# Patient Record
Sex: FEMALE | Race: WHITE | NOT HISPANIC OR LATINO | Employment: FULL TIME | URBAN - METROPOLITAN AREA
[De-identification: names, ages, dates, MRNs, and addresses within clinical notes are randomized per-mention and may not be internally consistent; named-entity substitution may affect disease eponyms.]

---

## 2018-02-13 ENCOUNTER — OFFICE VISIT - HEALTHEAST (OUTPATIENT)
Dept: INTERNAL MEDICINE | Facility: CLINIC | Age: 28
End: 2018-02-13

## 2018-02-13 DIAGNOSIS — Z00.00 ANNUAL PHYSICAL EXAM: ICD-10-CM

## 2018-02-13 DIAGNOSIS — Z12.4 SCREENING FOR CERVICAL CANCER: ICD-10-CM

## 2018-02-13 LAB
ALBUMIN SERPL-MCNC: 3.8 G/DL (ref 3.5–5)
ALP SERPL-CCNC: 52 U/L (ref 45–120)
ALT SERPL W P-5'-P-CCNC: 12 U/L (ref 0–45)
ANION GAP SERPL CALCULATED.3IONS-SCNC: 9 MMOL/L (ref 5–18)
AST SERPL W P-5'-P-CCNC: 17 U/L (ref 0–40)
BILIRUB SERPL-MCNC: 0.4 MG/DL (ref 0–1)
BUN SERPL-MCNC: 7 MG/DL (ref 8–22)
CALCIUM SERPL-MCNC: 9.5 MG/DL (ref 8.5–10.5)
CHLORIDE BLD-SCNC: 104 MMOL/L (ref 98–107)
CHOLEST SERPL-MCNC: 199 MG/DL
CO2 SERPL-SCNC: 25 MMOL/L (ref 22–31)
CREAT SERPL-MCNC: 0.8 MG/DL (ref 0.6–1.1)
FASTING STATUS PATIENT QL REPORTED: YES
GFR SERPL CREATININE-BSD FRML MDRD: >60 ML/MIN/1.73M2
GLUCOSE BLD-MCNC: 85 MG/DL (ref 70–125)
HDLC SERPL-MCNC: 45 MG/DL
LDLC SERPL CALC-MCNC: 100 MG/DL
POTASSIUM BLD-SCNC: 3.8 MMOL/L (ref 3.5–5)
PROT SERPL-MCNC: 7 G/DL (ref 6–8)
SODIUM SERPL-SCNC: 138 MMOL/L (ref 136–145)
TRIGL SERPL-MCNC: 269 MG/DL

## 2018-02-13 ASSESSMENT — MIFFLIN-ST. JEOR: SCORE: 1424.42

## 2018-02-15 LAB
BKR LAB AP ABNORMAL BLEEDING: NO
BKR LAB AP BIRTH CONTROL/HORMONES: NORMAL
BKR LAB AP CERVICAL APPEARANCE: NORMAL
BKR LAB AP GYN ADEQUACY: NORMAL
BKR LAB AP GYN INTERPRETATION: NORMAL
BKR LAB AP GYN OTHER FINDINGS: NORMAL
BKR LAB AP HPV REFLEX: NORMAL
BKR LAB AP LMP: NORMAL
BKR LAB AP PATIENT STATUS: NORMAL
BKR LAB AP PREVIOUS ABNORMAL: NORMAL
BKR LAB AP PREVIOUS NORMAL: NORMAL
HIGH RISK?: NO
PATH REPORT.COMMENTS IMP SPEC: NORMAL
RESULT FLAG (HE HISTORICAL CONVERSION): NORMAL

## 2019-04-15 ENCOUNTER — PRENATAL OFFICE VISIT (OUTPATIENT)
Dept: NURSING | Facility: CLINIC | Age: 29
End: 2019-04-15
Payer: COMMERCIAL

## 2019-04-15 VITALS
BODY MASS INDEX: 28.51 KG/M2 | WEIGHT: 167 LBS | HEIGHT: 64 IN | DIASTOLIC BLOOD PRESSURE: 70 MMHG | HEART RATE: 74 BPM | SYSTOLIC BLOOD PRESSURE: 110 MMHG

## 2019-04-15 DIAGNOSIS — Z34.01 ENCOUNTER FOR SUPERVISION OF NORMAL FIRST PREGNANCY IN FIRST TRIMESTER: Primary | ICD-10-CM

## 2019-04-15 LAB
ABO + RH BLD: NORMAL
ABO + RH BLD: NORMAL
BLD GP AB SCN SERPL QL: NORMAL
BLOOD BANK CMNT PATIENT-IMP: NORMAL
ERYTHROCYTE [DISTWIDTH] IN BLOOD BY AUTOMATED COUNT: 11.4 % (ref 10–15)
HCG, QUAL URINE: POSITIVE
HCT VFR BLD AUTO: 38.2 % (ref 35–47)
HGB BLD-MCNC: 13.4 G/DL (ref 11.7–15.7)
MCH RBC QN AUTO: 30.9 PG (ref 26.5–33)
MCHC RBC AUTO-ENTMCNC: 35.1 G/DL (ref 31.5–36.5)
MCV RBC AUTO: 88 FL (ref 78–100)
PLATELET # BLD AUTO: 253 10E9/L (ref 150–450)
RBC # BLD AUTO: 4.34 10E12/L (ref 3.8–5.2)
SPECIMEN EXP DATE BLD: NORMAL
WBC # BLD AUTO: 6.1 10E9/L (ref 4–11)

## 2019-04-15 PROCEDURE — 86901 BLOOD TYPING SEROLOGIC RH(D): CPT | Performed by: ADVANCED PRACTICE MIDWIFE

## 2019-04-15 PROCEDURE — 84703 CHORIONIC GONADOTROPIN ASSAY: CPT

## 2019-04-15 PROCEDURE — 87086 URINE CULTURE/COLONY COUNT: CPT | Performed by: ADVANCED PRACTICE MIDWIFE

## 2019-04-15 PROCEDURE — 86900 BLOOD TYPING SEROLOGIC ABO: CPT | Performed by: ADVANCED PRACTICE MIDWIFE

## 2019-04-15 PROCEDURE — 85027 COMPLETE CBC AUTOMATED: CPT | Performed by: ADVANCED PRACTICE MIDWIFE

## 2019-04-15 PROCEDURE — 36415 COLL VENOUS BLD VENIPUNCTURE: CPT | Performed by: ADVANCED PRACTICE MIDWIFE

## 2019-04-15 PROCEDURE — 86850 RBC ANTIBODY SCREEN: CPT | Performed by: ADVANCED PRACTICE MIDWIFE

## 2019-04-15 PROCEDURE — 86762 RUBELLA ANTIBODY: CPT | Performed by: ADVANCED PRACTICE MIDWIFE

## 2019-04-15 PROCEDURE — 87340 HEPATITIS B SURFACE AG IA: CPT | Performed by: ADVANCED PRACTICE MIDWIFE

## 2019-04-15 PROCEDURE — 99207 ZZC NO CHARGE NURSE ONLY: CPT

## 2019-04-15 PROCEDURE — 0064U ANTB TP TOTAL&RPR IA QUAL: CPT | Performed by: ADVANCED PRACTICE MIDWIFE

## 2019-04-15 PROCEDURE — 87491 CHLMYD TRACH DNA AMP PROBE: CPT | Performed by: ADVANCED PRACTICE MIDWIFE

## 2019-04-15 PROCEDURE — 87389 HIV-1 AG W/HIV-1&-2 AB AG IA: CPT | Performed by: ADVANCED PRACTICE MIDWIFE

## 2019-04-15 PROCEDURE — 87591 N.GONORRHOEAE DNA AMP PROB: CPT | Performed by: ADVANCED PRACTICE MIDWIFE

## 2019-04-15 RX ORDER — PRENATAL VIT/IRON FUM/FOLIC AC 27MG-0.8MG
1 TABLET ORAL DAILY
COMMUNITY
End: 2021-10-20

## 2019-04-15 SDOH — HEALTH STABILITY: MENTAL HEALTH: HOW OFTEN DO YOU HAVE A DRINK CONTAINING ALCOHOL?: NEVER

## 2019-04-15 ASSESSMENT — MIFFLIN-ST. JEOR: SCORE: 1469.57

## 2019-04-15 NOTE — PROGRESS NOTES
"Blood pressure 110/70, pulse 74, height 1.613 m (5' 3.5\"), weight 75.8 kg (167 lb), last menstrual period 02/18/2019, not currently breastfeeding.    8w0d  Estimated Date of Delivery: Nov 25, 2019    Patient supplied answers from flow sheet for:  Prenatal OB Questionnaire.  Past Medical History  Diabetes?: No  Hypertension : No  Heart disease, mitral valve prolapse or rheumatic fever?: No  An autoimmune disease such as lupus or rheumatoid arthritis?: No  Kidney disease or urinary tract infection?: (!) Yes(UTI)  Epilepsy, seizures or spells?: No  Migraine headaches?: No  A stroke or loss of function or sensation?: No  Any other neurological problems?: No  Have you ever been treated for depression?: No  Are you having problems with crying spells or loss of self-esteem?: No  Have you ever required psychiatric care?: No  Have you ever had hepatitis, liver disease or jaundice?: No  Have you been treated for blood clots in your veins, deep vein thromosis, inflammation in the veins, thrombosis, phlebitis, pulmonary embolism or varicosities?: No  Have you had excessive bleeding after surgery or dental work?: No  Do you bleed more than other women after a cut or scratch?: No  Do you have a history of anemia?: No  Have you ever had thyroid problems or taken thyroid medication?: No   Do you have any endocrine problems?: No  Have you ever been in a major accident or suffered serious trauma?: No  Within the last year, has anyone hit, slapped, kicked or otherwise hurt you?: No  In the last year, has anyone forced you to have sex when you didn't want to?: No    Past Medical History 2   Have you ever received a blood transfusion?: No  Would you refuse a blood transfusion if a doctor judged it to be medically necessary?: No  Does anyone in your home smoke?: No  Do you use tobacco products?: No  Do you drink beer, wine or hard liquor?: No  Do you use any of the following: marijuana, speed, cocaine, heroin, hallucinogens or other " drugs?: No   Is your blood type Rh negative?: Unknown  Have you ever had abnormal antibodies in your blood?: No  Have you ever had asthma?: No  Have you ever had tuberculosis?: No  Do you have any allergies to drugs or over-the-counter medications?: No  Allergies: Dust Mites, Aspartame, Ethanol, Venlafaxine, Hydrochloride, Sertraline: No  Have you had any breast problems?: No  Have you ever ?: No  Have you had any gynecological surgical procedures such as cervical conization, a LEEP procedure, laser treatment, cryosurgery of the cervix or a dilation and curettage, etc?: No  Have you ever had any other surgical procedures?: No  Have you been hospitalized for a nonsurgical reason excluding normal delivery?: No  Have you ever had any anesthetic complications?: No  Have you ever had an abnormal pap smear?: No    Past Medical History (Continued)  Do you have a history of abnormalities of the uterus?: No  Did your mother take MARIELOS or any other hormones when she was pregnant with you?: No  Did it take you more than a year to become pregnant?: No  Have you ever been evaluated or treated for infertility?: No  Is there a history of medical problems in your family, which you feel may be important to this pregnancy?: No  Do you have any other problems we have not asked about which you feel may be important to this pregnancy?: No    Symptoms since last menstrual period  Do you have any of the following symptoms: abdominal pain, blood in stools or urine, chest pain, shortness of breath, coughing or vomiting up blood, your heart racing or skipping beats, nausea and vomiting, pain on urination or vaginal discharge or bleed: (!) Yes(abdominal cramping)  Current medications, including over-the-counter medications, you are using? (If not applicable answer none): prenatals  Will the patient be 35 years old or older at the time of delivery?: No    Has the patient, baby's father or anyone in either family had:  Thalassemia  (Slovenian, English, Mediterranean or  background only) and an MCV result less than 80?: No  Neural tube defect such as meningomyelocele, spina bifida or anencephaly?: No  Congenital heart defect?: No  Down's Syndrome?: No  Demar-Sachs disease (Pentecostalism, Cajun, Wolof-Kamas)?: No  Sickle cell disease or trait ()?: No  Hemophilia or other inherited problems of blood?: No  Muscular dystrophy?: No  Cystic fibrosis?: No  Kent's chorea?: No  Mental retardation/autism?: No  Any other inherited genetic or chromosomal disorder?: No  Maternal metabolic disorder (e.g Insulin-dependent diabetes, PKU)?: No  A child with birth defects not listed above?: No  Recurrent pregnancy loss or stillbirth?: No   Has the patient had any medications/street drugs/alcohol since her last menstrual period?: (!) Yes(alcohol before she knew she was pregnant)  Does the patient or baby's father have any other genetic risks?: No    Infection History   Do you object to being tested for Hepatitis B?: No  Do you object to being tested for HIV?: No   Do you feel that you are at high risk for coming in contact with the AIDS virus?: No  Have you ever been treated for tuberculosis?: No  Have you ever had a positive skin test for tuberculosis?: No  Do you live with someone who has tuberculosis?: No  Have you ever been exposed to tuberculosis?: No  Do you have genital herpes?: (!) Yes  Does your partner have genital herpes?: Unknown  Have you had a viral illness since your last period?: No  Have you ever had gonorrhea, chlamydia, syphilis, venereal warts, trichomoniasis, pelvic inflammatory disease or any other sexually transmitted disease?: No  Do you know if you are a genital group B streptococcus carrier?: No  Have you had chicken pox/varicella?: (!) Yes   Have you been vaccinated against chicken Pox?: (!) Yes  Have you had any other infectious diseases?: No      Patient is accompanied by . Prenatal book and folder (containing standard  educational hand-outs and brochures) given to patient. Information in folder reviewed. Questions answered.     Discussed and gave written information on options available for 1st and 2nd trimester screening, CVS, amniocentesis, AFP, and QUAD screen plus optimal time-frame for testing. Brochure given on optional screening available to determine Cystic Fibrosis carrier status. Pt instructed to check with insurance regarding coverage of these optional tests. Pt advised to call back if she desires testing or has any questions or concerns.    Prenatal labs obtained. New prenatal visit scheduled on 4/30 with Ruba Hoyos.        Donna Brooks RN

## 2019-04-16 LAB
BACTERIA SPEC CULT: NORMAL
C TRACH DNA SPEC QL NAA+PROBE: NEGATIVE
HBV SURFACE AG SERPL QL IA: NONREACTIVE
HIV 1+2 AB+HIV1 P24 AG SERPL QL IA: NONREACTIVE
N GONORRHOEA DNA SPEC QL NAA+PROBE: NEGATIVE
RUBV IGG SERPL IA-ACNC: 22 IU/ML
SPECIMEN SOURCE: NORMAL
T PALLIDUM AB SER QL: NONREACTIVE

## 2019-04-29 DIAGNOSIS — Z34.01 ENCOUNTER FOR SUPERVISION OF NORMAL FIRST PREGNANCY IN FIRST TRIMESTER: ICD-10-CM

## 2019-04-30 ENCOUNTER — PRENATAL OFFICE VISIT (OUTPATIENT)
Dept: OBGYN | Facility: CLINIC | Age: 29
End: 2019-04-30
Payer: COMMERCIAL

## 2019-04-30 VITALS — WEIGHT: 171.4 LBS | SYSTOLIC BLOOD PRESSURE: 108 MMHG | BODY MASS INDEX: 29.89 KG/M2 | DIASTOLIC BLOOD PRESSURE: 74 MMHG

## 2019-04-30 DIAGNOSIS — Z34.01 ENCOUNTER FOR SUPERVISION OF NORMAL FIRST PREGNANCY IN FIRST TRIMESTER: Primary | ICD-10-CM

## 2019-04-30 PROCEDURE — 99207 ZZC FIRST OB VISIT: CPT | Performed by: ADVANCED PRACTICE MIDWIFE

## 2019-04-30 NOTE — PROGRESS NOTES
Aliya Yip is a 28 year old  ,  who is not a previous CNM patient. She presents for a new OB Visit. This was not a planned pregnancy, but it is desired.     FOB is Pete who is in good health.  CONSUELO IS actively involved in relationship and this pregnancy.      She has not had bleeding since her LMP.    She reports mild abdominal cramps since her LMP.  She has not had nausea.  has not had vomiting.  Any personal or family history of blood clots? No  History of sickle cell anemia or trait? No         Patient's last menstrual period was 2019 (exact date)..  Estimated Date of Delivery: 2019 Ultrasound consistent with LMP.    MENSTRUAL HISTORY    frequency: every 28-30 days  Last PAP:   History of abnormal Pap?  No    Health maintenance updated:  yes        Current medications are:    Current Outpatient Medications:      Prenatal Vit-Fe Fumarate-FA (PRENATAL MULTIVITAMIN W/IRON) 27-0.8 MG tablet, Take 1 tablet by mouth daily, Disp: , Rfl:      norgestim-eth estrad triphasic (TRI-SPRINTEC) 0.18/0.215/0.25 MG-35 MCG TABS tablet, Take 1 tablet by mouth daily., Disp: , Rfl:      VALACYCLOVIR HCL PO, Take 500 mg by mouth 2 times daily. x3 days, started 13 , Disp: , Rfl:      INFECTION HISTORY  HIV: No  Hepatitis B: No  Hepatitis C: No  Tuberculosis: No   Genital Herpes self: yes  Herpes partner:  no  Chlamydia:  no  Gonorrhea:  no  HPV: No  BV:  No  Syphilis:  No  Chicken Pox:  Yes - had infection in childhood      OB HISTORY  OB History    Para Term  AB Living   1 0 0 0 0 0   SAB TAB Ectopic Multiple Live Births   0 0 0 0 0      # Outcome Date GA Lbr Beto/2nd Weight Sex Delivery Anes PTL Lv   1 Current                PERSONAL HISTORY  Exercise Habits:  none   Employment: Full time.  Her job involves sedentary activity with no potential for toxic exposure.    Travel plans:  are none planned.   Diet: eats regular meals, has adequate calcium intake and takes daily  vitamins  Prenatal vitamins? Yes: DHA  Fish Oil? Yes  Abuse concerns? No  Any history if abuse, varbal, physical, sexual? No  Hgb A1c screen:  BMI > 30: No, 1st degree family DM: Yes, History of GDM: No, PCOS: No, High risk ethnicity: No    Social History     Socioeconomic History     Marital status:      Spouse name: Not on file     Number of children: Not on file     Years of education: Not on file     Highest education level: Not on file   Occupational History     Occupation: lock desk analyst   Social Needs     Financial resource strain: Not on file     Food insecurity:     Worry: Not on file     Inability: Not on file     Transportation needs:     Medical: Not on file     Non-medical: Not on file   Tobacco Use     Smoking status: Never Smoker     Smokeless tobacco: Never Used   Substance and Sexual Activity     Alcohol use: Not Currently     Frequency: Never     Drug use: Never     Sexual activity: Yes     Partners: Male   Lifestyle     Physical activity:     Days per week: Not on file     Minutes per session: Not on file     Stress: Not on file   Relationships     Social connections:     Talks on phone: Not on file     Gets together: Not on file     Attends Gnosticist service: Not on file     Active member of club or organization: Not on file     Attends meetings of clubs or organizations: Not on file     Relationship status: Not on file     Intimate partner violence:     Fear of current or ex partner: Not on file     Emotionally abused: Not on file     Physically abused: Not on file     Forced sexual activity: Not on file   Other Topics Concern     Not on file   Social History Narrative     Not on file       She  reports that she has never smoked. She has never used smokeless tobacco.    STD testing offered?  Declined, done at nurse only appointment    PHQ-2 Score:     PHQ-2 ( 1999 Pf[employer]) 4/30/2019   Q1: Little interest or pleasure in doing things 1   Q2: Feeling down, depressed or hopeless 0   PHQ-2  Score 1     Alcohol Score = denies any ETOH or recreational drug use in pregnancy  Referral/Meds needed? no    PAST MEDICAL/SURGICAL HISTORY  Past Medical History:   Diagnosis Date     Genital herpes      UTI (urinary tract infection)      No past surgical history on file.    FAMILY HISTORY  Family History   Problem Relation Age of Onset     Diabetes Mother      Breast Cancer Mother      Hypertension Mother        ROS:  CONSTITUTIONAL: NEGATIVE for fever, chills, change in weight  INTEGUMENTARU/SKIN: NEGATIVE for worrisome rashes, moles or lesions  EYES: NEGATIVE for vision changes or irritation  ENT: NEGATIVE for ear, mouth and throat problems  RESP: NEGATIVE for significant cough or SOB  BREAST: NEGATIVE for masses, tenderness or discharge  CV: NEGATIVE for chest pain, palpitations or peripheral edema  GI: NEGATIVE for nausea, abdominal pain, heartburn, or change in bowel habits  : NEGATIVE for unusual urinary or vaginal symptoms.   MUSCULOSKELETAL: NEGATIVE for significant arthralgias or myalgia  NEURO: NEGATIVE for weakness, dizziness or paresthesias; POSITIVE for occasional HA, reports 3-4 HA during pregnancy  PSYCHIATRIC: NEGATIVE for changes in mood or affect    PHYSICAL EXAM  Vitals: /74   Wt 77.7 kg (171 lb 6.4 oz)   LMP 02/18/2019 (Exact Date)   BMI 29.89 kg/m    BMI= Body mass index is 29.89 kg/m .     GENERAL:  28 year old pleasant pregnant female, alert, cooperative and well groomed.  NECK:  Thyroid without enlargement and nodules.  Lymph nodes not palpable.   LUNGS:  Clear to auscultation.  BREAST:  Symmetrical without lesions or nodes.  Nipples everted.  Areolas symmetric.  No palpable axillary nodes.  HEART:  RRR without murmur.  ABDOMEN: Soft without masses or tenderness.  No scars noted..  GENITALIA: BUS without tenderness or inflammation.  Perineum without lesions.    VAGINA:  Pink, normal rugae and discharge.  CERVIX:  Posterior, smooth, without discharge, and firm/ closed 4 cm long.    UTERUS: Retroverted, nontender 10 weeks in size.  ADNEXA: Without masses or tenderness  RECTAL:  Normal appearance.  Digital exam deferred.  LOWER EXTREMITIES: No edema. No significant varicosities.    ASSESSMENT/PLAN:    IUP at 10w1d; pregnancy progressing as expected.      ICD-10-CM    1. Encounter for supervision of normal first pregnancy in first trimester Z34.01       consult for US for AMA patients: NA  Genetic Testing reviewed and discussed, patient desires to discuss with spouse and will let provider know. Handout provided      COUNSELING    Instructed on use of triage nurse line and contacting the on call CNM after hours in an emergency.     Symptoms of N&V and fatigue usually start to resolve around 12-16 weeks     Reviewed CNM philosophy, call schedule for labor and delivery, and FR for delivery    1st OB handout given outlining appointment spacing and CNM information    Reviewed exercise and nutrition    Recommend to gain 15 pounds with her pregnancy.    Discussed OTC medications. OB med list given    Encouraged patient to arrange  if needed    Encouraged patient to take PNV's/DHA    Travel precautions discussed, no air travel after 36 weeks and Zika Virus discussed    Will call patient with lab results when available    Does patient desire a RN home visit from the [facility]?  No      F/U to be addressed next visit: none    Advised patient to contact provider if HA become regular or symptoms worsen. Will return to the clinic in 4 weeks for her next routine prenatal check.  Will call to be seen sooner if problems arise.    SHRUTHI Bronson, CNM

## 2019-04-30 NOTE — NURSING NOTE
"Chief Complaint   Patient presents with     Prenatal Care     NPN 10w 1d, no concerns       Initial /74   Wt 77.7 kg (171 lb 6.4 oz)   LMP 2019 (Exact Date)   BMI 29.89 kg/m   Estimated body mass index is 29.89 kg/m  as calculated from the following:    Height as of 4/15/19: 1.613 m (5' 3.5\").    Weight as of this encounter: 77.7 kg (171 lb 6.4 oz).  BP completed using cuff size: regular    Questioned patient about current smoking habits.  Pt. has never smoked.          The following HM Due: NONE  Gaby Story CMA           "

## 2019-04-30 NOTE — PATIENT INSTRUCTIONS
Weeks 9 thru 13 - Gestational Age (Fetal Development - Weeks 7 thru 11)  At 10 weeks, the embryo is at the end of the embryonic period and begins the fetal period. The fetus has grown to about 3 inches in length and weighs about an ounce. The genitalia have clearly formed into male or female, but still can not be seen clearly on an ultrasound. The eyelids close and will not reopen until the around 28 weeks of gestation. The fetus can make a fist, and the buds for baby teeth appear. The head is nearly half the size of the entire fetus.    Thank you for coming to see the Midwives at the   [facility]      We will notify you about your labs that were drawn today once we get the results back or if you have [employer] they will be posted there as well      We will call you personally with results that require further discussion      If any referrals were ordered today you should be getting a call in the next week or you may need to call the number listed with your referral to schedule.            If you need any refills of medications please call your pharmacy and they will contact us      If you have any questions or concerns before your next visit, you can reach the nurse midwife on call by calling our pager number [phone].      If you  wish to schedule another appointment, please call our office at [phone]. You can also make appointments through [employer]        If you have a medical emergency please call [phone].    Because you are pregnant, we have additional resources for you:      You may call our consulting RN's during normal business hours for non-urgent questions about your pregnancy.      After hours you may also page the midwife on call for urgent questions or issues at [phone].  There is always a midwife on call 24 hours a day.    Prenatal Reminders:    Before 14 weeks: Dating ultrasound, Genetic testing       This ultrasound helps us determine your dates accurately. Verifi can be drawn anytime  after 10 weeks of gestation  16 weeks: Optional genetic testing (quad screen) or single AFP       This testing helps understand your baby's risk for some genetic abnormalities.  20 weeks:  Screening ultrasound (fetal survey)       This testing will look for early growth abnormalities, and may tell the baby's sex if you wish to find out.  28 weeks: One hour sugar test (GCT), hemoglobin and platelets       This test helps identify diabetes of pregnancy or gestational diabetes.  We also look      at the iron in your blood and how well your blood clots.  28 - 36 weeks: Tetanus shot (Tdap)       This shot helps protect you and your baby from tetanus and whooping cough.  36 weeks and later: Group B Strep test (GBS)       This test helps predict if you need antibiotics in labor to prevent infection in your baby.  Anytime September to April:  Flu shot       This shot helps protect you and your family from the flu.  This is especially important during pregnancy        Any time during or after your pregnancy you may experience increased depression and/or mood changes.    We are here to support you. Please contact us if you are:    Feeling anxious    Overwhelmed or sad     Trouble sleeping    Crying uncontrollably    Trouble caring for yourself or baby.    The typical schedule after your first visit today you can expect:     Visit 2 - 12-16 weeks  Visit 3 - 20 weeks  Visit 4 - 24 weeks  Visit 5 - 28 weeks  Visit 6 - 32 weeks  Visit 7 - 34 weeks  Visit 8 - 36 weeks  Weekly after 36 weeks until delivery.    If anything comes up between your visits or you have concerns please don't hesitate to contact us.    Secure access to your medical record:  Use [employer] (secure email communication and access to your chart) to send your primary care provider a message or make an appointment. Ask someone on your Team how to sign up for [employer]. To log on to [employer] or for more information in [employer] please visit the website at  [email or web address].       Certified Nurse Midwife (CNM) Team  SHRUTHI Bronson, SHRUTHI Gutierrez, SHRUTHI Brown, SHRUTHI Mendez, SHRUTHI Quiroga, EUFEMIA    Again, thank you for choosing the midwives at [facility].  We are excited to be a part of your pregnancy. Please let us know how we can best partner with you to improve your and your family's health.    Genetic Screening in Pregnancy    There are several options you have for genetic screening in your pregnancy.  Everyone has their own personal reasons to screen or not to screen.  We want you to make the best choice for you and your pregnancy.  Below is a list of options, what they screen for and when the screening is done.  Genetic screening is recommended for women who are 35 and older at delivery and for those with a family history of chromosomal abnormalities. However, screening is offered to all women.    Innatal:      This is a screening for the more common chromosome abnormalities, including trisomy 21 (Down's syndrome), trisomy 18, trisomy 13 and sex chromosome abnormalities. This is a prenatal test that can be done as early as 10 weeks gestation.       A maternal blood sample is drawn that sequences cell-free DNA in maternal plasma. This in turn allows for molecular counting of chromosome copy numbers with a >99% detection rate of Down syndrome, a 97% detection rate of Trisomy 18, and a 78% detection rate of Trisomy 13.    This test will give information about the gender of the baby.  You can choose to not have that disclosed.       Results come back within 10-14 days.     About 5% of samples will have results that are designated as \"indeterminate\" or \"uninterruptable.\" With this type of result, genetic counseling and diagnostic testing are recommended. Remember this is a screening test and does not definitively diagnose or exclude the presence of these chromosome conditions.     This screening may or may " not be covered by insurance.  We recommend you consult your insurance company to discuss.  Financial assistance is available at a low cost if not covered by your insurance.         Hemoglobin Electrophoresis:  Hemoglobin electrophoresis is a non-invasive blood test that looks at abnormal hemoglobin (component of red blood cells) production. Examples of this include sickle cell anemia (which is a disorder of the red blood cells and their shape) and the thalassemia s (which is also a form of anemia).  High risk groups include:  , Southeast Asians, , Mediterranean, Polish, South and Central American and [place] descent. This blood test is usually done with your first OB labs.  This is a one-time test.      Trio Carrier Screen:   1.  Cystic Fibrosis (CF) is the most common life-shortening autosomal  recessive disease among  populations, with a frequency of 1 in  Every 3,500 live births. Although it mainly affects the   Population anyone can request screening. If you have a family history of  cystic fibrosis you should request this test.  This screening is a blood draw      2.  Spinal Muscular Atrophy (SMA) is the most common inherited cause  of infant death.  The most common form of this disorder causes death by a age two.  One in every 6,000 to 10,000 babies born in the  has SMA      3.  Fragile X Syndrome (FXS) is the most common inherited cause of  intellectual disability.  Approximately 1 in every 3,600 boys and 1 in every  6,000 girls is born with FXS      **If you are a carrier of CF or SMA, your partner will also need to be tested. This partner testing is offered free of charge.  This screen can be done prior to pregnancy or at any time during the pregnancy.      Quad Screen:    The quad screen is a quadruple marker screening test done during the second trimester of your pregnancy. This prenatal screening test that measures levels of four substances in a pregnant  woman's blood; Alpha-fetoprotein (AFP), Human chorionic gonadotropin (HCG), Estriol, and Inhibin A.     AFP screens for open neural tube defects like Spina Bifida and Anencephaly.     Spina bifida is a serious birth defect that occurs when a portion of the neural tube fails to develop or close properly, causing defects in the spinal cord and in the bones of the spine.     Anencephaly is a serious birth defect in the closure of the neural tube, resulting in an underdeveloped brain and an incomplete skull.     Human chorionic gonadotropin (HCG), Estriol, and Inhibin screen for Down syndrome (trisomy 21) and Trisomy 18.     Down syndrome is a chromosomal disorder that causes lifelong intellectual disability and developmental delays and, in some people, health problems    Trisomy 18 is a chromosomal disorder that causes severe developmental delays and anatomic abnormalities. It is often fatal by age 1.    The screening is ideally done between 15 and 18 weeks of pregnancy but can be done up to week 20. Even if you have done the Verifi testing you can still get a single AFP drawn to check for neural tube defects.       Screening Ultrasound:    This is a fetal survey done around 20 weeks gestation.  This screen will look at the cardiac activity, fetal heart rate and rhythm, assessment of the amniotic fluid volume, placenta appearance and location, and the umbilical cord vessel number and placental insertion site.  They also do many fetal measurements to make sure the baby is growing properly.  The cervical length is also measured and fetal movement is evaluated.  The sex of the baby can usually be determined.      In the event of a positive screen:    There are two diagnostic tests available if any screening tests come back with an increased risk.  You would first be referred to Maternal Fetal Medicine to be seen by a genetic counselor.  They would assess your risk and see if further diagnostic testing is warranted.  The  options are; chorionic villus sampling (CVS), usually done at 11 to 12 weeks of pregnancy and amniocentesis which is generally done later in pregnancy around 15 to 20 weeks.  All risks/benefits would be explained and you can decide what course of action is best for you and your family.    Why you might choose to screen?    A desire to know as much as you can about your baby    If your baby had a genetic abnormality you can learn about it before they are born    Choose whether to continue the pregnancy or to terminate    Why you might choose to NOT screen?    You feel that whatever happens is fine and you would not terminate    You know you don't want to do any diagnostic tests even if the screening test showed a high possibility of a genetic abnormality        For further information please call:  [facility]  [phone]      Fish Safety During Pregnancy    Often time's women worry about eating fish during pregnancy. Fish can be totally safe to eat during pregnancy.However, some fish may contain contaminants that could harm you or your baby if you eat certain types of fish or eat fish too often. Below is a list of which types of fish to eat, how often to eat fish, and which types of fish to avoid while pregnant.    Reasons to eat fish:    Fish is a great source of protein, vitamins, and minerals.    The oils found in fish are important to unborn and breast fed babies    Eating fish may play a role in the prevention of heart disease in adults       Fish to EAT while pregnant   2 servings per week of any of these types of fish    Catfish (farm raised)  Cod    Crab    Gonzales    Flatfish   Oysters    [place]   [place]     Sardines   Scallops    Shrimp   Tilapia   1 serving per week of any of these fish    Canned LIGHT tuna     BENJA King   [place]    Yellow Perch   1 serving per MONTH of these types of fish    Canned WHITE tuna  [facility]  Rodriguez    Grouper   Miguel A Vital    [place] Roughy    Tuna steak   [place] caught-    Bass    Catfish    Walleye smaller than 20 inches    Northern Driver smaller than 30 inches         Servings of fish should be based on your weight, 1 oz for every 20 lbs of body weight. For example: 130 lb person can safely eat 7 oz     150 lb person can safely eat 8 oz     170 lb person can safely eat 9 oz      Make sure to space out meals with fish throughout the month, don't eat all your fish meals for the month within a few days.       Fish to Avoid while Pregnant:      Shark   Johan Mackerel    Swordfish  Raw Sushi-any type of fish    Tile fish         [place] Caught:      Walleye longer than 20 in    Northern Driver longer than 30 in    Musky      Contaminants:      Mercury comes from air pollution and small amounts of mercury can damage the brain that is just starting to form or grow. Too much mercury may affect a child's behavior and lead to learning problems later in life. Too much mercury in adults and older children may cause tingling, numbness in hands and feet, or vision changes    PCBs a man-made substance once used in electrical transformers but was banned in 1967 although it can still be found in the [place]. A baby who are exposed to PCBs during pregnancy may have a lower birth weight, reduced head size, and delayed physical development. Exposure to PCBs may also cause cancer    PFOS is a man-made chemical to make products that resist heat, oil, stains, and grease. Studies in lab animals exposed to low levels of PFOS showed decreas HDL (good cholesterol) and changes in thyroid hormone levels. The concern about PFOS is with long-term exposure such as consuming large amounts over a long period of time     Mercury and PFOS cannot be removed through cooking or cleaning. By removing fat when cleaning and cooking you can reduce PCBs.      For more information and up to date information about fish safety  in [place] please contact the [place] Department of Health (MD) or the Department of Natural Resources (DNR):    [email or web address].  DNR: [phone]  MD: [phone]

## 2019-05-03 ENCOUNTER — HEALTH MAINTENANCE LETTER (OUTPATIENT)
Age: 29
End: 2019-05-03

## 2019-05-28 ENCOUNTER — PRENATAL OFFICE VISIT (OUTPATIENT)
Dept: OBGYN | Facility: CLINIC | Age: 29
End: 2019-05-28
Payer: COMMERCIAL

## 2019-05-28 VITALS — WEIGHT: 165 LBS | DIASTOLIC BLOOD PRESSURE: 72 MMHG | SYSTOLIC BLOOD PRESSURE: 112 MMHG | BODY MASS INDEX: 28.77 KG/M2

## 2019-05-28 DIAGNOSIS — Z34.02 ENCOUNTER FOR SUPERVISION OF NORMAL FIRST PREGNANCY IN SECOND TRIMESTER: Primary | ICD-10-CM

## 2019-05-28 PROBLEM — Z34.90 SUPERVISION OF NORMAL PREGNANCY: Status: ACTIVE | Noted: 2019-05-28

## 2019-05-28 PROCEDURE — 99207 ZZC PRENATAL VISIT: CPT | Performed by: ADVANCED PRACTICE MIDWIFE

## 2019-05-28 NOTE — PROGRESS NOTES
S: Patient feels well and has no concerns.  Patient has not had nausea and has not had vomiting  O: /72 (BP Location: Left arm, Cuff Size: Adult Regular)   Wt 74.8 kg (165 lb)   LMP 02/18/2019 (Exact Date)   BMI 28.77 kg/m         Exam:  Constitutional: healthy, alert and no distress  Respiratory: Respirations even and unlabored  Gastrointestinal: Abdomen soft, non-tender. Fundus measures appropriately for gestational age. Fetal heart tones heard easily  Psychiatric: mentation appears normal and affect normal/bright  A:    Diagnosis Comments   1. Encounter for supervision of normal first pregnancy in second trimester       P:  Educated about diet and exercise and normal weight gain  Normal to feel movement between 18-22 weeks  Reviewed labs from 1st OB  Discussed genetic screening; patient and partner decline genetic screening  Warning signs discussed  Discussed option for Quad screen at next visit.   Return to clinic 4 weeks  Encouraged patient to call with any questions or concerns.    SHRUTHI Bronson, CNM

## 2019-05-28 NOTE — NURSING NOTE
"Chief Complaint   Patient presents with     Prenatal Care     14w 1d, no concerns       Initial /72 (BP Location: Left arm, Cuff Size: Adult Regular)   Wt 74.8 kg (165 lb)   LMP 2019 (Exact Date)   BMI 28.77 kg/m   Estimated body mass index is 28.77 kg/m  as calculated from the following:    Height as of 4/15/19: 1.613 m (5' 3.5\").    Weight as of this encounter: 74.8 kg (165 lb).  BP completed using cuff size: regular    Questioned patient about current smoking habits.  Pt. has never smoked.          The following HM Due: NONE  Gaby Story CMA           "

## 2019-05-28 NOTE — PATIENT INSTRUCTIONS
Weeks 14 thru 16 - Gestational Age (Fetal age - Weeks 12 thru 14):  The fetus s skin is thin and see-through. Fine hair called lanugo begins to form on the head. The fetus begins sucking and swallows bits of amniotic fluid. Fingerprints which individualize each human being have now begun to develop on the tiny fingers of the fetus. Meconium is made in the intestinal tract and will build up to be the baby s first bowel movement. Flutters may be felt in the mom s growing abdomen, as the fetus begins to move around more. Sweat glands have developed, and the liver and pancreas produce fluid secretions. The fetus has reached 6 inches in length and weighs about 4 ounces

## 2019-06-24 ENCOUNTER — PRENATAL OFFICE VISIT (OUTPATIENT)
Dept: OBGYN | Facility: CLINIC | Age: 29
End: 2019-06-24
Payer: COMMERCIAL

## 2019-06-24 VITALS — DIASTOLIC BLOOD PRESSURE: 72 MMHG | SYSTOLIC BLOOD PRESSURE: 118 MMHG | WEIGHT: 171 LBS | BODY MASS INDEX: 29.82 KG/M2

## 2019-06-24 DIAGNOSIS — Z34.02 ENCOUNTER FOR SUPERVISION OF NORMAL FIRST PREGNANCY IN SECOND TRIMESTER: Primary | ICD-10-CM

## 2019-06-24 PROCEDURE — 99207 ZZC PRENATAL VISIT: CPT | Performed by: ADVANCED PRACTICE MIDWIFE

## 2019-06-24 NOTE — NURSING NOTE
"Chief Complaint   Patient presents with     Prenatal Care     18w, no concerns       Initial /72   Wt 77.6 kg (171 lb)   LMP 2019 (Exact Date)   BMI 29.82 kg/m   Estimated body mass index is 29.82 kg/m  as calculated from the following:    Height as of 4/15/19: 1.613 m (5' 3.5\").    Weight as of this encounter: 77.6 kg (171 lb).  BP completed using cuff size: regular    Questioned patient about current smoking habits.  Pt. has never smoked.          The following HM Due: NONE  Gaby Story CMA             "

## 2019-06-24 NOTE — PROGRESS NOTES
S:Feels well and has no concerns.  Fetal movement No  Denies loss of fluid/vb/contractions/pelvic pain  Depression screening done  O:  /72   Wt 77.6 kg (171 lb)   LMP 02/18/2019 (Exact Date)   BMI 29.82 kg/m    Exam:  Constitutional: healthy, alert and no distress  Respiratory: Respirations even and unlabored  Gastrointestinal: Abdomen soft, non-tender. Fundus measures appropriately for gestational age. Fetal heart tones heard easily.  Psychiatric: mentation appears normal and affect normal/bright  A:    Diagnosis Comments   1. Encounter for supervision of normal first pregnancy in second trimester  US OB > 14 Weeks      P: Discussed options for quad screen today  declined quad screen today  Anatomy ultrasound next visit between 20-22 weeks  Return to clinic 4 weeks    SHRUTHI Bronson, EUFEMIA

## 2019-06-24 NOTE — PATIENT INSTRUCTIONS
"Weeks 17 thru 20 - Gestational Age (Fetal Age - Weeks 15 thru 18):  The baby has reached a point where movements are being felt more often by the mother. The eyebrows and eyelashes grow in, and tiny nails have begun to grow on the fingers and toes. The skin of the fetus is going through many changes and begins to produce vernix at the twentieth week. Vernix is a white pasty substance that covers the fetus  skin to protect it from amniotic fluid. Your baby can now hear your voices and music.  A fetal heartbeat can be heard by a stethoscope now. The fetus has reached a length of 8 inches and weighs about 12 ounces.  Genetic Screening in Pregnancy    There are several options you have for genetic screening in your pregnancy.  Everyone has their own personal reasons to screen or not to screen.  We want you to make the best choice for you and your pregnancy.  Below is a list of options, what they screen for and when the screening is done.  Genetic screening is recommended for women who are 35 and older at delivery and for those with a family history of chromosomal abnormalities. However, screening is offered to all women.    Innatal:      This is a screening for the more common chromosome abnormalities, including trisomy 21 (Down's syndrome), trisomy 18, trisomy 13 and sex chromosome abnormalities. This is a prenatal test that can be done as early as 10 weeks gestation.       A maternal blood sample is drawn that sequences cell-free DNA in maternal plasma. This in turn allows for molecular counting of chromosome copy numbers with a >99% detection rate of Down syndrome, a 97% detection rate of Trisomy 18, and a 78% detection rate of Trisomy 13.    This test will give information about the gender of the baby.  You can choose to not have that disclosed.       Results come back within 10-14 days.     About 5% of samples will have results that are designated as \"indeterminate\" or \"uninterruptable.\" With this type of result, " genetic counseling and diagnostic testing are recommended. Remember this is a screening test and does not definitively diagnose or exclude the presence of these chromosome conditions.     This screening may or may not be covered by insurance.  We recommend you consult your insurance company to discuss.  Financial assistance is available at a low cost if not covered by your insurance.         Hemoglobin Electrophoresis:  Hemoglobin electrophoresis is a non-invasive blood test that looks at abnormal hemoglobin (component of red blood cells) production. Examples of this include sickle cell anemia (which is a disorder of the red blood cells and their shape) and the thalassemia s (which is also a form of anemia).  High risk groups include:  , Southeast Asians, , Mediterranean, Tristanian, South and Central American and [place] descent. This blood test is usually done with your first OB labs.  This is a one-time test.      Trio Carrier Screen:   1.  Cystic Fibrosis (CF) is the most common life-shortening autosomal  recessive disease among  populations, with a frequency of 1 in  Every 3,500 live births. Although it mainly affects the   Population anyone can request screening. If you have a family history of  cystic fibrosis you should request this test.  This screening is a blood draw      2.  Spinal Muscular Atrophy (SMA) is the most common inherited cause  of infant death.  The most common form of this disorder causes death by a age two.  One in every 6,000 to 10,000 babies born in the  has SMA      3.  Fragile X Syndrome (FXS) is the most common inherited cause of  intellectual disability.  Approximately 1 in every 3,600 boys and 1 in every  6,000 girls is born with FXS      **If you are a carrier of CF or SMA, your partner will also need to be tested. This partner testing is offered free of charge.  This screen can be done prior to pregnancy or at any time during the  pregnancy.      Quad Screen:    The quad screen is a quadruple marker screening test done during the second trimester of your pregnancy. This prenatal screening test that measures levels of four substances in a pregnant woman's blood; Alpha-fetoprotein (AFP), Human chorionic gonadotropin (HCG), Estriol, and Inhibin A.     AFP screens for open neural tube defects like Spina Bifida and Anencephaly.     Spina bifida is a serious birth defect that occurs when a portion of the neural tube fails to develop or close properly, causing defects in the spinal cord and in the bones of the spine.     Anencephaly is a serious birth defect in the closure of the neural tube, resulting in an underdeveloped brain and an incomplete skull.     Human chorionic gonadotropin (HCG), Estriol, and Inhibin screen for Down syndrome (trisomy 21) and Trisomy 18.     Down syndrome is a chromosomal disorder that causes lifelong intellectual disability and developmental delays and, in some people, health problems    Trisomy 18 is a chromosomal disorder that causes severe developmental delays and anatomic abnormalities. It is often fatal by age 1.    The screening is ideally done between 15 and 18 weeks of pregnancy but can be done up to week 20. Even if you have done the Verifi testing you can still get a single AFP drawn to check for neural tube defects.       Screening Ultrasound:    This is a fetal survey done around 20 weeks gestation.  This screen will look at the cardiac activity, fetal heart rate and rhythm, assessment of the amniotic fluid volume, placenta appearance and location, and the umbilical cord vessel number and placental insertion site.  They also do many fetal measurements to make sure the baby is growing properly.  The cervical length is also measured and fetal movement is evaluated.  The sex of the baby can usually be determined.      In the event of a positive screen:    There are two diagnostic tests available if any  screening tests come back with an increased risk.  You would first be referred to Maternal Fetal Medicine to be seen by a genetic counselor.  They would assess your risk and see if further diagnostic testing is warranted.  The options are; chorionic villus sampling (CVS), usually done at 11 to 12 weeks of pregnancy and amniocentesis which is generally done later in pregnancy around 15 to 20 weeks.  All risks/benefits would be explained and you can decide what course of action is best for you and your family.    Why you might choose to screen?    A desire to know as much as you can about your baby    If your baby had a genetic abnormality you can learn about it before they are born    Choose whether to continue the pregnancy or to terminate    Why you might choose to NOT screen?    You feel that whatever happens is fine and you would not terminate    You know you don't want to do any diagnostic tests even if the screening test showed a high possibility of a genetic abnormality        For further information please call:  Konrad Ochoa  [phone]

## 2019-07-08 ENCOUNTER — TELEPHONE (OUTPATIENT)
Dept: OBGYN | Facility: CLINIC | Age: 29
End: 2019-07-08

## 2019-07-18 ENCOUNTER — ANCILLARY PROCEDURE (OUTPATIENT)
Dept: ULTRASOUND IMAGING | Facility: CLINIC | Age: 29
End: 2019-07-18
Attending: ADVANCED PRACTICE MIDWIFE
Payer: COMMERCIAL

## 2019-07-18 DIAGNOSIS — Z34.02 ENCOUNTER FOR SUPERVISION OF NORMAL FIRST PREGNANCY IN SECOND TRIMESTER: ICD-10-CM

## 2019-07-18 PROCEDURE — 76805 OB US >/= 14 WKS SNGL FETUS: CPT | Performed by: FAMILY MEDICINE

## 2019-07-29 ENCOUNTER — PRENATAL OFFICE VISIT (OUTPATIENT)
Dept: OBGYN | Facility: CLINIC | Age: 29
End: 2019-07-29
Payer: COMMERCIAL

## 2019-07-29 VITALS — BODY MASS INDEX: 31.04 KG/M2 | SYSTOLIC BLOOD PRESSURE: 120 MMHG | DIASTOLIC BLOOD PRESSURE: 78 MMHG | WEIGHT: 178 LBS

## 2019-07-29 DIAGNOSIS — O28.3 ABNORMAL ULTRASONIC FINDING ON ANTENATAL SCREENING OF MOTHER: Primary | ICD-10-CM

## 2019-07-29 DIAGNOSIS — Z34.02 ENCOUNTER FOR SUPERVISION OF NORMAL FIRST PREGNANCY IN SECOND TRIMESTER: ICD-10-CM

## 2019-07-29 PROCEDURE — 99207 ZZC PRENATAL VISIT: CPT | Performed by: ADVANCED PRACTICE MIDWIFE

## 2019-07-29 NOTE — PATIENT INSTRUCTIONS
GESTATIONAL DIABETES SCREENING    All pregnant women are screened at least once for diabetes as part of their prenatal care. A woman has gestational diabetes if she has high blood sugars for the first time during pregnancy.      Diabetes can harm your health and the health of your baby.  But if we find the diabetes early in pregnancy we will watch your health closely and prevent further problems.       We will check for gestational diabetes during 28 week visit. Please note you can not do this prior to 24 weeks of gestation.      Plan to spend about an hour at the clinic.  When you check in let us know that you will be having your diabetes screening that day.       We will give you a 50 gram glucose drink that you have 5 minutes to consume.  Exactly one hour later you will have draw blood from your arm to check your blood sugar level.      We will call you to let you know if your results are normal.  If the results are normal no more testing will be needed.  If your results are not normal we will discuss follow up testing with you.        You may eat prior to the testing but it is not recommended to eat or drink very sweet things such as pop, juice, candy or dessert type foods.  Eat a high protein, low carb meals prior to testing.    If you have any questions please call:    [facility]   [phone]    Weeks 24 thru 26 - Gestational Age (Fetal Age - Weeks 22 thru 24)- Beginning the third trimester:  If your baby was delivered now, it could possibly survive outside of your body, with the assistance of medical technology. The fetus has developed patterns of  sleeping and waking and mom will begin to notice when each of these takes place. The fetus has a startle reflex, and the air sacs in the lungs have begun formation. The brain will be developing rapidly over the next few weeks. The nervous system has developed enough to control some functions. The fetus has reached about 14 inches in length and weighs about 2    pounds.

## 2019-07-30 ENCOUNTER — TRANSCRIBE ORDERS (OUTPATIENT)
Dept: MATERNAL FETAL MEDICINE | Facility: CLINIC | Age: 29
End: 2019-07-30

## 2019-07-30 DIAGNOSIS — O26.90 PREGNANCY RELATED CONDITION, ANTEPARTUM: Primary | ICD-10-CM

## 2019-07-30 NOTE — PROGRESS NOTES
S: Feels well and has no concerns..  Baby active.  Denies uterine cramping, vaginal bleeding or leaking of fluid  O: Vitals: /78 (Cuff Size: Adult Regular)   Wt 80.7 kg (178 lb)   LMP 2019 (Exact Date)   BMI 31.04 kg/m    BMI= Body mass index is 31.04 kg/m .  Exam:  Constitutional: healthy, alert and no distress  Respiratory: respirations even and unlabored  Gastrointestinal: Abdomen soft, non-tender. Fundus measures appropriate for gestational age. Fetal heart tones hear without difficulty and within normal limits  : Deferred  Psychiatric: mentation appears normal and affect normal/bright  A:    Diagnosis Comments   1. Abnormal ultrasonic finding on  screening of mother  [facility] REFERRAL-PREGNANCY    2. Encounter for supervision of normal first pregnancy in second trimester       P: Unable to visualize umbilical cord insertion on 20 week anatomy scan. Recommendation that we send patient to [facility].  Discussed GCT/repeat RPR for next visit, handout provided, reminded of longer appointment.  Tdap next visit; reviewed CDC recommendations and partner/family vaccination recommended as well.  Need for Rhogam? No, to be done next visit   Encouraged patient to call with any questions or concerns.  Return to clinic 4 weeks    SHRUTHI Bronson, EUFEMIA

## 2019-08-06 ENCOUNTER — PRE VISIT (OUTPATIENT)
Dept: MATERNAL FETAL MEDICINE | Facility: CLINIC | Age: 29
End: 2019-08-06

## 2019-08-09 ENCOUNTER — OFFICE VISIT (OUTPATIENT)
Dept: MATERNAL FETAL MEDICINE | Facility: CLINIC | Age: 29
End: 2019-08-09
Attending: ADVANCED PRACTICE MIDWIFE
Payer: COMMERCIAL

## 2019-08-09 ENCOUNTER — HOSPITAL ENCOUNTER (OUTPATIENT)
Dept: ULTRASOUND IMAGING | Facility: CLINIC | Age: 29
Discharge: HOME | End: 2019-08-09
Attending: ADVANCED PRACTICE MIDWIFE | Admitting: ADVANCED PRACTICE MIDWIFE
Payer: COMMERCIAL

## 2019-08-09 DIAGNOSIS — O43.122 VELAMENTOUS INSERTION OF UMBILICAL CORD IN SECOND TRIMESTER: Primary | ICD-10-CM

## 2019-08-09 DIAGNOSIS — O26.90 PREGNANCY RELATED CONDITION, ANTEPARTUM: ICD-10-CM

## 2019-08-09 PROCEDURE — 76811 OB US DETAILED SNGL FETUS: CPT

## 2019-08-09 NOTE — PROGRESS NOTES
Please refer to ultrasound report under 'Imaging' Studies of 'Chart Review' tabs.    Jaswinder Figueredo M.D.

## 2019-08-23 ENCOUNTER — HOSPITAL ENCOUNTER (OUTPATIENT)
Facility: CLINIC | Age: 29
Discharge: HOME | End: 2019-08-23
Attending: ADVANCED PRACTICE MIDWIFE | Admitting: ADVANCED PRACTICE MIDWIFE
Payer: COMMERCIAL

## 2019-08-23 VITALS
RESPIRATION RATE: 18 BRPM | TEMPERATURE: 98.1 F | SYSTOLIC BLOOD PRESSURE: 128 MMHG | WEIGHT: 181 LBS | HEIGHT: 63 IN | DIASTOLIC BLOOD PRESSURE: 82 MMHG | BODY MASS INDEX: 32.07 KG/M2

## 2019-08-23 PROBLEM — Z36.89 ENCOUNTER FOR TRIAGE IN PREGNANT PATIENT: Status: ACTIVE | Noted: 2019-08-23

## 2019-08-23 LAB
ALBUMIN UR-MCNC: NEGATIVE MG/DL
APPEARANCE UR: CLEAR
BACTERIA #/AREA URNS HPF: ABNORMAL /HPF
BILIRUB UR QL STRIP: NEGATIVE
COLOR UR AUTO: ABNORMAL
ERYTHROCYTE [DISTWIDTH] IN BLOOD BY AUTOMATED COUNT: 12.4 % (ref 10–15)
GLUCOSE BLDC GLUCOMTR-MCNC: 103 MG/DL (ref 70–99)
GLUCOSE UR STRIP-MCNC: NEGATIVE MG/DL
HCT VFR BLD AUTO: 35.6 % (ref 35–47)
HGB BLD-MCNC: 11.9 G/DL (ref 11.7–15.7)
HGB UR QL STRIP: NEGATIVE
KETONES UR STRIP-MCNC: NEGATIVE MG/DL
LEUKOCYTE ESTERASE UR QL STRIP: NEGATIVE
MCH RBC QN AUTO: 30.4 PG (ref 26.5–33)
MCHC RBC AUTO-ENTMCNC: 33.4 G/DL (ref 31.5–36.5)
MCV RBC AUTO: 91 FL (ref 78–100)
NITRATE UR QL: NEGATIVE
PH UR STRIP: 7 PH (ref 5–7)
PLATELET # BLD AUTO: 243 10E9/L (ref 150–450)
RBC # BLD AUTO: 3.92 10E12/L (ref 3.8–5.2)
RBC #/AREA URNS AUTO: 1 /HPF (ref 0–2)
SOURCE: ABNORMAL
SP GR UR STRIP: 1.01 (ref 1–1.03)
SQUAMOUS #/AREA URNS AUTO: 1 /HPF (ref 0–1)
UROBILINOGEN UR STRIP-MCNC: NORMAL MG/DL (ref 0–2)
WBC # BLD AUTO: 9.7 10E9/L (ref 4–11)
WBC #/AREA URNS AUTO: 1 /HPF (ref 0–5)

## 2019-08-23 PROCEDURE — 25000132 ZZH RX MED GY IP 250 OP 250 PS 637: Performed by: ADVANCED PRACTICE MIDWIFE

## 2019-08-23 PROCEDURE — 59025 FETAL NON-STRESS TEST: CPT | Mod: 26 | Performed by: ADVANCED PRACTICE MIDWIFE

## 2019-08-23 PROCEDURE — 96360 HYDRATION IV INFUSION INIT: CPT

## 2019-08-23 PROCEDURE — 81001 URINALYSIS AUTO W/SCOPE: CPT | Performed by: ADVANCED PRACTICE MIDWIFE

## 2019-08-23 PROCEDURE — 25800030 ZZH RX IP 258 OP 636: Performed by: ADVANCED PRACTICE MIDWIFE

## 2019-08-23 PROCEDURE — 99213 OFFICE O/P EST LOW 20 MIN: CPT | Mod: 25 | Performed by: ADVANCED PRACTICE MIDWIFE

## 2019-08-23 PROCEDURE — 85027 COMPLETE CBC AUTOMATED: CPT | Performed by: ADVANCED PRACTICE MIDWIFE

## 2019-08-23 PROCEDURE — 82962 GLUCOSE BLOOD TEST: CPT

## 2019-08-23 PROCEDURE — G0463 HOSPITAL OUTPT CLINIC VISIT: HCPCS

## 2019-08-23 RX ORDER — DIPHENHYDRAMINE HCL 25 MG
25 CAPSULE ORAL ONCE
Status: COMPLETED | OUTPATIENT
Start: 2019-08-23 | End: 2019-08-23

## 2019-08-23 RX ADMIN — DIPHENHYDRAMINE HYDROCHLORIDE 25 MG: 25 CAPSULE ORAL at 23:01

## 2019-08-23 RX ADMIN — SODIUM CHLORIDE, POTASSIUM CHLORIDE, SODIUM LACTATE AND CALCIUM CHLORIDE 1000 ML: 600; 310; 30; 20 INJECTION, SOLUTION INTRAVENOUS at 21:50

## 2019-08-23 ASSESSMENT — MIFFLIN-ST. JEOR: SCORE: 1520.14

## 2019-08-24 NOTE — PLAN OF CARE
Data: Patient assessed in the [facility] for back pain, dizziness.  Cervical exam not examined.  Membranes intact.  Contractions none.  Action:  Presumed adequate fetal oxygenation documented (see flow record). Discharge instructions reviewed.  Patient instructed to report change in fetal movement, vaginal leaking of fluid or bleeding, abdominal pain, or any concerns related to the pregnancy to her nurse/physician.    Response: Orders to discharge home per Josephine Alvares.  Patient verbalized understanding of education and verbalized agreement with plan. Discharged to home at 2305.

## 2019-08-24 NOTE — PROVIDER NOTIFICATION
08/23/19 2110   Provider Notification   Provider Name/Title EUFEMIA gonzalez   Method of Notification At Bedside   Request Evaluate in Person   EUFEMIA pickering at bedside to assess pt. Wants a BG done which was 103 and for pt to eat. Will re evaluate how pt feels after done eating.

## 2019-08-24 NOTE — DISCHARGE INSTRUCTIONS
Discharge Instruction for Undelivered Patients      You were seen for: back pain, dizziness  We Consulted: Josephine SKELTON CNM    Diet:   Drink 8 to 12 glasses of liquids (milk, juice, water) every day.  You may eat meals and snacks.     Activity:  Call your doctor or nurse midwife if your baby is moving less than usual.     Call your provider if you notice:  Swelling in your face or increased swelling in your hands or legs.  Headaches that are not relieved by Tylenol (acetaminophen).  Changes in your vision (blurring: seeing spots or stars.)  Nausea (sick to your stomach) and vomiting (throwing up).   Weight gain of 5 pounds or more per week.  Heartburn that doesn't go away.  Signs of bladder infection: pain when you urinate (use the toilet), need to go more often and more urgently.  The bag of beyer (rupture of membranes) breaks, or you notice leaking in your underwear.  Bright red blood in your underwear.  Abdominal (lower belly) or stomach pain.  For first baby: Contractions (tightening) less than 5 minutes apart for one hour or more.  *If less than 34 weeks: Contractions (tightenings) more than 6 times in one hour.  Increase or change in vaginal discharge (note the color and amount)    Follow-up:  As scheduled in the clinic      When to call   Discuss this with your nurse-midwife.  In general, you should call if you have leaking fluid, bleeding, decreased baby movement or if your contractions are about five minutes apart.  The midwife pager phone number is [phone]. Please call this number.  When you call, you will be prompted to enter the call back number you would like the on call provider to call back.  Please enter a 10 digit phone number. You will hear a few beeps after entering the phone number.  The on-call provider will call you back, perhaps from a blocked phone number. If you do not hear from the on call midwife within 20 minutes, please call our main clinic phone [phone].    [facility]      Your baby will be born at the [facility], which is on the fourth floor of [facility].  Call ([phone]) to arrange a tour of the Birth[facility].     Packing your bag    Bring clothing for you and your baby, and anything that may make your birth/stay more comfortable.  It is a good idea to practice installing the car seat before you deliver.  Please do not bring valuables to the hospital.

## 2019-08-24 NOTE — PROVIDER NOTIFICATION
08/23/19 2010   Provider Notification   Provider Name/Title EUFEMIA Burton   Method of Notification At Bedside   Request Evaluate in Person   EUFEMIA Hale at bedside to assess. CNM wants BG done which was 103 and wants pt to eat and re evaluate how pt is feeling.

## 2019-08-24 NOTE — PROVIDER NOTIFICATION
08/23/19 2145   Provider Notification   Provider Name/Title EUFEMIA Burton   Method of Notification At Bedside   Request Evaluate in Person   Notification Reason Status Update   Pt states she feels the same after eating a meal. Josephine gave orders for 1 L LR bolus and a CBC.

## 2019-08-24 NOTE — PLAN OF CARE
Data: Patient presented to [facility]: 2019  8:09 PM.  Reason for maternal/fetal assessment is dizziness and back pain. Patient reports having a dizzy spell while laying down an hour ago but has since resolved, lower dull back pain that comes and goes and c/o feeling hot.  Patient is a .  Prenatal record reviewed. Pregnancy  has been uncomplicated.  Gestational Age 26w4d. VSS. Fetal movement present. Patient denies uterine contractions, leaking of vaginal fluid/rupture of membranes, vaginal bleeding, abdominal pain, pelvic pressure, nausea, vomiting, headache, visual disturbances, epigastric or URQ pain, significant edema. Support person is present.   Action: Verbal consent for EFM. Triage assessment completed. Bill of rights reviewed.  Response: Patient verbalized agreement with plan. Will contact Dr Josephine Alvares with update and further orders.

## 2019-08-24 NOTE — PROGRESS NOTES
CHIEF COMPLAINT  ========================  Aliya Yip is a 29 year old patient presenting today at 26w4d for evaluation of flushing.  She is here today with Pete who she does live with.     Patient's last menstrual period was 2019 (exact date).  Estimated Date of Delivery: Estimated Date of Delivery: 2019     HPI: Patient here with  complaining of face flushing. Reports that she and her  were going to dinner and when she arrived started feeling hot and dizzy. Denies new foods, medications, soaps, detergents, endocrine disorders, heart palpitations, nausea, vaginal or urinary symptoms, headache, hematuria, rash, itching, HSV symptoms, bowel changes, fever or illness. Denies recent intercourse that could cause hormonal fluctuations. Does not work in  or around sick children, unlikely TORCH infection. States she hadn't eaten since lunch.  Also reporting back pain. State this has worsened over the past week.  Good fetal movement.   ==================     CONTRACTIONS: none  ABDOMINAL PAIN: none  FETAL MOVEMENT: active  VAGINAL BLEEDING: none  RUPTURE OF MEMBRANES: no  PELVIC PAIN: none  OTHER: low back pain   REVIEW OF SYSTEMS  =====================  C: NEGATIVE for fever, chills POSITIVE FLUSHING   R: NEGATIVE for significant cough or SOB  CV: NEGATIVE for chest pain, palpitations or varicosities  GI: NEGATIVE for nausea, abdominal pain, heartburn, or change in bowel habits  : NEGATIVE for frequency, dysuria, or hematuria  NEURO: POSITIVE for dizziness/lightheadedness  P: NEGATIVE for changes in mood or affect  HISTORIES  ==============  ALLERGIES:  No Known Allergies  PAST MEDICAL HISTORY  Past Medical History:   Diagnosis Date     Genital herpes      UTI (urinary tract infection)      FAMILY HISTORY  Family History   Problem Relation Age of Onset     Diabetes Mother      Breast Cancer Mother      Hypertension Mother      OB HISTORY  OB History    Para Term  AB  "Living   1 0 0 0 0 0   SAB TAB Ectopic Multiple Live Births   0 0 0 0 0      # Outcome Date GA Lbr Beto/2nd Weight Sex Delivery Anes PTL Lv   1 Current                EXAM  ============  Vital signs stable, afebrile and BP is /82   Temp 98.1  F (36.7  C) (Axillary)   Resp 18   Ht 1.6 m (5' 3\")   Wt 82.1 kg (181 lb)   LMP 02/18/2019 (Exact Date)   BMI 32.06 kg/m      GENERAL APPEARANCE: healthy, alert and no distress  RESP: no respiratory distress   CV: regular rates   ABDOMEN:  soft, nontender,non-tender between contractions no epigastric pain  NEURO: Denies headache, blurred vision  PSYCH: mentation appears normal. and affect normal/bright  LEGS: Reflexes normal bilaterally  CONTRACTIONS:   none   none  FETAL HEART TONES:  135 with moderate variability, accelerations-yes, decels none.  NST: REACTIVE  CST: NOT DONE  PELVIC EXAM:deferred     BLOOD: no  DISCHARGE: physiologic     ROM: No  NITROZINE: not done  FERN: not done  POOLING: not done    LABS:  Results for orders placed or performed during the hospital encounter of 08/23/19   UA with Microscopic reflex to Culture   Result Value Ref Range    Color Urine Light Yellow     Appearance Urine Clear     Glucose Urine Negative NEG^Negative mg/dL    Bilirubin Urine Negative NEG^Negative    Ketones Urine Negative NEG^Negative mg/dL    Specific Gravity Urine 1.008 1.003 - 1.035    Blood Urine Negative NEG^Negative    pH Urine 7.0 5.0 - 7.0 pH    Protein Albumin Urine Negative NEG^Negative mg/dL    Urobilinogen mg/dL Normal 0.0 - 2.0 mg/dL    Nitrite Urine Negative NEG^Negative    Leukocyte Esterase Urine Negative NEG^Negative    Source Midstream Urine     WBC Urine 1 0 - 5 /HPF    RBC Urine 1 0 - 2 /HPF    Bacteria Urine Few (A) NEG^Negative /HPF    Squamous Epithelial /HPF Urine 1 0 - 1 /HPF   Glucose by meter   Result Value Ref Range    Glucose 103 (H) 70 - 99 mg/dL   CBC with platelets   Result Value Ref Range    WBC 9.7 4.0 - 11.0 10e9/L    RBC Count 3.92 " 3.8 - 5.2 10e12/L    Hemoglobin 11.9 11.7 - 15.7 g/dL    Hematocrit 35.6 35.0 - 47.0 %    MCV 91 78 - 100 fl    MCH 30.4 26.5 - 33.0 pg    MCHC 33.4 31.5 - 36.5 g/dL    RDW 12.4 10.0 - 15.0 %    Platelet Count 243 150 - 450 10e9/L       UA and CBC   DIAGNOSIS  ===========  26w4d,encounter for triage in labor and delivery 2nd trimester   Facial flushing   PLAN  ===========  Home with PTL instructions per discharge instruction form and Medications given  -Urine normal, CBC normal, blood sugar normal.   -Give 1000mL bag LR fluids, have patient eat something.    -Flushing lessened after food, rest and 1L bolus.    -discharge to home with prescription for benadryl, push fluids, eat small frequent snacks throughout the day. Use cool cloth to keep cool.  Has appointment  With Ruba Sage, completing blood sugar glucose test. Recommend checking TSH at this visit as well.   -CNM pager provided, instructed to page if symptoms worsen    SHRUTHI Cuevas CNM, EUFEMIA

## 2019-08-24 NOTE — PROVIDER NOTIFICATION
08/23/19 2230   Provider Notification   Provider Name/Title EUFEMIA Burton   Method of Notification At Bedside   Request Evaluate in Person   EUFEIMA Burton at bedside to assess pt. CBC results normal. EUFEMIA Burton gave discharge orders and states pt can go home after IV fluids finish and can receive 25 mg benadryl prior to leaving.

## 2019-08-28 ENCOUNTER — PRENATAL OFFICE VISIT (OUTPATIENT)
Dept: OBGYN | Facility: CLINIC | Age: 29
End: 2019-08-28
Payer: COMMERCIAL

## 2019-08-28 VITALS — DIASTOLIC BLOOD PRESSURE: 84 MMHG | WEIGHT: 189 LBS | BODY MASS INDEX: 33.48 KG/M2 | SYSTOLIC BLOOD PRESSURE: 122 MMHG

## 2019-08-28 DIAGNOSIS — Z34.02 ENCOUNTER FOR SUPERVISION OF NORMAL FIRST PREGNANCY IN SECOND TRIMESTER: ICD-10-CM

## 2019-08-28 LAB
ERYTHROCYTE [DISTWIDTH] IN BLOOD BY AUTOMATED COUNT: 12.6 % (ref 10–15)
HCT VFR BLD AUTO: 33.7 % (ref 35–47)
HGB BLD-MCNC: 11.5 G/DL (ref 11.7–15.7)
MCH RBC QN AUTO: 31.1 PG (ref 26.5–33)
MCHC RBC AUTO-ENTMCNC: 34.1 G/DL (ref 31.5–36.5)
MCV RBC AUTO: 91 FL (ref 78–100)
PLATELET # BLD AUTO: 227 10E9/L (ref 150–450)
RBC # BLD AUTO: 3.7 10E12/L (ref 3.8–5.2)
WBC # BLD AUTO: 8.7 10E9/L (ref 4–11)

## 2019-08-28 PROCEDURE — 90715 TDAP VACCINE 7 YRS/> IM: CPT | Performed by: ADVANCED PRACTICE MIDWIFE

## 2019-08-28 PROCEDURE — 85027 COMPLETE CBC AUTOMATED: CPT | Performed by: ADVANCED PRACTICE MIDWIFE

## 2019-08-28 PROCEDURE — 90471 IMMUNIZATION ADMIN: CPT | Performed by: ADVANCED PRACTICE MIDWIFE

## 2019-08-28 PROCEDURE — 36415 COLL VENOUS BLD VENIPUNCTURE: CPT | Performed by: ADVANCED PRACTICE MIDWIFE

## 2019-08-28 PROCEDURE — 82950 GLUCOSE TEST: CPT | Performed by: ADVANCED PRACTICE MIDWIFE

## 2019-08-28 PROCEDURE — 99207 ZZC PRENATAL VISIT: CPT | Performed by: ADVANCED PRACTICE MIDWIFE

## 2019-08-28 PROCEDURE — 84443 ASSAY THYROID STIM HORMONE: CPT | Performed by: ADVANCED PRACTICE MIDWIFE

## 2019-08-28 PROCEDURE — 86780 TREPONEMA PALLIDUM: CPT | Performed by: ADVANCED PRACTICE MIDWIFE

## 2019-08-28 NOTE — PATIENT INSTRUCTIONS
GESTATIONAL DIABETES SCREENING    All pregnant women are screened at least once for diabetes as part of their prenatal care. A woman has gestational diabetes if she has high blood sugars for the first time during pregnancy.      Diabetes can harm your health and the health of your baby.  But if we find the diabetes early in pregnancy we will watch your health closely and prevent further problems.       We will check for gestational diabetes during 28 week visit. Please note you can not do this prior to 24 weeks of gestation.      Plan to spend about an hour at the clinic.  When you check in let us know that you will be having your diabetes screening that day.       We will give you a 50 gram glucose drink that you have 5 minutes to consume.  Exactly one hour later you will have draw blood from your arm to check your blood sugar level.      We will call you to let you know if your results are normal.  If the results are normal no more testing will be needed.  If your results are not normal we will discuss follow up testing with you.        You may eat prior to the testing but it is not recommended to eat or drink very sweet things such as pop, juice, candy or dessert type foods.  Eat a high protein, low carb meals prior to testing.    If you have any questions please call:    [facility]   [phone]

## 2019-08-28 NOTE — NURSING NOTE
"Chief Complaint   Patient presents with     Prenatal Care       Initial /84 (BP Location: Right arm, Cuff Size: Adult Regular)   Wt 85.7 kg (189 lb)   LMP 2019 (Exact Date)   BMI 33.48 kg/m   Estimated body mass index is 33.48 kg/m  as calculated from the following:    Height as of 19: 1.6 m (5' 3\").    Weight as of this encounter: 85.7 kg (189 lb).  BP completed using cuff size: regular    Questioned patient about current smoking habits.  Pt. has never smoked.          Gio Quinn CMA             "

## 2019-08-28 NOTE — PROGRESS NOTES
S: Feels very anxious about velamentous insertion.  Baby active.  Denies uterine cramping, vaginal bleeding or leaking of fluid  O: Vitals: /84 (BP Location: Right arm, Cuff Size: Adult Regular)   Wt 85.7 kg (189 lb)   LMP 02/18/2019 (Exact Date)   BMI 33.48 kg/m    BMI= Body mass index is 33.48 kg/m .  Exam:  Constitutional: healthy, alert and no distress  Respiratory: respirations even and unlabored  Gastrointestinal: Abdomen soft, non-tender. Fundus measures appropriate for gestational age. Fetal heart tones hear without difficulty and within normal limits  : Deferred  Psychiatric: mentation appears normal and affect normal/bright  A:     ICD-10-CM    1. Encounter for supervision of normal first pregnancy in second trimester Z34.02 Glucose tolerance, gest screen, 1 hour     Treponema Abs w Reflex to RPR and Titer     CBC with platelets     TDAP, IM (10 - 64 YRS) - Adacel     ADMIN 1st VACCINE     TSH with free T4 reflex     P: GCT/repeat RPR today, handout provided.  Tdap given; reviewed CDC recommendations and partner/family vaccination recommended as well.  Need for Rhogam? No.   Explained what a velamentous insertion is, and potential concerns.   Encouraged patient to call with any questions or concerns.  Return to clinic 2 weeks    SHRUTHI Bronson, EUFEMIA

## 2019-08-29 LAB
GLUCOSE 1H P 50 G GLC PO SERPL-MCNC: 121 MG/DL (ref 60–129)
T PALLIDUM AB SER QL: NONREACTIVE
TSH SERPL DL<=0.005 MIU/L-ACNC: 0.89 MU/L (ref 0.4–4)

## 2019-09-16 ENCOUNTER — PRENATAL OFFICE VISIT (OUTPATIENT)
Dept: OBGYN | Facility: CLINIC | Age: 29
End: 2019-09-16
Payer: COMMERCIAL

## 2019-09-16 VITALS — BODY MASS INDEX: 35.07 KG/M2 | WEIGHT: 198 LBS | SYSTOLIC BLOOD PRESSURE: 128 MMHG | DIASTOLIC BLOOD PRESSURE: 72 MMHG

## 2019-09-16 DIAGNOSIS — Z34.03 ENCOUNTER FOR SUPERVISION OF NORMAL FIRST PREGNANCY IN THIRD TRIMESTER: Primary | ICD-10-CM

## 2019-09-16 PROCEDURE — 99207 ZZC PRENATAL VISIT: CPT | Performed by: ADVANCED PRACTICE MIDWIFE

## 2019-09-16 NOTE — NURSING NOTE
"Chief Complaint   Patient presents with     Prenatal Care     30w        Initial /72 (BP Location: Right arm, Cuff Size: Adult Regular)   Wt 89.8 kg (198 lb)   LMP 2019 (Exact Date)   BMI 35.07 kg/m   Estimated body mass index is 35.07 kg/m  as calculated from the following:    Height as of 19: 1.6 m (5' 3\").    Weight as of this encounter: 89.8 kg (198 lb).  BP completed using cuff size: regular    Questioned patient about current smoking habits.  Pt. has never smoked.          The following HM Due: NONE    Gaby Story CMA         "

## 2019-09-16 NOTE — PROGRESS NOTES
S: Feels well and has no concerns.  Baby active.  Denies uterine cramping, vaginal bleeding or leaking of fluid  O: Vitals: /72 (BP Location: Right arm, Cuff Size: Adult Regular)   Wt 89.8 kg (198 lb)   LMP 02/18/2019 (Exact Date)   BMI 35.07 kg/m    BMI= Body mass index is 35.07 kg/m .  Exam:  Constitutional: healthy, alert and no distress  Respiratory: respirations even and unlabored  Gastrointestinal: Abdomen soft, non-tender. Fundus measures appropriate for gestational age. Fetal heart tones hear without difficulty and within normal limits  : Deferred  Psychiatric: mentation appears normal and affect normal/bright  A:     ICD-10-CM    1. Encounter for supervision of normal first pregnancy in third trimester Z34.03      P: Discussed plans for labor.  Encouraged patient to call with any questions or concerns.  Return to clinic 2 weeks    SHRUTHI Bronson, EUFEMIA

## 2019-09-16 NOTE — PATIENT INSTRUCTIONS
Weeks 27 thru 32 - Gestational Age (Fetal Age - Weeks 25 thru 30):  The fetus really fills out over these next few weeks, storing fat on the body, reaching about 15-17 inches long and weighing about 4-4   lbs by the 32nd week. The lungs are not fully mature yet, but some rhythmic breathing movements are occurring. The bones are fully developed, but are still soft and pliable. The fetus is storing its own calcium, iron and phosphorus. The eyelids open after being closed, since the end of the first trimester.

## 2019-09-24 ENCOUNTER — HOSPITAL ENCOUNTER (OUTPATIENT)
Dept: ULTRASOUND IMAGING | Facility: CLINIC | Age: 29
Discharge: HOME | End: 2019-09-24
Attending: OBSTETRICS & GYNECOLOGY | Admitting: OBSTETRICS & GYNECOLOGY
Payer: COMMERCIAL

## 2019-09-24 ENCOUNTER — OFFICE VISIT (OUTPATIENT)
Dept: MATERNAL FETAL MEDICINE | Facility: CLINIC | Age: 29
End: 2019-09-24
Attending: OBSTETRICS & GYNECOLOGY
Payer: COMMERCIAL

## 2019-09-24 DIAGNOSIS — O43.123 VELAMENTOUS INSERTION OF UMBILICAL CORD IN THIRD TRIMESTER: Primary | ICD-10-CM

## 2019-09-24 DIAGNOSIS — O43.122 VELAMENTOUS INSERTION OF UMBILICAL CORD IN SECOND TRIMESTER: ICD-10-CM

## 2019-09-24 PROCEDURE — 76816 OB US FOLLOW-UP PER FETUS: CPT

## 2019-09-24 NOTE — PROGRESS NOTES
Please see ultrasound report under imaging tab for details on ultrasound performed today.    Génesis Dee MD  , OB/GYN  Maternal-Fetal Medicine  [email or web address]  [phone] (Academic office)  [phone] (Pager)

## 2019-09-30 ENCOUNTER — PRENATAL OFFICE VISIT (OUTPATIENT)
Dept: OBGYN | Facility: CLINIC | Age: 29
End: 2019-09-30
Payer: COMMERCIAL

## 2019-09-30 VITALS — DIASTOLIC BLOOD PRESSURE: 74 MMHG | WEIGHT: 202 LBS | SYSTOLIC BLOOD PRESSURE: 126 MMHG | BODY MASS INDEX: 35.78 KG/M2

## 2019-09-30 DIAGNOSIS — Z34.03 ENCOUNTER FOR SUPERVISION OF NORMAL FIRST PREGNANCY IN THIRD TRIMESTER: ICD-10-CM

## 2019-09-30 PROCEDURE — 99207 ZZC PRENATAL VISIT: CPT | Performed by: ADVANCED PRACTICE MIDWIFE

## 2019-09-30 NOTE — NURSING NOTE
"Chief Complaint   Patient presents with     Prenatal Care     32w        Initial /74   Wt 91.6 kg (202 lb)   LMP 2019 (Exact Date)   BMI 35.78 kg/m   Estimated body mass index is 35.78 kg/m  as calculated from the following:    Height as of 19: 1.6 m (5' 3\").    Weight as of this encounter: 91.6 kg (202 lb).  BP completed using cuff size: regular    Questioned patient about current smoking habits.  Pt. has never smoked.          The following HM Due: NONE    Gaby Story CMA    "

## 2019-10-01 NOTE — PROGRESS NOTES
S: Feels well and has no concerns.  Baby active.  Denies uterine cramping, vaginal bleeding or leaking of fluid  O: Vitals: /74 (BP Location: Right arm, Cuff Size: Adult Regular)   Wt 91.6 kg (202 lb)   LMP 02/18/2019 (Exact Date)   BMI 35.78 kg/m    BMI= Body mass index is 35.78 kg/m .  Exam:  Constitutional: healthy, alert and no distress  Respiratory: respirations even and unlabored  Gastrointestinal: Abdomen soft, non-tender. Fundus measures appropriate for gestational age. Fetal heart tones hear without difficulty and within normal limits  : Deferred  Psychiatric: mentation appears normal and affect normal/bright  A:     ICD-10-CM    1. Encounter for supervision of normal first pregnancy in third trimester Z34.03      P: Discussed plans for labor. Given written and verbal information about the stages of labor.  Encouraged patient to call with any questions or concerns.  Return to clinic 2 weeks    SHRUTHI Bronson, EUFEMIA

## 2019-10-01 NOTE — PATIENT INSTRUCTIONS
Patient Education     Stages of Labor    Labor has 3 stages. Your healthcare provider may talk about the progress of your labor with certain words. One of these is your baby s position. Another is your baby s station. And the effacement and dilation of your cervix will be noted. Read below to learn about these terms and the 3 stages of labor.  Your baby moves into position  Position is your baby s placement in your uterus. Your baby may be facing left or right. He or she may be head first or feet first. Station refers to how far your baby has moved down into your pelvic cavity.  First stage of labor  During the first stage of labor, contractions of the uterus help your cervix thin (efface). They also help it widen (dilate). This will help your baby pass through the birth canal (vagina). At first your contractions will not come that often or last that long. But as time passes, they will come more often, they may be more painful, and they will last longer. They will last about 30 to 60 seconds each. The first stage of labor lasts until the cervix is fully dilated.  Second stage of labor  When your cervix is fully dilated, the second stage of labor begins. In this stage, you will have stronger contractions of your uterus that will help your baby move down the birth canal. They may happen every 2 to 5 minutes. They may last from 45 to 90 seconds each. Your healthcare provider will ask you to push with each contraction. Try to rest between the contractions if you can. Your baby is delivered at the end of this stage of labor.  Third stage of labor  The third stage of labor comes after your baby is born. This is when the afterbirth (placenta) comes out of your uterus. Your uterus will continue to contract. But the contractions are much milder than before.  Date Last Reviewed: 10/1/2017    6021-3624 [employer]. [address] [place] [address], [place] [address]. All rights reserved. This information is not intended  as a substitute for professional medical care. Always follow your healthcare professional's instructions.           Patient Education     Labor and Childbirth: Your Body Prepares  Labor is the series of uterine contractions that dilate (open) and efface (thin) your cervix for birth. Your due date is a guide to when labor will begin, but babies often come days or weeks before or after due dates. Even so, labor need not take you by surprise. In the last weeks of pregnancy, you or your healthcare provider may notice changes that mean labor is near.    Changes in your body  Physical changes often signal that your baby will soon be born:    Discharge from your vagina may increase and become thicker. You may notice a pink or brownish discharge called the bloody show.    The mucous plug may break down over a few weeks or all at once. Losing the plug doesn t mean that labor will start right away.    You may feel Archuleta Roman contractions (false labor). These irregular contractions start to soften and thin the cervix. Many women mistake these contractions for true labor. They may be more noticeable towards the end of the day.    Feeling like the baby has dropped lower. In preparation for birth, the baby's head has settled deep into your pelvis.  Date Last Reviewed: 2/1/2018 2000-2018 [employer]. [address] [place] [address], [place] [address]. All rights reserved. This information is not intended as a substitute for professional medical care. Always follow your healthcare professional's instructions.

## 2019-10-16 ENCOUNTER — PRENATAL OFFICE VISIT (OUTPATIENT)
Dept: OBGYN | Facility: CLINIC | Age: 29
End: 2019-10-16
Payer: COMMERCIAL

## 2019-10-16 VITALS — WEIGHT: 205 LBS | DIASTOLIC BLOOD PRESSURE: 80 MMHG | SYSTOLIC BLOOD PRESSURE: 124 MMHG | BODY MASS INDEX: 36.31 KG/M2

## 2019-10-16 DIAGNOSIS — Z23 NEED FOR PROPHYLACTIC VACCINATION AND INOCULATION AGAINST INFLUENZA: Primary | ICD-10-CM

## 2019-10-16 DIAGNOSIS — Z34.03 ENCOUNTER FOR SUPERVISION OF NORMAL FIRST PREGNANCY IN THIRD TRIMESTER: ICD-10-CM

## 2019-10-16 PROCEDURE — 99207 ZZC PRENATAL VISIT: CPT | Performed by: ADVANCED PRACTICE MIDWIFE

## 2019-10-16 PROCEDURE — 90686 IIV4 VACC NO PRSV 0.5 ML IM: CPT | Performed by: ADVANCED PRACTICE MIDWIFE

## 2019-10-16 PROCEDURE — 90471 IMMUNIZATION ADMIN: CPT | Performed by: ADVANCED PRACTICE MIDWIFE

## 2019-10-16 NOTE — PROGRESS NOTES
S: Feels well except for swelling in feet in PM.  Baby active.  Denies uterine cramping, vaginal bleeding or leaking of fluid  O: Vitals: /80 (BP Location: Right arm, Cuff Size: Adult Regular)   Wt 93 kg (205 lb)   LMP 02/18/2019 (Exact Date)   BMI 36.31 kg/m    BMI= Body mass index is 36.31 kg/m .  Exam:  Constitutional: healthy, alert and no distress  Respiratory: respirations even and unlabored  Gastrointestinal: Abdomen soft, non-tender. Fundus measures appropriate for gestational age. Fetal heart tones hear without difficulty and within normal limits  : Deferred  Psychiatric: mentation appears normal and affect normal/bright  A:     ICD-10-CM    1. Need for prophylactic vaccination and inoculation against influenza Z23 INFLUENZA VACCINE IM > 6 MONTHS VALENT IIV4 [46692]     Vaccine Administration, Initial [83907]   2. Encounter for supervision of normal first pregnancy in third trimester Z34.03      P: Discussed GBS screen for next visit. Discussed plans for labor. Comfort measures for edema discussed. Advised patient to elevate feet multiple times a day.  Encouraged patient to call with any questions or concerns.  Return to clinic 2 weeks    SHRUTHI Bronson, EUFEMIA

## 2019-10-16 NOTE — PATIENT INSTRUCTIONS
GROUP B STREP    Group B Strep (GBS) is a common bacteria that is sometimes found in the vagina, urinary tract or rectum.  It is not harmful typically to adults but can cause serious illness in newborns.  It occasionally is passed from mother to baby during birth.   It is important to test in pregnancy.  When a woman is found to be positive for GBS, either at the first prenatal visit or by taking a culture at 36 weeks, treatment will be offered to reduce the chance of spreading the bacteria to the baby.       Treatment consists of either oral antibiotics early in pregnancy or antibiotics given by IV during labor if testing is positive at 36 weeks.      Even without treatment the baby rarely (1-2% of the time) gets infected.  With treatment the baby almost never gets infected.       There really isn't anything you can do to keep from getting or being positive for GBS.  It isn't sexually transmitted and there are no symptoms if you are positive.       Your midwife will discuss your results with you and make recommendations for treatment.    Patient Education     Adapting to Pregnancy: Third Trimester    Although common during pregnancy, some discomforts may seem worse in the final weeks. Simple lifestyle changes can help. Take care of yourself. And ask your partner to help out with small tasks.  Limiting leg problems  Ways to combat leg issues:    Wear support hose all day.    Avoid snug shoes and clothes that bind, like tight pants and socks with elastic tops.    Sit with your feet and legs raised often.  Caring for your breasts  Tips to follow include:    Wash with plain water. Avoid using harsh soaps or rubbing alcohol. They may cause dryness.    Wear a nursing bra for extra support. It can also hide any leaks from your nipples.  Controlling hemorrhoids  Ways to avoid hemorrhoids include:    Eat foods that are high in fiber. Also, exercise and drink enough fluids. This will reduce constipation and  hemorrhoids.    Sleep and nap on your side. This limits pressure on the veins of your rectum.    Try not to stand or sit for long periods.  Controlling back pain  As your body changes during pregnancy, your back must work in new ways. Back pain is due to many causes. Physical changes in your body can strain your back and its supporting muscles. Also, hormones (chemicals that carry messages throughout the body) increase during pregnancy. This can affect how your muscles and joints work together. All of these changes can lead to pain. Pain may be felt in the upper or lower back. Pain is also common in the pelvis. Some pregnant women have sciatica. This is pain caused by pressure on the sciatic nerve running down the back of the leg. Ask your healthcare provider for specific tips and exercises to help control your back pain.  Tips to help you rest  Good rest and sleep will help you feel better. Here are some ideas:    Ask your partner to massage your shoulders, neck, or back.    Limit the errands you do each day.    Lie down in the afternoon or after work for a few minutes.    Take a warm bath before you go to sleep.    Drink warm milk or teas without caffeine.    Avoid coffee, black tea, and cola.  Stopping heartburn    Avoid spicy, greasy, fried, or acidic foods.    Eat small amounts more often. Eat slowly.   Wait 2 hours after eating before lying down.    Sleep with your upper body raised 6 inches.   Managing mood swings  Ways to manage mood swings include:    Know that mood changes are normal.    Exercise often, but get plenty of rest.    Address any concerns and limit stress. Talking to your partner, other women, or your healthcare provider may help.  Dealing with urinary frequency  Tips to deal with having to urinate often include:    Drink plenty of water all day. If you drink a lot in the evening, though, you may have to get up more in the night.    Limit coffee, black tea, and cola.  Date Last Reviewed:  2/1/2018 2000-2018 [employer]. [address] [place] [address], [place] [address]. All rights reserved. This information is not intended as a substitute for professional medical care. Always follow your healthcare professional's instructions.

## 2019-10-28 ENCOUNTER — PRENATAL OFFICE VISIT (OUTPATIENT)
Dept: OBGYN | Facility: CLINIC | Age: 29
End: 2019-10-28
Payer: COMMERCIAL

## 2019-10-28 VITALS — WEIGHT: 209 LBS | DIASTOLIC BLOOD PRESSURE: 78 MMHG | BODY MASS INDEX: 37.02 KG/M2 | SYSTOLIC BLOOD PRESSURE: 118 MMHG

## 2019-10-28 DIAGNOSIS — Z34.03 ENCOUNTER FOR SUPERVISION OF NORMAL FIRST PREGNANCY IN THIRD TRIMESTER: Primary | ICD-10-CM

## 2019-10-28 PROCEDURE — 87653 STREP B DNA AMP PROBE: CPT | Performed by: ADVANCED PRACTICE MIDWIFE

## 2019-10-28 PROCEDURE — 99207 ZZC PRENATAL VISIT: CPT | Performed by: ADVANCED PRACTICE MIDWIFE

## 2019-10-28 NOTE — NURSING NOTE
"Chief Complaint   Patient presents with     Prenatal Care     36w, GBS today       Initial /78 (BP Location: Right arm, Cuff Size: Adult Regular)   Wt 94.8 kg (209 lb)   LMP 2019 (Exact Date)   BMI 37.02 kg/m   Estimated body mass index is 37.02 kg/m  as calculated from the following:    Height as of 19: 1.6 m (5' 3\").    Weight as of this encounter: 94.8 kg (209 lb).  BP completed using cuff size: regular    Questioned patient about current smoking habits.  Pt. has never smoked.          The following HM Due: NONE  Gaby Story CMA      "

## 2019-10-28 NOTE — PROGRESS NOTES
S: Feels well, having some swelling in her feet and calves. Has not gotten any compression stockings yet.   Baby active.  Denies uterine cramping, vaginal bleeding or leaking of fluid. No headache, increase in edema, no epigastric pain.   O: Vitals: /78 (BP Location: Right arm, Cuff Size: Adult Regular)   Wt 94.8 kg (209 lb)   LMP 02/18/2019 (Exact Date)   BMI 37.02 kg/m    BMI= Body mass index is 37.02 kg/m .  Exam:  Constitutional: healthy, alert and no distress  Respiratory: respirations even and unlabored  Gastrointestinal: Abdomen soft, non-tender. Fundus measures appropriate for gestational age. Fetal heart tones hear without difficulty and within normal limits  : Normal external genitalia without lesions and cervix 0/0/-4  Psychiatric: mentation appears normal and affect normal/bright  A:     ICD-10-CM    1. Encounter for supervision of normal first pregnancy in third trimester Z34.03 Strep, Group B by PCR     P: MFM US tomorrow for velamentous cord insertion. Will confirm cephalic at that time due to fetal presenting part not well applied to the cervix.   Does have a history of HSV many years ago, has never had any outbreaks in years. Did not have any outbreaks in pregnancy. No prophylactic treatment indicated due to no outbreaks in pregnancy.   Labor signs and symptoms discussed, aware of numbers to call  Discussed warning signs of PIH/preeclampsia and patient will monitor.  GBS screen completed. Discussed plans for labor.   Recommended compression stockings, elevating feet, changing positions, increasing fluids for swelling  Encouraged patient to call with any questions or concerns.  Return to clinic 1 weeks    Giulia Valentin CNM, MARIO-BC

## 2019-10-29 ENCOUNTER — OFFICE VISIT (OUTPATIENT)
Dept: MATERNAL FETAL MEDICINE | Facility: CLINIC | Age: 29
End: 2019-10-29
Attending: OBSTETRICS & GYNECOLOGY
Payer: COMMERCIAL

## 2019-10-29 ENCOUNTER — HOSPITAL ENCOUNTER (OUTPATIENT)
Dept: ULTRASOUND IMAGING | Facility: CLINIC | Age: 29
Discharge: HOME | End: 2019-10-29
Attending: OBSTETRICS & GYNECOLOGY | Admitting: OBSTETRICS & GYNECOLOGY
Payer: COMMERCIAL

## 2019-10-29 DIAGNOSIS — O43.123 VELAMENTOUS INSERTION OF UMBILICAL CORD IN THIRD TRIMESTER: ICD-10-CM

## 2019-10-29 DIAGNOSIS — O43.123 VELAMENTOUS INSERTION OF UMBILICAL CORD IN THIRD TRIMESTER: Primary | ICD-10-CM

## 2019-10-29 DIAGNOSIS — O36.63X0 EXCESSIVE FETAL GROWTH AFFECTING MANAGEMENT OF PREGNANCY IN THIRD TRIMESTER, SINGLE OR UNSPECIFIED FETUS: ICD-10-CM

## 2019-10-29 LAB
GP B STREP DNA SPEC QL NAA+PROBE: NEGATIVE
SPECIMEN SOURCE: NORMAL

## 2019-10-29 PROCEDURE — 76816 OB US FOLLOW-UP PER FETUS: CPT

## 2019-10-29 NOTE — PROGRESS NOTES
"Please see \"Imaging\" tab under \"Chart Review\" for details of today's visit.    Arthur Boykin    "

## 2019-11-06 ENCOUNTER — PRENATAL OFFICE VISIT (OUTPATIENT)
Dept: OBGYN | Facility: CLINIC | Age: 29
End: 2019-11-06
Payer: COMMERCIAL

## 2019-11-06 ENCOUNTER — HEALTH MAINTENANCE LETTER (OUTPATIENT)
Age: 29
End: 2019-11-06

## 2019-11-06 VITALS — BODY MASS INDEX: 37.71 KG/M2 | DIASTOLIC BLOOD PRESSURE: 80 MMHG | WEIGHT: 212.9 LBS | SYSTOLIC BLOOD PRESSURE: 122 MMHG

## 2019-11-06 DIAGNOSIS — Z34.03 ENCOUNTER FOR SUPERVISION OF NORMAL FIRST PREGNANCY IN THIRD TRIMESTER: Primary | ICD-10-CM

## 2019-11-06 PROCEDURE — 99207 ZZC PRENATAL VISIT: CPT | Performed by: ADVANCED PRACTICE MIDWIFE

## 2019-11-06 NOTE — NURSING NOTE
"Chief Complaint   Patient presents with     Prenatal Care     37 weeks 2 days, no questions or concerns. No c/o vaginal bleeding, leaking of fluids, contractions. Feeling fetal movement daily       Initial /80 (BP Location: Left arm, Patient Position: Chair, Cuff Size: Adult Regular)   Wt 96.6 kg (212 lb 14.4 oz)   LMP 2019 (Exact Date)   BMI 37.71 kg/m   Estimated body mass index is 37.71 kg/m  as calculated from the following:    Height as of 19: 1.6 m (5' 3\").    Weight as of this encounter: 96.6 kg (212 lb 14.4 oz).  BP completed using cuff size: regular    Questioned patient about current smoking habits.  Pt. has never smoked.          The following HM Due: NONE      Ralph Thao CMA               "

## 2019-11-06 NOTE — PATIENT INSTRUCTIONS
"Weeks 37 thru 40 - Gestational Age (Fetal Age - Weeks 35 thru 38):  At 38 weeks the fetus is considered full term and is ready to make its appearance at any time. As your baby becomes bigger, you may notice a change in fetal movement. If you notice a decrease in fetal movement, make sure to talk with your doctor. The fingernails have grown long and will need to be cut soon after birth. Small breast buds are present on both sexes. The mother is supplying the fetus with antibodies that will help protect against disease. All organs are developed, with the lungs maturing all the way until the day of delivery. The fetus is about 19 - 21 inches in length and weighs anywhere from 6   lbs to 10 lbs.    Labor Instructions for Midwife Patients    When to call:  Both during and after office hours call [phone]. There is a Nurse Midwife available to take your calls and answer your questions 24 hours a day.     When to call:  Call anytime you have important concerns about you or your baby.     Call if:    You are having contractions at regular intervals about 5-6 minutes apart lasting 30-60 seconds and becoming increasingly more intense     You have an uncontrollable gush of fluid from your vagina or feel a pop and gush like your water has broken    You have HEAVY bleeding, like heavy period, blood running down your legs, or  soaking a pad.     Some bleeding after a pelvic exam, after intercourse, or in labor when your cervix is dilating is normal and is referred to as \"bloody show\"    You have severe, continuous back or abdominal pain    You feel it is time to go to the hospital    If this is your first labor, call when contractions are very intense and have been about every 3-4 minutes for about an hour    If it is your second labor or more, call when contractions are strong and about every 3-5 minutes or sooner depending on your level of discomfort.     Keep in mind we are always here for you! If you have questions, " concerns please don't hesitate to call us.     What to eat/drink in labor: Drink plenty of fluid (water most importantly, juice, soda or tea without caffeine). Eat rice, pasta, soup, cereal, bread/toast, and fruit. Avoid dairy and greasy food as they are difficult to digest and you may experience some nausea during labor.    Comfort measures:    Baths and showers (ok even with ruptured membranes, it may temporarily slow contractions if you are still in the early stage of labor)    Warm/hot packs for back pain or discomfort    Back, belly, or thigh massages    Standing, rocking, walking, leaning over bed or tables, side-lying and sleeping    Miscellaneous:     Contractions are timed from the beginning of one to the beginning of the next    Try hard to sleep during the early stage of labor when you are not that uncomfortable. Timing of contractions at this point is not important    Even if you cannot sleep, resting in bed or on the couch can help you maintain your energy for labor    When you arrive at the hospital the nurse will check your baby's heartbeat, check your cervix, and will call us. The midwife on call will come in and be with you when you are in active labor    After hours you need to enter the hospital through the emergency room

## 2019-11-06 NOTE — PROGRESS NOTES
S: Feels generally well but is experiencing increased swelling in bilateral legs.  Baby active.  Denies uterine cramping, vaginal bleeding or leaking of fluid. No headache, increase in edema, no epigastric pain.   O: Vitals: /80 (BP Location: Left arm, Patient Position: Chair, Cuff Size: Adult Regular)   Wt 96.6 kg (212 lb 14.4 oz)   LMP 02/18/2019 (Exact Date)   BMI 37.71 kg/m    BMI= Body mass index is 37.71 kg/m .  Exam:  Constitutional: healthy, alert and no distress  Respiratory: respirations even and unlabored  Gastrointestinal: Abdomen soft, non-tender. Fundus measures appropriate for gestational age. Fetal heart tones hear without difficulty and within normal limits  : Deferred  Psychiatric: mentation appears normal and affect normal/bright  A:     ICD-10-CM    1. Encounter for supervision of normal first pregnancy in third trimester Z34.03        P: Labor signs and symptoms discussed, aware of numbers to call. Encouraged patient to wear compression socks during the day and elevate her feet when sitting.   Discussed warning signs of PIH/preeclampsia and patient will monitor.  GBS screen completed. Discussed plans for labor.   Encouraged patient to call with any questions or concerns. Growth US scheduled 11/19.  Return to clinic 1 weeks    SHRUTHI Bronson, EUFEMIA

## 2019-11-12 ENCOUNTER — PRENATAL OFFICE VISIT (OUTPATIENT)
Dept: OBGYN | Facility: CLINIC | Age: 29
End: 2019-11-12
Payer: COMMERCIAL

## 2019-11-12 VITALS — WEIGHT: 216 LBS | BODY MASS INDEX: 38.26 KG/M2 | SYSTOLIC BLOOD PRESSURE: 134 MMHG | DIASTOLIC BLOOD PRESSURE: 84 MMHG

## 2019-11-12 DIAGNOSIS — Z34.03 ENCOUNTER FOR SUPERVISION OF NORMAL FIRST PREGNANCY IN THIRD TRIMESTER: Primary | ICD-10-CM

## 2019-11-12 PROCEDURE — 99207 ZZC PRENATAL VISIT: CPT | Performed by: ADVANCED PRACTICE MIDWIFE

## 2019-11-12 NOTE — PROGRESS NOTES
S: Feels ready to have this baby.  Baby active.  Denies uterine cramping, vaginal bleeding or leaking of fluid. No headache, increase in edema, no epigastric pain.   O: Vitals: /84 (BP Location: Right arm, Cuff Size: Adult Regular)   Wt 98 kg (216 lb)   LMP 2019 (Exact Date)   BMI 38.26 kg/m    BMI= Body mass index is 38.26 kg/m .  Exam:  Constitutional: healthy, alert and no distress  Respiratory: respirations even and unlabored  Gastrointestinal: Abdomen soft, non-tender. Fundus measures appropriate for gestational age. Fetal heart tones hear without difficulty and within normal limits  : Normal external genitalia without lesions, cervix FT/50%/-1, membranes intact  Psychiatric: mentation appears normal and affect normal/bright  A:     ICD-10-CM    1. Encounter for supervision of normal first pregnancy in third trimester Z34.03      P: Growth ultrasound next week with MFM. Discussed possibility of  recommendation based on fetal weight.  Facilitated appointment with MD to discuss further option of elective primary .  Labor signs and symptoms discussed, aware of numbers to call  Discussed warning signs of PIH/preeclampsia and patient will monitor.  GBS screen completed. Discussed plans for labor.   Encouraged patient to call with any questions or concerns.  Return to clinic 1 weeks    SHRUTHI Bronson, EUFEMIA

## 2019-11-12 NOTE — NURSING NOTE
"Chief Complaint   Patient presents with     Prenatal Care     38w 1d       Initial /84 (BP Location: Right arm, Cuff Size: Adult Regular)   Wt 98 kg (216 lb)   LMP 2019 (Exact Date)   BMI 38.26 kg/m   Estimated body mass index is 38.26 kg/m  as calculated from the following:    Height as of 19: 1.6 m (5' 3\").    Weight as of this encounter: 98 kg (216 lb).  BP completed using cuff size: regular    Questioned patient about current smoking habits.  Pt. has never smoked.          The following HM Due: NONE  Gaby Story CMA    "

## 2019-11-16 ENCOUNTER — HOSPITAL ENCOUNTER (INPATIENT)
Facility: CLINIC | Age: 29
LOS: 4 days | Discharge: HOME | End: 2019-11-20
Attending: ADVANCED PRACTICE MIDWIFE | Admitting: ADVANCED PRACTICE MIDWIFE
Payer: COMMERCIAL

## 2019-11-16 DIAGNOSIS — D62 POSTOPERATIVE ANEMIA DUE TO ACUTE BLOOD LOSS: Primary | ICD-10-CM

## 2019-11-16 PROCEDURE — 12000000 ZZH R&B MED SURG/OB

## 2019-11-16 RX ORDER — OXYTOCIN/0.9 % SODIUM CHLORIDE 30/500 ML
100-340 PLASTIC BAG, INJECTION (ML) INTRAVENOUS CONTINUOUS PRN
Status: COMPLETED | OUTPATIENT
Start: 2019-11-16 | End: 2019-11-17

## 2019-11-16 RX ORDER — CARBOPROST TROMETHAMINE 250 UG/ML
250 INJECTION, SOLUTION INTRAMUSCULAR
Status: DISCONTINUED | OUTPATIENT
Start: 2019-11-16 | End: 2019-11-18 | Stop reason: CLARIF

## 2019-11-16 RX ORDER — SODIUM CHLORIDE, SODIUM LACTATE, POTASSIUM CHLORIDE, CALCIUM CHLORIDE 600; 310; 30; 20 MG/100ML; MG/100ML; MG/100ML; MG/100ML
INJECTION, SOLUTION INTRAVENOUS CONTINUOUS
Status: DISCONTINUED | OUTPATIENT
Start: 2019-11-16 | End: 2019-11-18 | Stop reason: CLARIF

## 2019-11-16 RX ORDER — FENTANYL CITRATE 50 UG/ML
50-100 INJECTION, SOLUTION INTRAMUSCULAR; INTRAVENOUS
Status: DISCONTINUED | OUTPATIENT
Start: 2019-11-16 | End: 2019-11-18 | Stop reason: CLARIF

## 2019-11-16 RX ORDER — ACETAMINOPHEN 325 MG/1
650 TABLET ORAL EVERY 4 HOURS PRN
Status: DISCONTINUED | OUTPATIENT
Start: 2019-11-16 | End: 2019-11-18 | Stop reason: CLARIF

## 2019-11-16 RX ORDER — OXYCODONE AND ACETAMINOPHEN 5; 325 MG/1; MG/1
1 TABLET ORAL
Status: DISCONTINUED | OUTPATIENT
Start: 2019-11-16 | End: 2019-11-17

## 2019-11-16 RX ORDER — ONDANSETRON 2 MG/ML
4 INJECTION INTRAMUSCULAR; INTRAVENOUS EVERY 6 HOURS PRN
Status: DISCONTINUED | OUTPATIENT
Start: 2019-11-16 | End: 2019-11-18 | Stop reason: CLARIF

## 2019-11-16 RX ORDER — IBUPROFEN 800 MG/1
800 TABLET, FILM COATED ORAL
Status: DISCONTINUED | OUTPATIENT
Start: 2019-11-16 | End: 2019-11-17

## 2019-11-16 RX ORDER — NALOXONE HYDROCHLORIDE 0.4 MG/ML
.1-.4 INJECTION, SOLUTION INTRAMUSCULAR; INTRAVENOUS; SUBCUTANEOUS
Status: DISCONTINUED | OUTPATIENT
Start: 2019-11-16 | End: 2019-11-18 | Stop reason: CLARIF

## 2019-11-16 RX ORDER — OXYTOCIN 10 [USP'U]/ML
10 INJECTION, SOLUTION INTRAMUSCULAR; INTRAVENOUS
Status: DISCONTINUED | OUTPATIENT
Start: 2019-11-16 | End: 2019-11-18 | Stop reason: CLARIF

## 2019-11-16 RX ORDER — METHYLERGONOVINE MALEATE 0.2 MG/ML
200 INJECTION INTRAVENOUS
Status: DISCONTINUED | OUTPATIENT
Start: 2019-11-16 | End: 2019-11-18 | Stop reason: CLARIF

## 2019-11-16 ASSESSMENT — MIFFLIN-ST. JEOR: SCORE: 1674.36

## 2019-11-17 ENCOUNTER — ANESTHESIA EVENT (OUTPATIENT)
Dept: SURGERY | Facility: CLINIC | Age: 29
End: 2019-11-17
Payer: COMMERCIAL

## 2019-11-17 ENCOUNTER — ANESTHESIA (OUTPATIENT)
Dept: SURGERY | Facility: CLINIC | Age: 29
End: 2019-11-17
Payer: COMMERCIAL

## 2019-11-17 ENCOUNTER — APPOINTMENT (OUTPATIENT)
Dept: ULTRASOUND IMAGING | Facility: CLINIC | Age: 29
End: 2019-11-17
Attending: ADVANCED PRACTICE MIDWIFE
Payer: COMMERCIAL

## 2019-11-17 ENCOUNTER — ANESTHESIA EVENT (OUTPATIENT)
Dept: OBGYN | Facility: CLINIC | Age: 29
End: 2019-11-17
Payer: COMMERCIAL

## 2019-11-17 ENCOUNTER — ANESTHESIA (OUTPATIENT)
Dept: OBGYN | Facility: CLINIC | Age: 29
End: 2019-11-17
Payer: COMMERCIAL

## 2019-11-17 LAB
ABO + RH BLD: NORMAL
ABO + RH BLD: NORMAL
ALT SERPL W P-5'-P-CCNC: 15 U/L (ref 0–50)
AST SERPL W P-5'-P-CCNC: 16 U/L (ref 0–45)
BASOPHILS # BLD AUTO: 0 10E9/L (ref 0–0.2)
BASOPHILS NFR BLD AUTO: 0.2 %
BLD GP AB SCN SERPL QL: NORMAL
BLOOD BANK CMNT PATIENT-IMP: NORMAL
CREAT SERPL-MCNC: 0.8 MG/DL (ref 0.52–1.04)
CREAT UR-MCNC: 66 MG/DL
DIFFERENTIAL METHOD BLD: ABNORMAL
EOSINOPHIL # BLD AUTO: 0.1 10E9/L (ref 0–0.7)
EOSINOPHIL NFR BLD AUTO: 1.4 %
ERYTHROCYTE [DISTWIDTH] IN BLOOD BY AUTOMATED COUNT: 13.2 % (ref 10–15)
GFR SERPL CREATININE-BSD FRML MDRD: >90 ML/MIN/{1.73_M2}
HCT VFR BLD AUTO: 32.2 % (ref 35–47)
HGB BLD-MCNC: 10.4 G/DL (ref 11.7–15.7)
IMM GRANULOCYTES # BLD: 0.1 10E9/L (ref 0–0.4)
IMM GRANULOCYTES NFR BLD: 0.7 %
LYMPHOCYTES # BLD AUTO: 1.3 10E9/L (ref 0.8–5.3)
LYMPHOCYTES NFR BLD AUTO: 15.2 %
MCH RBC QN AUTO: 27.5 PG (ref 26.5–33)
MCHC RBC AUTO-ENTMCNC: 32.3 G/DL (ref 31.5–36.5)
MCV RBC AUTO: 85 FL (ref 78–100)
MONOCYTES # BLD AUTO: 0.7 10E9/L (ref 0–1.3)
MONOCYTES NFR BLD AUTO: 8.1 %
NEUTROPHILS # BLD AUTO: 6.2 10E9/L (ref 1.6–8.3)
NEUTROPHILS NFR BLD AUTO: 74.4 %
NRBC # BLD AUTO: 0 10*3/UL
NRBC BLD AUTO-RTO: 0 /100
PLATELET # BLD AUTO: 215 10E9/L (ref 150–450)
PROT UR-MCNC: 0.09 G/L
PROT/CREAT 24H UR: 0.14 G/G CR (ref 0–0.2)
RBC # BLD AUTO: 3.78 10E12/L (ref 3.8–5.2)
SPECIMEN EXP DATE BLD: NORMAL
T PALLIDUM AB SER QL: NONREACTIVE
WBC # BLD AUTO: 8.4 10E9/L (ref 4–11)

## 2019-11-17 PROCEDURE — 25000125 ZZHC RX 250: Performed by: ADVANCED PRACTICE MIDWIFE

## 2019-11-17 PROCEDURE — 86901 BLOOD TYPING SEROLOGIC RH(D): CPT | Performed by: ADVANCED PRACTICE MIDWIFE

## 2019-11-17 PROCEDURE — 36000056 ZZH SURGERY LEVEL 3 1ST 30 MIN: Performed by: OBSTETRICS & GYNECOLOGY

## 2019-11-17 PROCEDURE — 37000008 ZZH ANESTHESIA TECHNICAL FEE, 1ST 30 MIN: Performed by: OBSTETRICS & GYNECOLOGY

## 2019-11-17 PROCEDURE — 12000000 ZZH R&B MED SURG/OB

## 2019-11-17 PROCEDURE — 84460 ALANINE AMINO (ALT) (SGPT): CPT | Performed by: ADVANCED PRACTICE MIDWIFE

## 2019-11-17 PROCEDURE — 25800030 ZZH RX IP 258 OP 636: Performed by: OBSTETRICS & GYNECOLOGY

## 2019-11-17 PROCEDURE — 84156 ASSAY OF PROTEIN URINE: CPT | Performed by: ADVANCED PRACTICE MIDWIFE

## 2019-11-17 PROCEDURE — 25000125 ZZHC RX 250

## 2019-11-17 PROCEDURE — 82565 ASSAY OF CREATININE: CPT | Performed by: ADVANCED PRACTICE MIDWIFE

## 2019-11-17 PROCEDURE — 76805 OB US >/= 14 WKS SNGL FETUS: CPT

## 2019-11-17 PROCEDURE — 25800030 ZZH RX IP 258 OP 636: Performed by: ADVANCED PRACTICE MIDWIFE

## 2019-11-17 PROCEDURE — 36000058 ZZH SURGERY LEVEL 3 EA 15 ADDTL MIN: Performed by: OBSTETRICS & GYNECOLOGY

## 2019-11-17 PROCEDURE — 86850 RBC ANTIBODY SCREEN: CPT | Performed by: ADVANCED PRACTICE MIDWIFE

## 2019-11-17 PROCEDURE — 85025 COMPLETE CBC W/AUTO DIFF WBC: CPT | Performed by: ADVANCED PRACTICE MIDWIFE

## 2019-11-17 PROCEDURE — 37000011 ZZH ANESTHESIA WARD SERVICE

## 2019-11-17 PROCEDURE — 27210794 ZZH OR GENERAL SUPPLY STERILE: Performed by: OBSTETRICS & GYNECOLOGY

## 2019-11-17 PROCEDURE — 00HU33Z INSERTION OF INFUSION DEVICE INTO SPINAL CANAL, PERCUTANEOUS APPROACH: ICD-10-PCS | Performed by: ANESTHESIOLOGY

## 2019-11-17 PROCEDURE — 25000128 H RX IP 250 OP 636: Performed by: ANESTHESIOLOGY

## 2019-11-17 PROCEDURE — 25000128 H RX IP 250 OP 636

## 2019-11-17 PROCEDURE — 25000128 H RX IP 250 OP 636: Performed by: NURSE ANESTHETIST, CERTIFIED REGISTERED

## 2019-11-17 PROCEDURE — 3E0R3BZ INTRODUCTION OF ANESTHETIC AGENT INTO SPINAL CANAL, PERCUTANEOUS APPROACH: ICD-10-PCS | Performed by: ANESTHESIOLOGY

## 2019-11-17 PROCEDURE — 37000009 ZZH ANESTHESIA TECHNICAL FEE, EACH ADDTL 15 MIN: Performed by: OBSTETRICS & GYNECOLOGY

## 2019-11-17 PROCEDURE — 71000012 ZZH RECOVERY PHASE 1 LEVEL 1 FIRST HR: Performed by: OBSTETRICS & GYNECOLOGY

## 2019-11-17 PROCEDURE — 25000128 H RX IP 250 OP 636: Performed by: OBSTETRICS & GYNECOLOGY

## 2019-11-17 PROCEDURE — 40000671 ZZH STATISTIC ANESTHESIA CASE

## 2019-11-17 PROCEDURE — 59510 CESAREAN DELIVERY: CPT | Performed by: OBSTETRICS & GYNECOLOGY

## 2019-11-17 PROCEDURE — 25000132 ZZH RX MED GY IP 250 OP 250 PS 637: Performed by: OBSTETRICS & GYNECOLOGY

## 2019-11-17 PROCEDURE — 86780 TREPONEMA PALLIDUM: CPT | Performed by: ADVANCED PRACTICE MIDWIFE

## 2019-11-17 PROCEDURE — 25000125 ZZHC RX 250: Performed by: ANESTHESIOLOGY

## 2019-11-17 PROCEDURE — 84450 TRANSFERASE (AST) (SGOT): CPT | Performed by: ADVANCED PRACTICE MIDWIFE

## 2019-11-17 PROCEDURE — 86900 BLOOD TYPING SEROLOGIC ABO: CPT | Performed by: ADVANCED PRACTICE MIDWIFE

## 2019-11-17 RX ORDER — SODIUM CHLORIDE, SODIUM LACTATE, POTASSIUM CHLORIDE, CALCIUM CHLORIDE 600; 310; 30; 20 MG/100ML; MG/100ML; MG/100ML; MG/100ML
INJECTION, SOLUTION INTRAVENOUS CONTINUOUS
Status: DISCONTINUED | OUTPATIENT
Start: 2019-11-17 | End: 2019-11-17 | Stop reason: HOSPADM

## 2019-11-17 RX ORDER — MEPERIDINE HYDROCHLORIDE 50 MG/ML
12.5 INJECTION INTRAMUSCULAR; INTRAVENOUS; SUBCUTANEOUS EVERY 5 MIN PRN
Status: DISCONTINUED | OUTPATIENT
Start: 2019-11-17 | End: 2019-11-17 | Stop reason: HOSPADM

## 2019-11-17 RX ORDER — MORPHINE SULFATE 1 MG/ML
INJECTION, SOLUTION EPIDURAL; INTRATHECAL; INTRAVENOUS PRN
Status: DISCONTINUED | OUTPATIENT
Start: 2019-11-17 | End: 2019-11-17

## 2019-11-17 RX ORDER — ACETAMINOPHEN 325 MG/1
650 TABLET ORAL EVERY 4 HOURS PRN
Status: DISCONTINUED | OUTPATIENT
Start: 2019-11-20 | End: 2019-11-20 | Stop reason: HOSPADM

## 2019-11-17 RX ORDER — DIMENHYDRINATE 50 MG/ML
25 INJECTION, SOLUTION INTRAMUSCULAR; INTRAVENOUS
Status: DISCONTINUED | OUTPATIENT
Start: 2019-11-17 | End: 2019-11-17 | Stop reason: HOSPADM

## 2019-11-17 RX ORDER — HYDROMORPHONE HYDROCHLORIDE 1 MG/ML
.3-.5 INJECTION, SOLUTION INTRAMUSCULAR; INTRAVENOUS; SUBCUTANEOUS EVERY 5 MIN PRN
Status: DISCONTINUED | OUTPATIENT
Start: 2019-11-17 | End: 2019-11-17 | Stop reason: HOSPADM

## 2019-11-17 RX ORDER — METOCLOPRAMIDE HYDROCHLORIDE 5 MG/ML
10 INJECTION INTRAMUSCULAR; INTRAVENOUS EVERY 6 HOURS PRN
Status: DISCONTINUED | OUTPATIENT
Start: 2019-11-17 | End: 2019-11-18 | Stop reason: CLARIF

## 2019-11-17 RX ORDER — HYDRALAZINE HYDROCHLORIDE 20 MG/ML
2.5-5 INJECTION INTRAMUSCULAR; INTRAVENOUS EVERY 10 MIN PRN
Status: DISCONTINUED | OUTPATIENT
Start: 2019-11-17 | End: 2019-11-17 | Stop reason: HOSPADM

## 2019-11-17 RX ORDER — HYDROCORTISONE 2.5 %
CREAM (GRAM) TOPICAL 3 TIMES DAILY PRN
Status: DISCONTINUED | OUTPATIENT
Start: 2019-11-17 | End: 2019-11-20 | Stop reason: HOSPADM

## 2019-11-17 RX ORDER — NALBUPHINE HYDROCHLORIDE 10 MG/ML
5 INJECTION, SOLUTION INTRAMUSCULAR; INTRAVENOUS; SUBCUTANEOUS
Status: DISCONTINUED | OUTPATIENT
Start: 2019-11-17 | End: 2019-11-20 | Stop reason: HOSPADM

## 2019-11-17 RX ORDER — DIPHENHYDRAMINE HYDROCHLORIDE 50 MG/ML
25 INJECTION INTRAMUSCULAR; INTRAVENOUS EVERY 6 HOURS PRN
Status: DISCONTINUED | OUTPATIENT
Start: 2019-11-17 | End: 2019-11-20 | Stop reason: HOSPADM

## 2019-11-17 RX ORDER — NALOXONE HYDROCHLORIDE 0.4 MG/ML
.1-.4 INJECTION, SOLUTION INTRAMUSCULAR; INTRAVENOUS; SUBCUTANEOUS
Status: DISCONTINUED | OUTPATIENT
Start: 2019-11-17 | End: 2019-11-18 | Stop reason: CLARIF

## 2019-11-17 RX ORDER — ACETAMINOPHEN 325 MG/1
975 TABLET ORAL EVERY 8 HOURS
Status: COMPLETED | OUTPATIENT
Start: 2019-11-17 | End: 2019-11-20

## 2019-11-17 RX ORDER — CEFAZOLIN SODIUM 2 G/100ML
2 INJECTION, SOLUTION INTRAVENOUS
Status: COMPLETED | OUTPATIENT
Start: 2019-11-17 | End: 2019-11-17

## 2019-11-17 RX ORDER — DEXAMETHASONE SODIUM PHOSPHATE 4 MG/ML
4 INJECTION, SOLUTION INTRA-ARTICULAR; INTRALESIONAL; INTRAMUSCULAR; INTRAVENOUS; SOFT TISSUE
Status: DISCONTINUED | OUTPATIENT
Start: 2019-11-17 | End: 2019-11-17 | Stop reason: HOSPADM

## 2019-11-17 RX ORDER — AMOXICILLIN 250 MG
1 CAPSULE ORAL 2 TIMES DAILY PRN
Status: DISCONTINUED | OUTPATIENT
Start: 2019-11-17 | End: 2019-11-20 | Stop reason: HOSPADM

## 2019-11-17 RX ORDER — ONDANSETRON 2 MG/ML
4 INJECTION INTRAMUSCULAR; INTRAVENOUS EVERY 6 HOURS PRN
Status: DISCONTINUED | OUTPATIENT
Start: 2019-11-17 | End: 2019-11-17 | Stop reason: HOSPADM

## 2019-11-17 RX ORDER — AMOXICILLIN 250 MG
2 CAPSULE ORAL 2 TIMES DAILY PRN
Status: DISCONTINUED | OUTPATIENT
Start: 2019-11-17 | End: 2019-11-20 | Stop reason: HOSPADM

## 2019-11-17 RX ORDER — LANOLIN 100 %
OINTMENT (GRAM) TOPICAL
Status: DISCONTINUED | OUTPATIENT
Start: 2019-11-17 | End: 2019-11-20 | Stop reason: HOSPADM

## 2019-11-17 RX ORDER — DEXTROSE, SODIUM CHLORIDE, SODIUM LACTATE, POTASSIUM CHLORIDE, AND CALCIUM CHLORIDE 5; .6; .31; .03; .02 G/100ML; G/100ML; G/100ML; G/100ML; G/100ML
INJECTION, SOLUTION INTRAVENOUS CONTINUOUS
Status: DISCONTINUED | OUTPATIENT
Start: 2019-11-17 | End: 2019-11-20 | Stop reason: HOSPADM

## 2019-11-17 RX ORDER — LIDOCAINE 40 MG/G
CREAM TOPICAL
Status: DISCONTINUED | OUTPATIENT
Start: 2019-11-17 | End: 2019-11-18 | Stop reason: CLARIF

## 2019-11-17 RX ORDER — NALOXONE HYDROCHLORIDE 0.4 MG/ML
.1-.4 INJECTION, SOLUTION INTRAMUSCULAR; INTRAVENOUS; SUBCUTANEOUS
Status: DISCONTINUED | OUTPATIENT
Start: 2019-11-17 | End: 2019-11-20 | Stop reason: HOSPADM

## 2019-11-17 RX ORDER — NALBUPHINE HYDROCHLORIDE 10 MG/ML
2.5-5 INJECTION, SOLUTION INTRAMUSCULAR; INTRAVENOUS; SUBCUTANEOUS EVERY 6 HOURS PRN
Status: DISCONTINUED | OUTPATIENT
Start: 2019-11-17 | End: 2019-11-17 | Stop reason: HOSPADM

## 2019-11-17 RX ORDER — METOCLOPRAMIDE 10 MG/1
10 TABLET ORAL EVERY 6 HOURS PRN
Status: DISCONTINUED | OUTPATIENT
Start: 2019-11-17 | End: 2019-11-18 | Stop reason: CLARIF

## 2019-11-17 RX ORDER — ONDANSETRON 2 MG/ML
4 INJECTION INTRAMUSCULAR; INTRAVENOUS EVERY 6 HOURS PRN
Status: DISCONTINUED | OUTPATIENT
Start: 2019-11-17 | End: 2019-11-17

## 2019-11-17 RX ORDER — OXYTOCIN/0.9 % SODIUM CHLORIDE 30/500 ML
100 PLASTIC BAG, INJECTION (ML) INTRAVENOUS CONTINUOUS
Status: DISCONTINUED | OUTPATIENT
Start: 2019-11-17 | End: 2019-11-20 | Stop reason: HOSPADM

## 2019-11-17 RX ORDER — LIDOCAINE 40 MG/G
CREAM TOPICAL
Status: DISCONTINUED | OUTPATIENT
Start: 2019-11-17 | End: 2019-11-20 | Stop reason: HOSPADM

## 2019-11-17 RX ORDER — FENTANYL CITRATE 50 UG/ML
25-50 INJECTION, SOLUTION INTRAMUSCULAR; INTRAVENOUS
Status: DISCONTINUED | OUTPATIENT
Start: 2019-11-17 | End: 2019-11-17 | Stop reason: HOSPADM

## 2019-11-17 RX ORDER — LABETALOL 20 MG/4 ML (5 MG/ML) INTRAVENOUS SYRINGE
10
Status: DISCONTINUED | OUTPATIENT
Start: 2019-11-17 | End: 2019-11-17 | Stop reason: HOSPADM

## 2019-11-17 RX ORDER — OXYCODONE HYDROCHLORIDE 5 MG/1
5-10 TABLET ORAL
Status: DISCONTINUED | OUTPATIENT
Start: 2019-11-17 | End: 2019-11-20 | Stop reason: HOSPADM

## 2019-11-17 RX ORDER — FENTANYL/BUPIVACAINE/NS/PF 2-1250MCG
PLASTIC BAG, INJECTION (ML) INJECTION
Status: COMPLETED
Start: 2019-11-17 | End: 2019-11-17

## 2019-11-17 RX ORDER — EPHEDRINE SULFATE 50 MG/ML
5 INJECTION, SOLUTION INTRAMUSCULAR; INTRAVENOUS; SUBCUTANEOUS
Status: DISCONTINUED | OUTPATIENT
Start: 2019-11-17 | End: 2019-11-17 | Stop reason: HOSPADM

## 2019-11-17 RX ORDER — KETOROLAC TROMETHAMINE 30 MG/ML
30 INJECTION, SOLUTION INTRAMUSCULAR; INTRAVENOUS
Status: DISCONTINUED | OUTPATIENT
Start: 2019-11-17 | End: 2019-11-17

## 2019-11-17 RX ORDER — ONDANSETRON 4 MG/1
4 TABLET, ORALLY DISINTEGRATING ORAL EVERY 6 HOURS PRN
Status: DISCONTINUED | OUTPATIENT
Start: 2019-11-17 | End: 2019-11-17 | Stop reason: HOSPADM

## 2019-11-17 RX ORDER — CITRIC ACID/SODIUM CITRATE 334-500MG
30 SOLUTION, ORAL ORAL
Status: COMPLETED | OUTPATIENT
Start: 2019-11-17 | End: 2019-11-17

## 2019-11-17 RX ORDER — CEFAZOLIN SODIUM 1 G/3ML
1 INJECTION, POWDER, FOR SOLUTION INTRAMUSCULAR; INTRAVENOUS SEE ADMIN INSTRUCTIONS
Status: DISCONTINUED | OUTPATIENT
Start: 2019-11-17 | End: 2019-11-17 | Stop reason: HOSPADM

## 2019-11-17 RX ORDER — DEXAMETHASONE SODIUM PHOSPHATE 4 MG/ML
INJECTION, SOLUTION INTRA-ARTICULAR; INTRALESIONAL; INTRAMUSCULAR; INTRAVENOUS; SOFT TISSUE PRN
Status: DISCONTINUED | OUTPATIENT
Start: 2019-11-17 | End: 2019-11-17

## 2019-11-17 RX ORDER — NALOXONE HYDROCHLORIDE 0.4 MG/ML
.1-.4 INJECTION, SOLUTION INTRAMUSCULAR; INTRAVENOUS; SUBCUTANEOUS
Status: DISCONTINUED | OUTPATIENT
Start: 2019-11-17 | End: 2019-11-17 | Stop reason: HOSPADM

## 2019-11-17 RX ORDER — ONDANSETRON 2 MG/ML
4 INJECTION INTRAMUSCULAR; INTRAVENOUS EVERY 30 MIN PRN
Status: DISCONTINUED | OUTPATIENT
Start: 2019-11-17 | End: 2019-11-17 | Stop reason: HOSPADM

## 2019-11-17 RX ORDER — ONDANSETRON 4 MG/1
4 TABLET, ORALLY DISINTEGRATING ORAL EVERY 30 MIN PRN
Status: DISCONTINUED | OUTPATIENT
Start: 2019-11-17 | End: 2019-11-17 | Stop reason: HOSPADM

## 2019-11-17 RX ORDER — EPHEDRINE SULFATE 50 MG/ML
INJECTION, SOLUTION INTRAMUSCULAR; INTRAVENOUS; SUBCUTANEOUS
Status: COMPLETED
Start: 2019-11-17 | End: 2019-11-17

## 2019-11-17 RX ORDER — BISACODYL 10 MG
10 SUPPOSITORY, RECTAL RECTAL DAILY PRN
Status: DISCONTINUED | OUTPATIENT
Start: 2019-11-19 | End: 2019-11-20 | Stop reason: HOSPADM

## 2019-11-17 RX ORDER — IBUPROFEN 800 MG/1
800 TABLET, FILM COATED ORAL EVERY 6 HOURS PRN
Status: DISCONTINUED | OUTPATIENT
Start: 2019-11-18 | End: 2019-11-20 | Stop reason: HOSPADM

## 2019-11-17 RX ORDER — SIMETHICONE 80 MG
80 TABLET,CHEWABLE ORAL 4 TIMES DAILY PRN
Status: DISCONTINUED | OUTPATIENT
Start: 2019-11-17 | End: 2019-11-20 | Stop reason: HOSPADM

## 2019-11-17 RX ORDER — OXYTOCIN/0.9 % SODIUM CHLORIDE 30/500 ML
340 PLASTIC BAG, INJECTION (ML) INTRAVENOUS CONTINUOUS PRN
Status: DISCONTINUED | OUTPATIENT
Start: 2019-11-17 | End: 2019-11-20 | Stop reason: HOSPADM

## 2019-11-17 RX ORDER — OXYTOCIN/0.9 % SODIUM CHLORIDE 30/500 ML
1-24 PLASTIC BAG, INJECTION (ML) INTRAVENOUS CONTINUOUS
Status: DISCONTINUED | OUTPATIENT
Start: 2019-11-17 | End: 2019-11-18 | Stop reason: CLARIF

## 2019-11-17 RX ORDER — KETOROLAC TROMETHAMINE 30 MG/ML
30 INJECTION, SOLUTION INTRAMUSCULAR; INTRAVENOUS EVERY 6 HOURS
Status: COMPLETED | OUTPATIENT
Start: 2019-11-17 | End: 2019-11-18

## 2019-11-17 RX ORDER — OXYTOCIN 10 [USP'U]/ML
10 INJECTION, SOLUTION INTRAMUSCULAR; INTRAVENOUS
Status: DISCONTINUED | OUTPATIENT
Start: 2019-11-17 | End: 2019-11-20 | Stop reason: HOSPADM

## 2019-11-17 RX ADMIN — SODIUM CITRATE AND CITRIC ACID MONOHYDRATE 30 ML: 500; 334 SOLUTION ORAL at 15:44

## 2019-11-17 RX ADMIN — EPHEDRINE SULFATE 5 MG: 50 INJECTION, SOLUTION INTRAVENOUS at 05:13

## 2019-11-17 RX ADMIN — Medication 340 ML/HR: at 18:09

## 2019-11-17 RX ADMIN — DEXAMETHASONE SODIUM PHOSPHATE 4 MG: 4 INJECTION, SOLUTION INTRA-ARTICULAR; INTRALESIONAL; INTRAMUSCULAR; INTRAVENOUS; SOFT TISSUE at 16:42

## 2019-11-17 RX ADMIN — SODIUM CHLORIDE, POTASSIUM CHLORIDE, SODIUM LACTATE AND CALCIUM CHLORIDE 1000 ML: 600; 310; 30; 20 INJECTION, SOLUTION INTRAVENOUS at 15:28

## 2019-11-17 RX ADMIN — ACETAMINOPHEN 975 MG: 325 TABLET, FILM COATED ORAL at 19:47

## 2019-11-17 RX ADMIN — Medication: at 04:20

## 2019-11-17 RX ADMIN — EPHEDRINE SULFATE 5 MG: 50 INJECTION, SOLUTION INTRAVENOUS at 05:35

## 2019-11-17 RX ADMIN — ONDANSETRON 4 MG: 2 INJECTION INTRAMUSCULAR; INTRAVENOUS at 16:19

## 2019-11-17 RX ADMIN — SODIUM CHLORIDE, POTASSIUM CHLORIDE, SODIUM LACTATE AND CALCIUM CHLORIDE 1000 ML: 600; 310; 30; 20 INJECTION, SOLUTION INTRAVENOUS at 03:35

## 2019-11-17 RX ADMIN — SODIUM CHLORIDE, POTASSIUM CHLORIDE, SODIUM LACTATE AND CALCIUM CHLORIDE: 600; 310; 30; 20 INJECTION, SOLUTION INTRAVENOUS at 16:06

## 2019-11-17 RX ADMIN — CEFAZOLIN SODIUM 2 G: 2 INJECTION, SOLUTION INTRAVENOUS at 16:06

## 2019-11-17 RX ADMIN — SENNOSIDES AND DOCUSATE SODIUM 1 TABLET: 8.6; 5 TABLET ORAL at 22:43

## 2019-11-17 RX ADMIN — EPHEDRINE SULFATE 5 MG: 50 INJECTION, SOLUTION INTRAVENOUS at 05:53

## 2019-11-17 RX ADMIN — SODIUM CHLORIDE, POTASSIUM CHLORIDE, SODIUM LACTATE AND CALCIUM CHLORIDE: 600; 310; 30; 20 INJECTION, SOLUTION INTRAVENOUS at 09:34

## 2019-11-17 RX ADMIN — Medication 2 MILLI-UNITS/MIN: at 05:48

## 2019-11-17 RX ADMIN — Medication: at 11:58

## 2019-11-17 RX ADMIN — KETOROLAC TROMETHAMINE 30 MG: 30 INJECTION, SOLUTION INTRAMUSCULAR at 22:41

## 2019-11-17 RX ADMIN — AZITHROMYCIN MONOHYDRATE 500 MG: 500 INJECTION, POWDER, LYOPHILIZED, FOR SOLUTION INTRAVENOUS at 15:40

## 2019-11-17 RX ADMIN — SODIUM CHLORIDE, POTASSIUM CHLORIDE, SODIUM LACTATE AND CALCIUM CHLORIDE: 600; 310; 30; 20 INJECTION, SOLUTION INTRAVENOUS at 05:14

## 2019-11-17 RX ADMIN — NALBUPHINE HYDROCHLORIDE 5 MG: 10 INJECTION, SOLUTION INTRAMUSCULAR; INTRAVENOUS; SUBCUTANEOUS at 21:27

## 2019-11-17 RX ADMIN — SODIUM CHLORIDE, SODIUM LACTATE, POTASSIUM CHLORIDE, CALCIUM CHLORIDE AND DEXTROSE MONOHYDRATE: 5; 600; 310; 30; 20 INJECTION, SOLUTION INTRAVENOUS at 23:39

## 2019-11-17 RX ADMIN — MORPHINE SULFATE 3 MG: 1 INJECTION, SOLUTION EPIDURAL; INTRATHECAL; INTRAVENOUS at 16:57

## 2019-11-17 NOTE — ANESTHESIA PROCEDURE NOTES
Peripheral nerve/Neuraxial procedure note : epidural catheter  Pre-Procedure  Performed by Ant Yadav MD  Location:     Pre-Anesthestic Checklist: patient identified, IV checked, risks and benefits discussed, informed consent, monitors and equipment checked, pre-op evaluation and at physician/surgeon's request    Timeout  Correct Patient: Yes   Correct Procedure: Yes   Correct Site: Yes   Correct Laterality: Yes   Correct Position: Yes   Site Marked: Yes   .   Procedure Documentation    .    Procedure: epidural catheter, .       .  .        Assessment/Narrative  .  .  . Comments:  Patient desires Labor Epidural for labor analgesia. Vaginal delivery anticipated.    Chart reviewed. Patient examined. No changes to pre procedure chart review. Risks including but not limited to bleeding, infection, nerve injury, PDPH, intrathecal injection, high block, incomplete block, one-sided block, back pain, and low blood pressure discussed in detail. Questions answered. Consent signed.    Pause for the Cause completed. NIBP and pulse ox functioning. L&D nurse present.    Procedure: Sitting. Unable to bend forward.Betadine prep x 3. Sterile drape applied.  Lidocaine 1% x 2 cc local infiltration at L 2-3.  17 G. Tu needle ML ARNULFO 3 attempts. Encountered bone with first two attempts.  No CSF, paresthesia or blood. 20 g. Epidural catheter inserted w/o resistance 5 cm.  Negative aspiration for CSF and blood. Filter in line.  Test dose Lidocaine 1.5% w/ 1:200,000 epi x 3 cc injected. Negative for neuro change or symptoms of intravascular injection.  Bolus dose: Marcaine 0.25% 5cc x 2 doses (10 cc total).  Infusion orders written.    I or my partner am immediately available. I or my partner will monitor the patient and supervise nursing care at necessary intervals.    Nam

## 2019-11-17 NOTE — ANESTHESIA POSTPROCEDURE EVALUATION
Patient: Aliya Yip    Procedure(s):   SECTION    Diagnosis:* No pre-op diagnosis entered *  Diagnosis Additional Information: No value filed.    Anesthesia Type:  Epidural    Note:  Anesthesia Post Evaluation    Patient location during evaluation: PACU  Patient participation: Able to fully participate in evaluation  Level of consciousness: awake and alert  Pain management: adequate  Airway patency: patent  Cardiovascular status: acceptable  Respiratory status: acceptable  Hydration status: acceptable  PONV: controlled     Anesthetic complications: None          Last vitals:  Vitals:    19 1437 19 1530 19 1715   BP: 131/77     Pulse:      Resp:  18 (P) 16   Temp:  98.5  F (36.9  C) (P) 98.4  F (36.9  C)   SpO2:            Electronically Signed By: Zach Hernandez MD  2019  5:35 PM

## 2019-11-17 NOTE — ANESTHESIA CARE TRANSFER NOTE
Patient: Aliya Yip    Procedure(s):   SECTION    Diagnosis: * No pre-op diagnosis entered *  Diagnosis Additional Information: No value filed.    Anesthesia Type:   Epidural     Note:  Airway :Room Air  Patient transferred to:PACU  Handoff Report: Identifed the Patient, Identified the Reponsible Provider, Reviewed the pertinent medical history, Discussed the surgical course, Reviewed Intra-OP anesthesia mangement and issues during anesthesia, Set expectations for post-procedure period and Allowed opportunity for questions and acknowledgement of understanding      Vitals: (Last set prior to Anesthesia Care Transfer)    CRNA VITALS  2019 1642 - 2019 1720      2019             Pulse:  117    SpO2:  100 %                Electronically Signed By: SHRUTHI Montana CRNA  2019  5:20 PM

## 2019-11-17 NOTE — CONSULTS
"  2019    Aliya Yip  [id number]            OB Consultation      I was asked to see this Pt in consultation by Marycarmen Hoyos CNM to  Pt about possible elective primary  section.      Patient presented to L&D for gross SROM at 9:45PM tonight, clear fluid.  She has no VB, rare mild UC's.  Fetus active.  Pt was noted on [facility] U/S 10/29/19 at 36 wks - 3694 g (94%) w/ AC > 40wks, all c/w LGA.  [facility] felt given she does not have GDM that trial of labor would still be reasonable.  Pt had an U/S scheduled for 2 days from now.  Her primary CNM had arranged an OB consult for discussion of a elective primary C/S because Pt desired to review this as an option, however that visit had not yet happened so I was asked to come to consult regarding this option.  Of note, Pt last ate solid food at 8:00 PM.    BPs incidentally noted 142/92 -140/86 on admission, no HA, visual changes, RUQ pain.    Patient's last menstrual period was 2019 (exact date).   Her Estimated Date of Delivery: 2019, making her 38w6d.      Estimated body mass index is 38.09 kg/m  as calculated from the following:    Height as of this encounter: 1.6 m (5' 3\").    Weight as of this encounter: 97.5 kg (215 lb).  Her prenatal course has been w/ Ruba Hoyos CNM complicated by suspected LGA as noted above, velamentous cord insertion, Hx HSV on Valtrex.    See prenatal for labs.  Neg GBS, Rubella Immune, RH Positive         She is a 29 year old   Her OB history:   OB History    Para Term  AB Living   1 0 0 0 0 0   SAB TAB Ectopic Multiple Live Births   0 0 0 0 0      # Outcome Date GA Lbr Beto/2nd Weight Sex Delivery Anes PTL Lv   1 Current                     Past Medical History:   Diagnosis Date     Genital herpes      UTI (urinary tract infection)         History reviewed. No pertinent surgical history.      No current outpatient medications on file.       Allergies: Patient has no known allergies.      REVIEW " "OF SYSTEMS:  NEUROLOGIC:  Negative  EYES:  Negative  ENT:  Negative  GI:  Negative  :  Negative  GYN:  Negative  CV:  Negative  PULMONARY:  Negative  MUSCULOSKELETAL:  Negative  PSYCH:  Negative        Social History     Socioeconomic History     Marital status:      Spouse name: Not on file     Number of children: Not on file     Years of education: Not on file     Highest education level: Not on file   Occupational History     Occupation: lock desk analyst   Social Needs     Financial resource strain: Not on file     Food insecurity:     Worry: Not on file     Inability: Not on file     Transportation needs:     Medical: Not on file     Non-medical: Not on file   Tobacco Use     Smoking status: Never Smoker     Smokeless tobacco: Never Used   Substance and Sexual Activity     Alcohol use: Not Currently     Frequency: Never     Drug use: Never     Sexual activity: Yes     Partners: Male   Lifestyle     Physical activity:     Days per week: Not on file     Minutes per session: Not on file     Stress: Not on file   Relationships     Social connections:     Talks on phone: Not on file     Gets together: Not on file     Attends Restorationism service: Not on file     Active member of club or organization: Not on file     Attends meetings of clubs or organizations: Not on file     Relationship status: Not on file     Intimate partner violence:     Fear of current or ex partner: Not on file     Emotionally abused: Not on file     Physically abused: Not on file     Forced sexual activity: Not on file   Other Topics Concern     Not on file   Social History Narrative     Not on file      Family History   Problem Relation Age of Onset     Diabetes Mother      Breast Cancer Mother      Hypertension Mother          Exam:    Vitals:   BP (!) 140/86   Temp 98  F (36.7  C) (Oral)   Resp 16   Ht 1.6 m (5' 3\")   Wt 97.5 kg (215 lb)   LMP 02/18/2019 (Exact Date)   BMI 38.09 kg/m    FHT: Reactive, category 1    Bagley:  Mild " contractions irregularly    Gen- Alert Awake in NAD  HEENT grossly normal  Neck: no lymphadenopathy or thryoidomegaly  Lungs CTAB  Back No CVAT  Heart RR  ABD gravid, NABS, soft, NT on exam with NT fundus palpable  Cervix is 1.5 cm / 80 % effaced at -3 station/ Soft / Anterior/ Vertex  EXT:  No edema, no calf tenderness    Clinical EFW 4200 g  Pelvis - slightly convergent sidewalls    Assessment:    1)  IUP at 38w6d  2)  SROM w/ favorable Cx - Bishops score 8  3)  LGA by U/S - MFM specifically stated if EFW was < 5000 g that trial of labor could be considered since Pt does not have GDM, my clinical EFW is somewhat less than this by Leopolds.  4)  BPs elevated in mild range, no PIH sx's.  5)  Velamentous cord insertion  6)  Hx HSV - on Valtrex, no prodrome or lesions  7)  GBS Neg      Plan:    1)  I spent most of today's encounter counseling Pt re: the options of trial of labor vs elective primary C/S, including ACOG's guidelines that do support elective C/S for reasons as simple as not wanting to labor, regardless of other clinical factors.  I reviewed the advantages of vaginal delivery if successful, as well as the possible outcomes if CPD and arrest of labor occurs and C/S is required, vs what she can expect if she opts for C/S outright in the absence of trial of labor.  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  She understands the indications/risks/benefits/alternatives of all options.  At this time, she is uncertain which way she wants to go.    2)  I advised Pt that since we need to wait at least until 4:00 AM to even do a C/S in the absence of labor or fetal indications, that she can be managed expectantly for now.  She can also even consider induction with Pitocin at some point overnight if she does opt to labor.  Pt desires expectant management for now while she decides  whether to attempt labor or have a C/S.    3)  In the interim, recommend U/S for EFW.    4)  Also recommend checking PIH labs.  If BPs stay persistently elevated, or labs abnormal, then would recommend beginning labor induction with Pitocin (in which Mg sulfate may need to be considered for GHTN) or would proceed w/ C/S outright, if that is her choice for delivery route. at that time.      Jc Sutton MD  November 17, 2019

## 2019-11-17 NOTE — PROGRESS NOTES
"CNM PROGRESS NOTE    SUBJECTIVE:  Patient pushing for 2 hours, feeling tired. Having break through pain in left side, using PCEA.     OBJECTIVE:  /68   Pulse 86   Temp 99.1  F (37.3  C) (Axillary)   Resp 16   Ht 1.6 m (5' 3\")   Wt 97.5 kg (215 lb)   LMP 02/18/2019 (Exact Date)   SpO2 94%   BMI 38.09 kg/m      Fetal heart tones: Baseline 125   Variability: moderate   Accelerations: present  Decelerations: absent    Contractions: Pt is yemi every 2-3 minutes, lasting  seconds and palpates strong    Cervix: 10/ 100% / +1, Vtx  ROM: clear fluid    Pitocin- 3 mu/min.  Antibiotics- none  Cervical ripening: N/A    ASSESSMENT:  IUP @ 38w6d active labor, second stage labor and minimal/no progress   GBS- negative     PLAN:     MD consultant on call Dr Anderson, paged to update pushing for 2 hours. Patient desires to push for 30 more minutes.  Reviewed slow/no progress with pushing. Possible OP presentation.     SHRUTHI Cuevas CNM      "

## 2019-11-17 NOTE — PROVIDER NOTIFICATION
11/17/19 0039   Provider Notification   Provider Name/Title Marycarmen FALL   Method of Notification At Bedside   Request Evaluate in Person   Notification Reason Lab/Diagnostic Study   Marycarmen FALL at bedside to update pt of decision for US to get current measurement of baby; educated pt on s/s of early labor and pain medication options. POC to remain NPO and allow pt to rest and monitor labor progress. Pt to decide on C/S or augmentation depending on labor status in the a.m. Orders received for intermittent ausculation overnight and to notify CNM if next BP elevated.

## 2019-11-17 NOTE — PROGRESS NOTES
OB Progress Note    Called to evaluate patient by CNM due to slow 2nd stage progress and macrosomia concerns.    EFW 3694gms on 10/29 MFM U/S which would project to approx 4200 grams now but U/S last night with EFW 3700gms   Clinical EFW (mine) 4000 gms    Patient had been consulted last night by Dr. Sutton regarding elective primary C/S after presenting with PROM last night.    They decided on a WILLIAM and progressed along a normal labor curve to complete dilation.  After an hour of laboring down maternal expulsive efforts began.    She has made only modest progress over 2.5 hrs.  I was consulted to assess progress and discuss options.    .  Category 1 with Ctx every 3 mins.  Maternal efforts very good and consistent by report and my observations.Vtx is at +1 station after 2.5 hrs.  There is some descent with pushing.    Discussed options.  1. Continued pushing as 2.5 hr second stage not atypical for primipara with large baby  2. VEVD discussed but station too high at present but would be an option if further descent achieved  3. Primary C/S for failure to descent CPD      Patient and  given an opportunity to consider options and have decided they feel most comfortable with proceeding to C/S.    Procedure explained and consent obtained.    Chuck Anderson MD

## 2019-11-17 NOTE — PROVIDER NOTIFICATION
11/17/19 1212   Provider Notification   Provider Name/Title Josephine SKELTON CNM   Method of Notification At Bedside   Request Evaluate in Person   Notification Reason Status Update   CNM at bedside to begin pushing.

## 2019-11-17 NOTE — PROGRESS NOTES
"CNM PROGRESS NOTE    SUBJECTIVE:  Patient received an epidural due to increasing discomfort from contractions overnight. She is resting comfortably with anesthesia now. Partner is bedside and supportive.    OBJECTIVE:  /69   Temp 98.2  F (36.8  C) (Oral)   Resp 16   Ht 1.6 m (5' 3\")   Wt 97.5 kg (215 lb)   LMP 02/18/2019 (Exact Date)   BMI 38.09 kg/m      Fetal heart tones: Baseline 125   Variability: moderate  Accelerations: present  Decelerations: absent    Contractions: Pt is yemi every 2.5-3 minutes, lasting 60-90 seconds and palpates mild    Cervix: 1-2/80/-2 per RN @ 0530  ROM: clear fluid    Pitocin- 4 mu/min.  Antibiotics- none  Cervical ripening: N/A    ASSESSMENT:  IUP @ 38w6d early labor   GBS- negative     PLAN:   Patient had additional elevated BP 4 hours apart, qualifying for gestational HTN. PIH labs so far are normal. Urine was collected via catheter and is pending.   Discussed plan to recheck cervix after adequate labor pattern has been established for 1-2 hours.   Reviewed \"anticipated\" labor course timeline and possible need for internal monitors if cervical change is minimal.  Frequent use of peanut ball and position changes to help facilitate labor process.   Updated Dr. Sutton on patient status of opting to trial labor.     Marycarmen Hoyos CNM      "

## 2019-11-17 NOTE — PROVIDER NOTIFICATION
11/17/19 0010   Fetal Assessment   Fetal Movement active   Fetal HR Assessment Method external US   Fetal HR (beats/min) 125   Fetal Heart Baseline Rate normal range   Fetal HR Variability moderate (amplitude range 6 to 25 bpm)   Fetal HR Accelerations absent  (broken strip)   Fetal HR Decelerations absent   Sinusoidal Pattern Present absent   CNM reviewed strip and is ok with tracing

## 2019-11-17 NOTE — PROVIDER NOTIFICATION
11/17/19 1110   Provider Notification   Provider Name/Title Josephineandrade SKELTON CNM   Method of Notification At Bedside   Request Evaluate in Person   Notification Reason Status Update   CNM at bedside to perform SVE.  FHT's and contraction pattern reviewed.  Orders received to labor down for 1 hour and then start pushing.

## 2019-11-17 NOTE — PROGRESS NOTES
"CNM PROGRESS NOTE    SUBJECTIVE:  Patient resting with epidural, reporting pressure in front near bladder. Position changes to try to rotate baby.     OBJECTIVE:  /63   Pulse 86   Temp 97.7  F (36.5  C) (Oral)   Resp 16   Ht 1.6 m (5' 3\")   Wt 97.5 kg (215 lb)   LMP 02/18/2019 (Exact Date)   SpO2 97%   BMI 38.09 kg/m      Fetal heart tones: Baseline 115   Variability: moderate   Accelerations: present  Decelerations: present    Contractions: Pt is yemi every 2-3.5 minutes, lasting 50-70 seconds and palpates moderate    Cervix: 10/ 100% / -1, Vtx  ROM: clear fluid    Pitocin- 6 mu/min.  Antibiotics- none  Cervical ripening: N/A    ASSESSMENT:  IUP @ 38w6d active labor, second stage labor and good progress   GBS- negative     PLAN:   Discussed labor down for 1 hour unless pressure becomes more intense.   comfort measures prn   reevaluate in 2-4 hours/PRN    SHRUTHI Cuevas CNM      "

## 2019-11-17 NOTE — PROVIDER NOTIFICATION
11/17/19 0639   Provider Notification   Provider Name/Title Marycarmen FALL   Method of Notification Phone   Request Evaluate in Person   Notification Reason Status Update;Labor Status;Uterine Activity;Pain   Marycarmen FALL at bedside to discuss labor progress with pt. POC to continue to monitor labor and increase pitocin as needed and recheck cervix in 1-2 hours. Pt comfortable with epidural.

## 2019-11-17 NOTE — PROVIDER NOTIFICATION
11/17/19 0137   Provider Notification   Provider Name/Title Marycarmen FALL    Method of Notification Phone   Notification Reason Other (Comment)   Clarified intermittent auscultation orders. Orders received for intermittent auscultation per Doptone hourly during early labor for 2 min to obtain baseline.

## 2019-11-17 NOTE — PROVIDER NOTIFICATION
11/16/19 2991   Provider Notification   Provider Name/Title Marycarmen FALL    Method of Notification At Bedside   Request Evaluate in Person   Notification Reason Status Update;Labor Status;Uterine Activity;Patient Arrived   Marycarmen FALL at bedside to evaluate pt and discuss primary elective c/s re: baby measuring large and augmentation labor options; SVE 1/50/-3.    Informed CNM of 1st /86. POC to consult w/ Dr Sutton regarding C/S.

## 2019-11-17 NOTE — PROVIDER NOTIFICATION
11/17/19 0850   Provider Notification   Provider Name/Title Josephine SKELTON EUFEMIA   Method of Notification Phone   Request Evaluate - Remote   Notification Reason Status Update   CNM updated on patient comfortable with epidural, FHT's with VD and ED, labor interventions of position changes, contraction pattern on 6 boby units of pitocin, patient requesting something to drink because she has been NPO, and protein urine.  Orders received for patient to have ice chips right now and will plan to perform SVE and come see patient.

## 2019-11-17 NOTE — PROVIDER NOTIFICATION
11/16/19 9179   Provider Notification   Provider Name/Title Dr Sutton/Marycarmen FALL    Method of Notification At Bedside   Request Evaluate in Person   Notification Reason Status Update;Labor Status   Dr Sutton at bedside to discuss risks and benefits of primary C/S; discussed augmentation process and labor options. All questions and concerns addressed per Dr Sutton. Marycarmen FALL also at bedside.

## 2019-11-17 NOTE — ANESTHESIA CARE TRANSFER NOTE
Patient: Aliya Yip    Procedure(s):   SECTION    Diagnosis: * No pre-op diagnosis entered *  Diagnosis Additional Information: No value filed.    Anesthesia Type:   Epidural     Note:    Patient transferred to:Labor and Delivery  Comments: Pt awake vss exchanging well report to rnHandoff Report: Identifed the Patient, Identified the Reponsible Provider, Reviewed the pertinent medical history, Discussed the surgical course, Reviewed Intra-OP anesthesia mangement and issues during anesthesia, Set expectations for post-procedure period and Allowed opportunity for questions and acknowledgement of understanding      Vitals: (Last set prior to Anesthesia Care Transfer)    CRNA VITALS  2019 1642 - 2019 1721      2019             Pulse:  117    SpO2:  100 %                Electronically Signed By: SHRUTHI Montana CRNA  2019  5:21 PM

## 2019-11-17 NOTE — PROVIDER NOTIFICATION
19 1515   Provider Notification   Provider Name/Title Dr. Anderson   Method of Notification At Bedside   Request Evaluate in Person   Notification Reason Status Update   MD at bedside to speak with patient about options of continuing to push, offering vacuum delivery if patient get move fetal station, or  delivery.  Patient discussed with  and decided to proceed with  section.

## 2019-11-17 NOTE — PROGRESS NOTES
"CNM PROGRESS NOTE    SUBJECTIVE:  Patient resting comfortably with labor epidural. Paged by RN having intermittent variables and early decelerations. Patient agreeable to SVE.     OBJECTIVE:  /71   Pulse 86   Temp 98.1  F (36.7  C) (Oral)   Resp 16   Ht 1.6 m (5' 3\")   Wt 97.5 kg (215 lb)   LMP 02/18/2019 (Exact Date)   SpO2 97%   BMI 38.09 kg/m      Fetal heart tones: Baseline 130   Variability: moderate   Accelerations: present  Decelerations: present    Contractions: Pt is yemi every 2-3 minutes, lasting 60-90 seconds and palpates mild    Cervix: 8/ 90% / -1, Vtx, suspect asynclitic presentation   ROM: clear fluid    Pitocin- 6 mu/min.  Antibiotics- none  Cervical ripening: N/A    ASSESSMENT:  IUP @ 38w6d active labor and good progress   GBS- negative     PLAN:   Suspect asynclitic presentation, try position changes to rotate baby   Suspect macrosomia, asked patient if she is okay with plan of care states she is at this time. Continue for attempt at vaginal delivery.  comfort measures prn   reevaluate in 2-4 hours/PRN    SHRUTHI Cuevas CNM      "

## 2019-11-17 NOTE — PROVIDER NOTIFICATION
11/17/19 0850   Provider Notification   Provider Name/Title Josephine SKELTON CNM   Method of Notification Phone   Request Evaluate - Remote   Notification Reason Status Update

## 2019-11-17 NOTE — PROVIDER NOTIFICATION
11/17/19 0902   Provider Notification   Provider Name/Title Josephine S CNM   Method of Notification At Bedside   Request Evaluate - Remote   Notification Reason Status Update   CNM at bedside to perform SVE. FHT's and contraction pattern.  SVE performed.

## 2019-11-17 NOTE — PROVIDER NOTIFICATION
11/16/19 5464   Provider Notification   Provider Name/Title Marycarmen FALL    Method of Notification At Bedside   Request Evaluate in Person   Notification Reason Status Update   Marycarmen FALL at bedside to inform pt Dr Herman sanchez to consult regarding C/S. Informed pt not to eat.

## 2019-11-17 NOTE — PROVIDER NOTIFICATION
11/17/19 0259   Provider Notification   Provider Name/Title Marycarmen EUFEMIA   Method of Notification Phone   Request Evaluate - Remote   Notification Reason Pain;Labor Status   CNM updated on pt experiencing increased intermittent back pain that radiates towards lower abdomen. Pt now requesting labor be augmented. CNM will put in augmentation orders, ok with pt receiving nitrous, IV fentanyl or epidural for pain.

## 2019-11-17 NOTE — ANESTHESIA PROCEDURE NOTES
Peripheral nerve/Neuraxial procedure note :         Assessment/Narrative  .  .  . Comments:  Labor epidural used as primary anesthesia for . 300mg of 2% lidocaine given. Good block.

## 2019-11-17 NOTE — PROGRESS NOTES
Brief Op Note  Pre-op Dx: 38w6d pregnancy                     Failure to descend                     Suspected macrosomia                       Post-op Dx: Same, delivered                       OP position  Name of Procedure: Primary LST  Section  Surgeon: Justin  Assistant:  none  Anesthesia:  Epidural  Operative Findings:  1. 9 #, 7 oz male infant with Apgars9 at one minute and 9 at five minutes  2. Normal uterus and adnexa  Drains: Thorne  QBL: 1278cc  Dr. Chuck Anderson

## 2019-11-17 NOTE — H&P
EUFEMIA Labor Admission History & Physical    Aliya Yip is a 29 year old  with an IUP at 38w5d  ; ,   Partner/support Person: Pete  Language Barrier: English  Clinic: [facility]  Provider: Ruba Hoyos CNM    Aliya Yip is admitted to the [facility] at [facility] on 2019 at 11:44 PM       History of present inllness/Chief Complaint:    Here with: spontaneous rupture of membranes. Grossly ruptured since , clear fluid.  Patient reports feeling irregular back pain that has been occurring all day.  Baby active Yes  Bloody show No   Any changes with medical history since last prenatal visit No  Declines syphilis screening.  No    Obstetrical history  Estimated Date of Delivery: 2019 determined by LMP and early US  Patient's last menstrual period was 2019 (exact date).   Dating U/S: 19    Fetal anatomic survey: Normal  Placenta: Posterior and velamentous insertion    PRENATAL COURSE  Prenatal care began at 8 wks gestation for a total of 12 prenatal visits.  Total wt gain 46; There is no height or weight on file to calculate BMI.  Prenatal Blood Pressure: WNL  Prenatal course was complicated by velamentous cord insertion, suspected macrosomia, and history of HSV  Tdap: given  Rhogam: n/a    Patient Active Problem List   Diagnosis     Supervision of normal pregnancy     Encounter for triage in pregnant patient     Indication for care in labor and delivery, antepartum       HISTORY  No Known Allergies  Past Medical History:   Diagnosis Date     Genital herpes      UTI (urinary tract infection)      No past surgical history on file.  Family History   Problem Relation Age of Onset     Diabetes Mother      Breast Cancer Mother      Hypertension Mother      Social History     Tobacco Use     Smoking status: Never Smoker     Smokeless tobacco: Never Used   Substance Use Topics     Alcohol use: Not Currently     Frequency: Never     OB History    Para  Term  AB Living   1 0 0 0 0 0   SAB TAB Ectopic Multiple Live Births   0 0 0 0 0      # Outcome Date GA Lbr Beto/2nd Weight Sex Delivery Anes PTL Lv   1 Current                LABS:  Lab Results   Component Value Date    ABO O 04/15/2019    RH Pos 04/15/2019    AS Neg 04/15/2019    HGB 11.5 (L) 2019    HEPBANG Nonreactive 04/15/2019    CHPCRT Negative 04/15/2019    GCPCRT Negative 04/15/2019       GBS Status:   Lab Results   Component Value Date    GBS Negative 10/28/2019     Rubella: Immune    HIV: Non-Reactive   Platelets:  243  1hr GCT:  pass    ROS   Pt is alert and oriented  Pt denies significant constitutional symptoms including fever and/or malaise.    Pt denies significant respiratory, cardiovacular, GI, or muscular/skeletal complaints.    Neuro: Denies HA and visual changes  Muscoloskeletal: Denies except for discomforts r/t pregnancy     PHYSICAL EXAM:  BP (!) 142/91   Temp 98.1  F (36.7  C) (Oral)   LMP 2019 (Exact Date)   General appearance:  healthy, alert and active   Heart: RRR  Lungs: CTA bilaterally, normal respiratory effort  Abdomen: gravid, single vertex fetus, non-tender, EFW 9-10 lbs.   Legs: reflexes 2+ bilaterally, no clonus, no edema     Contractions: Pt is not yemi    Fetal heart tones: Baseline 130   Variability: moderate   Accelerations: present  Decelerations: absent    NST: reactive    Cervix: 1/ 70%/ anterior/ average/ -3, Vtx  Bloody show: no  Membranes:  Grossly ruptured, clear fluid    ASSESSMENT:  29 year old  with chaves IUP 38w5d ruptured with no labor  NST reactive  GBS negative and membranes ruptured for 2 hours    PLAN:  Routine CN care  Labs ordered: CBC and type and screen, PIH labs. Patient has had two elevated BPs since admission. If BPs remain elevated throughout the night, will plan to collect random PCR via catheter.  Teaching done r/t comfort measures, pain management options, and labor processes.   Discussed r/b/a to watchful waiting  of SROM without labor or the use of medications to help induce labor.   Patient was scheduled to have an upcoming appointment with MD to discuss r/b/a to primary elective c/s for macrosomia. I discussed still offering this option to patient from on-call OB. Patient and  are interested in having this discussion. Paged Dr. Sutton for formal consult.  Patient and  are undecided at this time if she would like to trial labor or opt for primary c/s after consult from Dr. Sutton.   Patient last ate around 2000. Since she is not in labor c/s could not occur until about 0400. Will plan for watchful waiting of labor progress overnight so patient and  can think about options.  Will plan for bedside US for updated EFW.  Can plan for intermittent monitoring overnight.    Marycarmen Hoyos CNM

## 2019-11-17 NOTE — ANESTHESIA PREPROCEDURE EVALUATION
Anesthesia Pre-Procedure Evaluation    Patient: Aliya Yip   MRN: [id number] : 1990          Preoperative Diagnosis: * No pre-op diagnosis entered *    Procedure(s):   SECTION    Past Medical History:   Diagnosis Date     Genital herpes      UTI (urinary tract infection)      History reviewed. No pertinent surgical history.  Anesthesia Evaluation     . Pt has had prior anesthetic. Type: Regional           ROS/MED HX    ENT/Pulmonary:  - neg pulmonary ROS     Neurologic:  - neg neurologic ROS     Cardiovascular:  - neg cardiovascular ROS       METS/Exercise Tolerance:     Hematologic:  - neg hematologic  ROS       Musculoskeletal:  - neg musculoskeletal ROS       GI/Hepatic:  - neg GI/hepatic ROS       Renal/Genitourinary:  - ROS Renal section negative       Endo:  - neg endo ROS       Psychiatric:  - neg psychiatric ROS       Infectious Disease:  - neg infectious disease ROS       Malignancy:      - no malignancy   Other:    (+) Possibly pregnant C-spine cleared: N/A, no H/O Chronic Pain,no other significant disability                         Physical Exam  Normal systems: cardiovascular, pulmonary and dental    Airway   Mallampati: II  TM distance: >3 FB  Neck ROM: full    Dental     Cardiovascular       Pulmonary             Lab Results   Component Value Date    WBC 8.4 2019    HGB 10.4 (L) 2019    HCT 32.2 (L) 2019     2019     2013    POTASSIUM 3.9 2013    CHLORIDE 104 2013    CO2 25 2013    BUN 11 2013    CR 0.80 2019     (H) 2013    LINDSEY 9.2 2013    MAG 2.0 2013    ALBUMIN 4.3 2013    PROTTOTAL 7.3 2013    ALT 15 2019    AST 16 2019    ALKPHOS 91 2013    BILITOTAL 0.3 2013    TSH 0.89 2019    HCG Negative 2013       Preop Vitals  BP Readings from Last 3 Encounters:   19 131/77   19 134/84   19 122/80    Pulse Readings from Last 3  "Encounters:   11/17/19 86   04/15/19 74      Resp Readings from Last 3 Encounters:   11/17/19 16   08/23/19 18   01/05/13 16    SpO2 Readings from Last 3 Encounters:   11/17/19 94%   01/05/13 97%   01/04/13 98%      Temp Readings from Last 1 Encounters:   11/17/19 98.8  F (37.1  C) (Axillary)    Ht Readings from Last 1 Encounters:   11/16/19 1.6 m (5' 3\")      Wt Readings from Last 1 Encounters:   11/16/19 97.5 kg (215 lb)    Estimated body mass index is 38.09 kg/m  as calculated from the following:    Height as of this encounter: 1.6 m (5' 3\").    Weight as of this encounter: 97.5 kg (215 lb).       Anesthesia Plan      History & Physical Review  History and physical reviewed and following examination; no interval change.    ASA Status:  2 .    NPO Status:  > 8 hours    Plan for Epidural with Other induction. Maintenance will be Other.           Postoperative Care  Postoperative pain management:  IV analgesics.      Consents  Anesthetic plan, risks, benefits and alternatives discussed with:  Patient.  Use of blood products discussed: Yes.   Use of blood products discussed with Patient.  Consented to blood products.  .                 Zach Hernandez MD                    .  "

## 2019-11-17 NOTE — ANESTHESIA PREPROCEDURE EVALUATION
Anesthesia Pre-Procedure Evaluation    Patient: Aliya Yip   MRN: [id number] : 1990          Preoperative Diagnosis: * No surgery found *        Past Medical History:   Diagnosis Date     Genital herpes      UTI (urinary tract infection)      History reviewed. No pertinent surgical history.  Anesthesia Evaluation       history and physical reviewed .      No history of anesthetic complications          ROS/MED HX    ENT/Pulmonary:  - neg pulmonary ROS     Neurologic:  - neg neurologic ROS     Cardiovascular:  - neg cardiovascular ROS       METS/Exercise Tolerance:     Hematologic:         Musculoskeletal:         GI/Hepatic:     (+) GERD       Renal/Genitourinary:         Endo:         Psychiatric:         Infectious Disease:         Malignancy:         Other:                     neg OB ROS            Physical Exam  Normal systems: cardiovascular and pulmonary    Airway   Mallampati: II  TM distance: > 3 FB  Neck ROM: full  Mouth opening: > 3 cm    Dental     Cardiovascular       Pulmonary     Other findings: Lab Test        19                       0040          1123          2150          WBC          8.4          8.7          9.7           HGB          10.4*        11.5*        11.9          MCV          85           91           91            PLT          215          227          243            Lab Test        19                       0040          0424          NA            --          137           POTASSIUM     --          3.9           CHLORIDE      --          104           CO2           --          25            BUN           --          11            CR           0.80         0.69          ANIONGAP      --          8             LINDSEY           --          9.2           GLC           --          110*                Lab Results   Component Value Date    WBC 8.4 2019    HGB 10.4 (L) 2019    HCT 32.2 (L) 2019      "11/17/2019     01/04/2013    POTASSIUM 3.9 01/04/2013    CHLORIDE 104 01/04/2013    CO2 25 01/04/2013    BUN 11 01/04/2013    CR 0.80 11/17/2019     (H) 01/04/2013    LINDSEY 9.2 01/04/2013    MAG 2.0 01/04/2013    ALBUMIN 4.3 01/04/2013    PROTTOTAL 7.3 01/04/2013    ALT 15 11/17/2019    AST 16 11/17/2019    ALKPHOS 91 01/04/2013    BILITOTAL 0.3 01/04/2013    TSH 0.89 08/28/2019    HCG Negative 01/04/2013       Preop Vitals  BP Readings from Last 3 Encounters:   11/17/19 (!) 136/92   11/12/19 134/84   11/06/19 122/80    Pulse Readings from Last 3 Encounters:   04/15/19 74      Resp Readings from Last 3 Encounters:   11/17/19 16   08/23/19 18   01/05/13 16    SpO2 Readings from Last 3 Encounters:   01/05/13 97%   01/04/13 98%      Temp Readings from Last 1 Encounters:   11/17/19 98.2  F (36.8  C) (Oral)    Ht Readings from Last 1 Encounters:   11/16/19 1.6 m (5' 3\")      Wt Readings from Last 1 Encounters:   11/16/19 97.5 kg (215 lb)    Estimated body mass index is 38.09 kg/m  as calculated from the following:    Height as of this encounter: 1.6 m (5' 3\").    Weight as of this encounter: 97.5 kg (215 lb).       Anesthesia Plan      History & Physical Review      ASA Status:  .  OB Epidural Asa: 2            Postoperative Care      Consents  Anesthetic plan, risks, benefits and alternatives discussed with:  Patient..                 Ant Yadav MD                    .  "

## 2019-11-18 PROBLEM — D62 POSTOPERATIVE ANEMIA DUE TO ACUTE BLOOD LOSS: Status: ACTIVE | Noted: 2019-11-18

## 2019-11-18 PROBLEM — Z34.90 SUPERVISION OF NORMAL PREGNANCY: Status: RESOLVED | Noted: 2019-05-28 | Resolved: 2019-11-18

## 2019-11-18 PROBLEM — Z36.89 ENCOUNTER FOR TRIAGE IN PREGNANT PATIENT: Status: RESOLVED | Noted: 2019-08-23 | Resolved: 2019-11-18

## 2019-11-18 LAB — HGB BLD-MCNC: 7.5 G/DL (ref 11.7–15.7)

## 2019-11-18 PROCEDURE — 25000128 H RX IP 250 OP 636: Performed by: ANESTHESIOLOGY

## 2019-11-18 PROCEDURE — 25000132 ZZH RX MED GY IP 250 OP 250 PS 637: Performed by: OBSTETRICS & GYNECOLOGY

## 2019-11-18 PROCEDURE — 85018 HEMOGLOBIN: CPT | Performed by: OBSTETRICS & GYNECOLOGY

## 2019-11-18 PROCEDURE — 25000128 H RX IP 250 OP 636: Performed by: OBSTETRICS & GYNECOLOGY

## 2019-11-18 PROCEDURE — 36415 COLL VENOUS BLD VENIPUNCTURE: CPT | Performed by: OBSTETRICS & GYNECOLOGY

## 2019-11-18 PROCEDURE — 12000000 ZZH R&B MED SURG/OB

## 2019-11-18 RX ORDER — FERROUS SULFATE 325(65) MG
325 TABLET ORAL 2 TIMES DAILY WITH MEALS
Status: DISCONTINUED | OUTPATIENT
Start: 2019-11-18 | End: 2019-11-20 | Stop reason: HOSPADM

## 2019-11-18 RX ADMIN — NALBUPHINE HYDROCHLORIDE 5 MG: 10 INJECTION, SOLUTION INTRAMUSCULAR; INTRAVENOUS; SUBCUTANEOUS at 15:23

## 2019-11-18 RX ADMIN — SENNOSIDES AND DOCUSATE SODIUM 1 TABLET: 8.6; 5 TABLET ORAL at 17:58

## 2019-11-18 RX ADMIN — NALBUPHINE HYDROCHLORIDE 5 MG: 10 INJECTION, SOLUTION INTRAMUSCULAR; INTRAVENOUS; SUBCUTANEOUS at 04:25

## 2019-11-18 RX ADMIN — FERROUS SULFATE TAB 325 MG (65 MG ELEMENTAL FE) 325 MG: 325 (65 FE) TAB at 17:58

## 2019-11-18 RX ADMIN — ACETAMINOPHEN 975 MG: 325 TABLET, FILM COATED ORAL at 03:20

## 2019-11-18 RX ADMIN — NALBUPHINE HYDROCHLORIDE 5 MG: 10 INJECTION, SOLUTION INTRAMUSCULAR; INTRAVENOUS; SUBCUTANEOUS at 08:22

## 2019-11-18 RX ADMIN — ACETAMINOPHEN 975 MG: 325 TABLET, FILM COATED ORAL at 20:12

## 2019-11-18 RX ADMIN — NALBUPHINE HYDROCHLORIDE 5 MG: 10 INJECTION, SOLUTION INTRAMUSCULAR; INTRAVENOUS; SUBCUTANEOUS at 01:19

## 2019-11-18 RX ADMIN — ACETAMINOPHEN 975 MG: 325 TABLET, FILM COATED ORAL at 12:11

## 2019-11-18 RX ADMIN — KETOROLAC TROMETHAMINE 30 MG: 30 INJECTION, SOLUTION INTRAMUSCULAR at 12:11

## 2019-11-18 RX ADMIN — FERROUS SULFATE TAB 325 MG (65 MG ELEMENTAL FE) 325 MG: 325 (65 FE) TAB at 08:23

## 2019-11-18 RX ADMIN — KETOROLAC TROMETHAMINE 30 MG: 30 INJECTION, SOLUTION INTRAMUSCULAR at 17:59

## 2019-11-18 RX ADMIN — KETOROLAC TROMETHAMINE 30 MG: 30 INJECTION, SOLUTION INTRAMUSCULAR at 04:11

## 2019-11-18 NOTE — OP NOTE
Procedure Date: 2019      PREOPERATIVE DIAGNOSES:     1.  Intrauterine pregnancy at 38 weeks 6 days' gestation.   2.  Failure to descend.   3.  Suspected macrosomia.      POSTOPERATIVE DIAGNOSES:   1.  Intrauterine pregnancy at 38 weeks 6 days' gestation, delivered occiput posterior position.   2.  Failure to descend.   3.  Suspected macrosomia.      PROCEDURE:  Primary low segment transverse  section.      SURGEON:  Tariq Anderson MD      ANESTHESIA:  Epidural.      INDICATIONS:  Aliya Yip is a 29-year-old female,  1, para 0 at 38 weeks and 6 days' gestation and received her  care with the certified nurse midwife service.  She experienced spontaneous rupture of membranes on the evening of 2019 at approximately 9:45 p.m.  She presented to Labor and Delivery, where it was confirmed and she was found to be 1.5 cm dilated, 80% effaced with a vertex at -3 station.  Macrosomia had been suspected based on a maternal fetal medicine ultrasound on 10/29/2019, placing the estimated fetal weight at that time at 3694 grams.  There had been some interest voiced by the patient in elective primary  section, and she was consulted by my partner, Dr. Jc Sutton, on the evening of 2019, and the patient at that point decided a trial of labor was desired.      She entered an active pattern of labor and progressed to complete cervical dilatation on 2019, progressing along a normal labor curve.  Maternal expulsive efforts were initiated after a 1-hour laboring down, and after 2-1/2 hours of pushing, there was modest fetal descent to a +1 station.  Estimated fetal weight clinically was 4200 grams.  The patient was counseled as to options of management, including further attempts at maternal expulsive efforts.  We discussed operative vaginal delivery, but this was not advised given the high station and concern for macrosomia, or primary  section, which ultimately  was requested and consent obtained.      OPERATIVE FINDINGS:  A 9 pound 7 ounce male infant, Apgars 9 at one minute, 9 at five minutes in an occiput posterior presentation.  Normal uterus, tubes and ovaries bilaterally.      OPERATIVE PROCEDURE:  The patient had a labor epidural, which was augmented to achieve a surgical block.  She was taken to the operating room.  Thorne catheter placed and then prepped and draped in the usual sterile fashion in a left lateral tilt position.  Slight blood-tinged urine was noted preoperatively with placement of the Thorne catheter.      A surgical timeout was conducted and adequacy of the anesthetic confirmed.      A Pfannenstiel skin incision was made and carried down sharply through the subcutaneous tissue.  The fascia was identified and opened transversely.  The fascia was bluntly and sharply  from the underlying rectus abdominis muscles superiorly and inferiorly to the pubic symphysis.  The rectus abdominis muscles were bluntly and sharply  in the midline, exposing the peritoneum.  The peritoneal cavity was sharply entered.  The peritoneal incision was extended superiorly to the extent of the dissection and inferiorly to the superior border of the bladder.      The vesicouterine peritoneum was tented and incised and the bladder mobilized off the lower uterine segment.  A transverse incision was made in the lower uterine segment.  The infant was delivered from a cephalic presentation without difficulty.  The cord doubly clamped, cut, and the infant handed off to the  staff in attendance with findings as noted above.      Cord blood was collected for analysis.  The placenta was then extracted from the uterine cavity.  The uterus was exteriorized.  The cut edges of the uterus were grasped with ring forceps.  The uterine incision closed with 2 layers of 0 Vicryl, the second imbricating the first.  Several additional figure-of-X sutures were required along the  uterine incision line to achieve complete hemostasis.      The uterus was then returned to the pelvic cavity, the pelvic cavity irrigated with sterile saline and suctioned until clear.  The uterine incision inspected and hemostatic.      The fascia was then closed using a running suture of 0 Vicryl.  The subcutaneous tissues irrigated and hemostasis achieved using electrocautery as necessary.  The subcutaneous tissues were reapproximated using a running stitch of 2-0 plain gut.  The skin edges were reapproximated using a running subcuticular suture of 4-0 Monocryl.  Steri-Strips and a sterile bandage were placed.  The patient was taken to the recovery room in satisfactory condition.  There were no intraoperative complications.  QBL was 1278 mL due to the distended uterus and mild atony as well as the distended lower uterine segment.         EM BANKS MD             D: 2019   T: 2019   MT:       Name:     MONSERRAT REYNOLDS   MRN:      [id number]        Account:        [id number]   :      1990           Procedure Date: 2019      Document: [id number]

## 2019-11-18 NOTE — PROGRESS NOTES
"Konrad Ochoa CNM Courtesy Note: Postpartum Day #1    November 18, 2019  10:50 AM    SUBJECTIVE:  Patient is stable  and is tolerating acitivity well  Baby is rooming in  Complications since 2 hours post delivery: None, low hemoglobin, started on oral supplements  Pain is well controlled.  Patient is taking pain medications.  Breastfeeding status:initiated   Elimination:  She is voiding without difficulty.  She has not had a bowel movement  Desired contraception Unsure  Denies heavy bleeding and passing large clots.  Feels OK about birth experience.    OBJECTIVE:  /69   Pulse 90   Temp 97.6  F (36.4  C) (Oral)   Resp 16   Ht 1.6 m (5' 3\")   Wt 97.5 kg (215 lb)   LMP 02/18/2019 (Exact Date)   SpO2 98%   Breastfeeding Unknown   BMI 38.09 kg/m      Constitutional: healthy, alert and no distress    Breasts: Currently breastfeeding    Fundus: Uterine fundus is firm, non-tender and below the level of the umbilicus    Perineum: Perineum is intact and/or well approximated, minimal swelling    Lochia: Lochia is appropriate for the duration of time since delivery.     ASSESSMENT:  POD #1    Doing well.  Pain well-controlled.  Hemoglobin   Date Value Ref Range Status   11/18/2019 7.5 (L) 11.7 - 15.7 g/dL Final   ]      PLAN:  Continue routine care  Reviewed breastfeeding  Reviewed postpartum warning signs  Reviewed postpartum blues and postpartum depression warning signs  Plans unsure for contraception postpartum  Anticipated discharge 3rd day post op        LEELA MEI CNM        "

## 2019-11-18 NOTE — PLAN OF CARE
Pt. VSS. Pain managed with tylenol and Toradol. Nubain given for complaints of itching, Pt states adequate relief from itching. Dressing marked due to small amount of sanguinous drainage, marking not extended, without signs of infection. Thorne draining adequate amounts of dilute urine. Tolerating regular diet. Requires assistance with personal and infant cares. Breastfeeding infant with a shield. Attentive to infant needs and bonding well with infant.

## 2019-11-18 NOTE — PROVIDER NOTIFICATION
11/17/19 2106   Provider Notification   Provider Name/Title Dr. Brandt   Method of Notification Electronic Page   Notification Reason Patient Request     Pt is having itching from the duramorph used during her surgery. Dr. Brandt will place orders for nubain.     Neelam Zaragoza RN on 11/17/2019 at 9:07 PM

## 2019-11-18 NOTE — PROGRESS NOTES
"[facility] Postop C/S Note    S:  Patient without complaints other than typical soreness.  Minimal lochia.  Flatus passed, tolerating Regular diet well    O:  Blood pressure 116/63, pulse 79, temperature 97.6  F (36.4  C), temperature source Oral, resp. rate 16, height 1.6 m (5' 3\"), weight 97.5 kg (215 lb), last menstrual period 02/18/2019, SpO2 100 %, unknown if currently breastfeeding.            Intake/Output Summary (Last 24 hours) at 11/18/2019 0718  Last data filed at 11/18/2019 0632  Gross per 24 hour   Intake 2530 ml   Output 5187 ml   Net -2657 ml           Abdomen - Non-distended, Normoactive bowel sounds, soft, appropriately tender; Fundus firm, at umbilicus, nontender        Dressing/Incision - clean, dry, intact        Extremities - No calf tenderness    Hemoglobin   Date Value Ref Range Status   11/18/2019 7.5 (L) 11.7 - 15.7 g/dL Final   11/17/2019 10.4 (L) 11.7 - 15.7 g/dL Final   ]      A:   Postop Day# 1, s/p Primary LTCS        Postop anemia due to typical intraop blood loss - no sx's, no active bleeding    P:  1)  Fe sulfate 325 mg BID        2)  Thorne out, ambulate        3)  Probable D/C in 2 days      Jc Sutton MD            "

## 2019-11-18 NOTE — LACTATION NOTE
This note was copied from a baby's chart.  LC visit and assist with latching.  Her baby was attempting to latch with use of the nipple shield.  She has a blister from an initial poor latch and is now using the shield for each nursing session.  Her nipples bebo with stimulation, but lay flat without the shield.  She has good volumes of colostrum that were visualized within the shield.  LC provided some assistance to position him with use of the shield in the cradle hold.  Good suck pattern achieved and swallows pointed out to parents.  LC also educated parents on offering both sides with each feeding, discouraged pacifier use, encouraged nursing every 2-3 hours and on demand, discussed cluster feeding, and offered support with latch prn.  LC will follow up tomorrow.

## 2019-11-18 NOTE — PLAN OF CARE
Patient meeting expected goals. Bonding well with . Thorne removed at 1200. Patient using tylenol and ibuprofen for pain. Pain also using nubain for itching. Patient up and ambulated around room. Education taught to patient and patient verbalized understanding.

## 2019-11-19 PROCEDURE — 12000000 ZZH R&B MED SURG/OB

## 2019-11-19 PROCEDURE — 25000132 ZZH RX MED GY IP 250 OP 250 PS 637: Performed by: OBSTETRICS & GYNECOLOGY

## 2019-11-19 RX ADMIN — ACETAMINOPHEN 975 MG: 325 TABLET, FILM COATED ORAL at 12:16

## 2019-11-19 RX ADMIN — SENNOSIDES AND DOCUSATE SODIUM 1 TABLET: 8.6; 5 TABLET ORAL at 20:03

## 2019-11-19 RX ADMIN — FERROUS SULFATE TAB 325 MG (65 MG ELEMENTAL FE) 325 MG: 325 (65 FE) TAB at 09:54

## 2019-11-19 RX ADMIN — IBUPROFEN 800 MG: 800 TABLET ORAL at 06:10

## 2019-11-19 RX ADMIN — SENNOSIDES AND DOCUSATE SODIUM 1 TABLET: 8.6; 5 TABLET ORAL at 09:54

## 2019-11-19 RX ADMIN — FERROUS SULFATE TAB 325 MG (65 MG ELEMENTAL FE) 325 MG: 325 (65 FE) TAB at 17:49

## 2019-11-19 RX ADMIN — ACETAMINOPHEN 975 MG: 325 TABLET, FILM COATED ORAL at 20:03

## 2019-11-19 RX ADMIN — IBUPROFEN 800 MG: 800 TABLET ORAL at 12:16

## 2019-11-19 RX ADMIN — IBUPROFEN 800 MG: 800 TABLET ORAL at 23:59

## 2019-11-19 RX ADMIN — ACETAMINOPHEN 975 MG: 325 TABLET, FILM COATED ORAL at 04:08

## 2019-11-19 RX ADMIN — IBUPROFEN 800 MG: 800 TABLET ORAL at 17:49

## 2019-11-19 RX ADMIN — IBUPROFEN 800 MG: 800 TABLET ORAL at 00:03

## 2019-11-19 NOTE — PLAN OF CARE
VSS, Bonding well with baby. Pain is well controlled with tylenol and ibuprofen. Patient is voiding without difficulty and ambulating independently. Tolerating regular diet well. Independent in self and baby cares. Breastfeeding is going well.

## 2019-11-19 NOTE — PLAN OF CARE
Stable this shift. Dressing dry and intact, no increase on dried oozing on right bottom. Up to the bathroom, voiding well. Legs very edematous, encouraged fluids. Assisting with nursing with shield. Mother love given for sore nipples. Toradol, and tylenol for discomfort. Nubain given for itching. Family at bedside supportive.  Patients mobililty level scored using the bedside mobility assistance tool (BMAT). Patient is at a mobility level test number: 4. Mobility equipment used: none required. Required assist of 1 staff members. Further use of BMAT scoring not required.

## 2019-11-19 NOTE — PROGRESS NOTES
EUFEMIA Courtesy Rounds: Post Op Day 2    Aliya is doing well, resting comfortably in bed. States breastfeeding is going well and she is fairly independent with this. RN states latch and positioning is good. Her pain is well controlled. No questions currently. Plans to discharge tomorrow. Continue EUFEMIA courtesy rounds until then.     Giulia Valentin CNM, MARIO-BC

## 2019-11-19 NOTE — PLAN OF CARE
Patient meeting expected outcomes, VSS. Bonding well with infant (Yoel), supported in room by significant other (Bandar).  Fundus firm, midline; Lochia rubra, scant.   Feeding: Breast feeding infant well with shield.  Incision: Low abdominal incision is covered with island dressing, some dried serosanguinous drainage marked, unchanged.   Mobility: Patients mobililty level scored using the bedside mobility assistance tool (BMAT). Patient is at a mobility level test number: 4. Mobility equipment used: none required. Required assist of  zero staff members. Further use of BMAT scoring not required.  I&O: Voiding, regular diet.  Pain: Incisional, well controlled with scheduled tylenol/ibuprofen.   IV: Removed.   Concerns: Hemoglobin was 7.5, taking iron. Gestational hypertension stable.

## 2019-11-19 NOTE — LACTATION NOTE
This note was copied from a baby's chart.  LC visit. Her baby has reportedly been nursing ok today.  She was encouraged to call when nursing so staff may check the latch.  No call received.  She has been using the nipple shield and HIR jaundice level noted.  LC will continue to follow up and offer support with feeds.  RN observed and charted an excellent feeding this am.

## 2019-11-19 NOTE — PROGRESS NOTES
[facility]    Obstetrics Post-Op / Progress Note    Assessment & Plan   Assessment:  -2 Days Post-Op  Procedure(s):   SECTION    Doing well.  Clean wound without signs of infection.  No immediate surgical complications identified.  No excessive bleeding  Pain well-controlled.    Plan:  Ambulation encouraged  Breast feeding strategies discussed  Pain control measures as needed  Reportable signs and symptoms dicussed with the patient  Anticipate discharge tomorrow    Ally Russell MD    Interval History   Doing well.  Pain is well-controlled.  No fevers.  No history of wound drainage, warmth or significant erythema.  Good appetite.  Denies chest pain, shortness of breath, nausea or vomiting.  Ambulatory.  Breastfeeding well.    Medications     dextrose 5% lactated ringers Stopped (19)     - MEDICATION INSTRUCTIONS -       nitrous oxide/oxygen 50/50 blend       - MEDICATION INSTRUCTIONS -       NO Rho (D) immune globulin (RhoGam) needed - mother Rh POSITIVE       - MEDICATION INSTRUCTIONS -       oxytocin in 0.9% NaCl 100 mL/hr (19)     oxytocin in 0.9% NaCl         acetaminophen  975 mg Oral Q8H     ferrous sulfate  325 mg Oral BID w/meals     sodium chloride (PF)  3 mL Intracatheter Q8H       Physical Exam   Temp: 98.2  F (36.8  C) Temp src: Oral BP: 120/76 Pulse: 92   Resp: 16 SpO2: 99 %      Vitals:    19 2346   Weight: 97.5 kg (215 lb)     Vital Signs with Ranges  Temp:  [98  F (36.7  C)-98.2  F (36.8  C)] 98.2  F (36.8  C)  Pulse:  [] 92  Resp:  [16-18] 16  BP: (116-121)/(67-83) 120/76  SpO2:  [99 %-100 %] 99 %  I/O last 3 completed shifts:  In: -   Out: 1850 [Urine:1850]    Uterine fundus is firm, non-tender and at the level of the umbilicus  Incision C/D/I, can remove dressing later today with shower.  Extremities Non-tender    Data   Recent Labs   Lab Test 19   ABO O   RH Pos   AS Neg     Recent Labs   Lab Test 19  0652 19    HGB 7.5* 10.4*     Recent Labs   Lab Test 04/15/19  0913   RUQIGG 22

## 2019-11-20 VITALS
BODY MASS INDEX: 38.09 KG/M2 | SYSTOLIC BLOOD PRESSURE: 127 MMHG | RESPIRATION RATE: 16 BRPM | DIASTOLIC BLOOD PRESSURE: 87 MMHG | HEIGHT: 63 IN | WEIGHT: 215 LBS | TEMPERATURE: 98.4 F | OXYGEN SATURATION: 99 % | HEART RATE: 101 BPM

## 2019-11-20 PROCEDURE — 25000132 ZZH RX MED GY IP 250 OP 250 PS 637: Performed by: OBSTETRICS & GYNECOLOGY

## 2019-11-20 RX ORDER — FERROUS SULFATE 325(65) MG
325 TABLET ORAL 2 TIMES DAILY WITH MEALS
Qty: 45 TABLET | Refills: 3 | Status: SHIPPED | OUTPATIENT
Start: 2019-11-20 | End: 2021-10-20

## 2019-11-20 RX ORDER — IBUPROFEN 800 MG/1
800 TABLET, FILM COATED ORAL EVERY 6 HOURS PRN
Qty: 60 TABLET | Refills: 1 | Status: SHIPPED | OUTPATIENT
Start: 2019-11-20 | End: 2021-10-20

## 2019-11-20 RX ORDER — HYDROCODONE BITARTRATE AND ACETAMINOPHEN 5; 325 MG/1; MG/1
1 TABLET ORAL EVERY 6 HOURS PRN
Qty: 18 TABLET | Refills: 0 | Status: SHIPPED | OUTPATIENT
Start: 2019-11-20 | End: 2019-11-23

## 2019-11-20 RX ORDER — AMOXICILLIN 250 MG
2 CAPSULE ORAL 2 TIMES DAILY PRN
Qty: 45 TABLET | Refills: 3 | Status: SHIPPED | OUTPATIENT
Start: 2019-11-20 | End: 2021-10-20

## 2019-11-20 RX ADMIN — IBUPROFEN 800 MG: 800 TABLET ORAL at 06:00

## 2019-11-20 RX ADMIN — IBUPROFEN 800 MG: 800 TABLET ORAL at 12:00

## 2019-11-20 RX ADMIN — FERROUS SULFATE TAB 325 MG (65 MG ELEMENTAL FE) 325 MG: 325 (65 FE) TAB at 09:33

## 2019-11-20 RX ADMIN — ACETAMINOPHEN 975 MG: 325 TABLET, FILM COATED ORAL at 12:51

## 2019-11-20 RX ADMIN — SENNOSIDES AND DOCUSATE SODIUM 2 TABLET: 8.6; 5 TABLET ORAL at 09:33

## 2019-11-20 RX ADMIN — ACETAMINOPHEN 975 MG: 325 TABLET, FILM COATED ORAL at 03:45

## 2019-11-20 NOTE — DISCHARGE SUMMARY
DELIVERY DISCHARGE SUMMARY    Admit date: 2019  Discharge date: 2019     Admit Dx:   - 29 year old y/o  at 38w6d   - gHTN  - velamentous cord insertion  - suspected fetal macrosomia    Discharge Dx:  - Same as above, s/p primary low transverse  section  - acute blood loss anemia    Procedures:  - primary low transverse  section   - Spinal analgesia    Admit HPI:  Ms. Aliya Yip is a 29 year old  at 38w5d who was admitted on 2019 for SROM.  Please see her admit H&P for full details of her PMH, PSH, Meds, Allergies and exam on admit.    Hospital course:  After discussion of risk and and benefits and signing informed consent the patient was taken to the operating room for primary  section.    Indications for :  Aliya Yip is a 29-year-old female,  1, para 0 at 38 weeks and 6 days' gestation and received her  care with the certified nurse midwife service.  She experienced spontaneous rupture of membranes on the evening of 2019 at approximately 9:45 p.m.  She presented to Labor and Delivery, where it was confirmed and she was found to be 1.5 cm dilated, 80% effaced with a vertex at -3 station.  Macrosomia had been suspected based on a maternal fetal medicine ultrasound on 10/29/2019, placing the estimated fetal weight at that time at 3694 grams.  There had been some interest voiced by the patient in elective primary  section, and she was consulted by my partner, Dr. Jc Sutton, on the evening of 2019, and the patient at that point decided a trial of labor was desired.      She entered an active pattern of labor and progressed to complete cervical dilatation on 2019, progressing along a normal labor curve.  Maternal expulsive efforts were initiated after a 1-hour laboring down, and after 2-1/2 hours of pushing, there was modest fetal descent to a +1 station.  Estimated fetal weight clinically was 4200  grams.  The patient was counseled as to options of management, including further attempts at maternal expulsive efforts.  We discussed operative vaginal delivery, but this was not advised given the high station and concern for macrosomia, or primary  section, which ultimately was requested and consent obtained.    OPERATIVE FINDINGS:  A 9 pound 7 ounce male infant, Apgars 9 at one minute, 9 at five minutes in an occiput posterior presentation.  Normal uterus, tubes and ovaries bilaterally.     Please see her  Section Operative Note for full details regarding her delivery.    Her postoperative course was complicated by mild range BP. On POD#3, she was meeting all of her postpartum goals and deemed stable for discharge. She was voiding without difficulty, tolerating a regular diet without nausea and vomiting, her pain was well controlled on oral pain medicines and her lochia was appropriate. Her hemoglobin after delivery was 7.5. Her Rh status was pos and Rhogam was not indicated. At the time of discharge, she was breast feeding her infant and was undecided for contraception.     Physical exam on the day of discharge:  Vitals:    19 0920 19 1600 19 2103 19 2340   BP: 130/66 119/54 124/69 126/75   Pulse: 99 102  101   Resp: 16 16 16 16   Temp: 98.2  F (36.8  C) 98.3  F (36.8  C)  98.2  F (36.8  C)   TempSrc: Oral Oral  Oral   SpO2:       Weight:       Height:           General: sitting up, alert and cooperative  Abd: soft, non-distended, non-tender. Fundus firm, nontender, 2 cm below umbilicus.   Incision clean/dry/intact with steri strips in place.  Extremities: calves nontender, 1+ to 2+ edema of lower extremities bilaterally    Lab Results   Component Value Date    HGB 7.5 2019    HGB 10.4 2019     Blood type:   Lab Results   Component Value Date    RH Pos 2019       Discharge/Disposition:  Aliya Yip was discharged to home in stable condition with the  following instructions/medications:  1) Call for temperature > 100.4, foul smelling vaginal discharge, bleeding > 1 pad per hour x 2 hrs, pain not controlled by oral pain meds, severe constipation or severe nausea or vomiting.  2) She received contraceptive counseling.  3) She was instructed to follow-up with her primary OB in 6 weeks for a routine postpartum visit and in 1 week for a BP check.  4) She was instructed to continue her PNV on discharge if she wished to breast feed her infant.  5) She was discharged home with the following medications: ibuprofen, norco, stool softener, iron supplement    Jacquelyn Leon MD, MPH  [facility]

## 2019-11-20 NOTE — PROGRESS NOTES
Public Health Nurse (PHN) did not meet with patient today to discuss resources due to client having a Do Not Disturb sign on her door.

## 2019-11-20 NOTE — PLAN OF CARE
Doing well with self and infant cares, breast feeding independently using shield. Tolerating regular diet, ambulating in halls, denies difficulty voiding. Passed clot small egg sized clot in toilet, no free flow, fundus firm and down one. Ibuprofen and scheduled tylenol for pain with stated relief. FOB present and supportive, participating in baby cares.

## 2019-11-20 NOTE — LACTATION NOTE
This note was copied from a baby's chart.  LC visit. Her milk is coming in and gulping noted with feeds.  She has been using the nipple shield and  was questioning necessity.  We did try to latch without the shield, however, infant did not latch well.  Continued shield use for next two weeks was recommended while jaundice level is resolving.  LC assisted with better positioning and education how to bring baby up to the level of the breast.  Overall, infant nurses well once at breast, but is a little difficult for Aliya to position.  LC will continue to follow and provide a call after discharge.  All questions were answered.

## 2019-11-20 NOTE — PROGRESS NOTES
CNM Courtesy Visit:  Pt feeling well.  Ready for discharge today.  Reports some soreness with movement but overall pain is well controlled.  Ambulating and voiding without difficulty.  Eating and drinking normally.  Has some questions re: breastfeeding.  Feels baby's feedings are shorter, though notices swallows during the feeding and has noticed that the nipple shields she is using are full of milk after a feeding.  Wondering if her milk is coming in and baby is becoming more efficient at emptying the breast.  Reassured, will put in request for lactation to visit her today.  Denies h/o anxiety or depression.  Reviewed s/s of PPD and PP anxiety.  Discussed 2 week incision check with MDs and 6 week postpartum check with CNMs.  Encouraged to call prn with questions or concerns.

## 2019-11-20 NOTE — PLAN OF CARE
Data: Vital signs within normal limits. Postpartum checks within normal limits - see flow record. Patient eating and drinking normally. Patient able to empty bladder independently and is up ambulating. No apparent signs of infection. Incision healing well.Dressing removed.Steri strips to incision intact will remove in one week if not fallen off . Patient performing self cares and is able to care for infant.  Action: Patient medicated during the shift for pain. See MAR. Patient reassessed within 1 hour after each medication and pain was improved - patient stated she was comfortable. Patient education done about  See flow record.  Response: Positive attachment behaviors observed with infant. Support persons spouse present.   Plan:  discharge today 11/20, all teaching completed, home medications given, verbalizes understanding of all follow up instructions for herself and baby, has good support at home. Teaching completed.  .Will follow up in clinic in one week for BP check, pre eclampsia signs and symptoms reviewed. Ready to leave unit at 14.47

## 2019-11-20 NOTE — PLAN OF CARE
Pt. VSS. Pain managed well with tylenol and ibuprofen. Dressing marked due to small amount of drainage, marking not extended, without signs of infection. Denies feeling lightheaded or dizzy with low hemoglobin. Voiding adequately. Ambulating well independently. Independent in personal and infant cares. Breastfeeding infant with a shield. Attentive to infant needs and bonding well with infant. Anticipate discharge today.

## 2019-11-20 NOTE — DISCHARGE INSTRUCTIONS
[facility]    Discharge Summary      [facility]  Patient Status:  Rajwinder Hankins 2/21/2016  [address] [facility] [id number]    [facility]A  Attending  Yash Arzola MD    Hosp Day #  14 days    GA at birth: Gestational Age: 31w0d    Co Postop  Birth Instructions   Follow up in one week in clinic for BP check and then  at 6  weeks    postpartum check.                                                                                         Preeclampsia   Call your doctor right away if you have any of the following:  - Edema (swelling) in your face or hands  - Rapid weight gain-about 1 pound or more in a day  - Headache  - Abdominal pain on your right side  - Vision problems (flashes or spots)  - You have questions or concerns once you return home.    Activity       Do not lift more than 10 pounds for 6 weeks after surgery.  Ask family and friends for help when you need it.    No driving until you have stopped taking your pain medications (usually two weeks after surgery).    No heavy exercise or activity for 6 weeks.  Don't do anything that will put a strain on your surgery site.    Don't strain when using the toilet.  Your care team may prescribe a stool softener if you have problems with your bowel movements.     To care for your incision:       Keep the incision clean and dry.    Do not soak your incision in water. No swimming or hot tubs until it has fully healed. You may soak in the bathtub if the water level is below your incision.    Do not use peroxide, gel, cream, lotion, or ointment on your incision.    Adjust your clothes to avoid pressure on your surgery site (check the elastic in your underwear for example).     You may see a small amount of clear or pink drainage and this is normal.  Check with your health care provider:       If the drainage increases or has an odor.    If the incision reddens, you have swelling, or develop a rash.    If you have increased pain and the medicine we prescribed doesn't help.    If you have a fever above 100.4 F (38 C) with or without chills when placing thermometer under your tongue.   The area around your incision (surgery wound), will feel numb.  This is normal. The numbness should go away in  Blood culture sent at admission 02/21. No empiric antibiotics. Physical exam  Assessment & Plan    Exam:     Gen: pink, alert, active, no distress, vigorous. No jaundice. Normal hips. Awake and alert.   HEENT: AFSF, not dysmorphic.  B/L red refl less than a year.     Keep your hands clean:  Always wash your hands before touching your incision (surgery wound). This helps reduce your risk of infection. If your hands aren't dirty, you may use an alcohol hand-rub to clean your hands. Keep your nails clean and short.    Call your healthcare provider if you have any of these symptoms:       You soak a sanitary pad with blood within 1 hour, or you see blood clots larger than a golf ball.    Bleeding that lasts more than 6 weeks.    Vaginal discharge that smells bad.    Severe pain, cramping or tenderness in your lower belly area.    A need to urinate more frequently (use the toilet more often), more urgently (use the toilet very quickly), or it burns when you urinate.    Nausea and vomiting.    Redness, swelling or pain around a vein in your leg.    Problems breastfeeding or a red or painful area on your breast.    Chest pain and cough or are gasping for air.    Problems with coping with sadness, anxiety or depression. If you have concerns about hurting yourself or the baby, call your provider immediately.      You have questions or concerns after you return home.                   Void Urine Occurrence  7 x  7 x  1 x      Stool Occurrence  7 x  6 x  1 x      Emesis Occurrence      0 x            Labs:         Respiratory Support:    Vent/Device information:          O2 Device : None (room air)              2/21/2017 10:15  3/6/2

## 2019-12-31 ENCOUNTER — PRENATAL OFFICE VISIT (OUTPATIENT)
Dept: OBGYN | Facility: CLINIC | Age: 29
End: 2019-12-31
Payer: COMMERCIAL

## 2019-12-31 VITALS — BODY MASS INDEX: 30.29 KG/M2 | SYSTOLIC BLOOD PRESSURE: 118 MMHG | WEIGHT: 171 LBS | DIASTOLIC BLOOD PRESSURE: 74 MMHG

## 2019-12-31 DIAGNOSIS — Z30.011 ENCOUNTER FOR INITIAL PRESCRIPTION OF CONTRACEPTIVE PILLS: ICD-10-CM

## 2019-12-31 PROBLEM — D62 POSTOPERATIVE ANEMIA DUE TO ACUTE BLOOD LOSS: Status: RESOLVED | Noted: 2019-11-18 | Resolved: 2019-12-31

## 2019-12-31 PROCEDURE — 99207 ZZC POST PARTUM EXAM: CPT | Performed by: ADVANCED PRACTICE MIDWIFE

## 2019-12-31 RX ORDER — ACETAMINOPHEN AND CODEINE PHOSPHATE 120; 12 MG/5ML; MG/5ML
0.35 SOLUTION ORAL DAILY
Qty: 30 TABLET | Refills: 6 | Status: SHIPPED | OUTPATIENT
Start: 2019-12-31 | End: 2020-04-15

## 2019-12-31 NOTE — PROGRESS NOTES
Midwife Postpartum 6 Week Visit    Aliya Yip is a 29 year old here for a postpartum checkup.     Delivery date was 19. She had a  and C/S of a viable boy, weight 9 pounds 7.3 oz., with no complications      Since delivery, she has been breast feeding.  She has not had any signs of infection, her lochia stopped after 1, 3 weeks.  She has not had other complications.      She is voiding and having bowel movements without difficulty.       Contraception was discussed and patient desires mini pill / progesterone only pill.   She  has not had intercourse since delivery.   She complains of No  perineal discomfort.     Mood is Stable  Patient screened for postpartum depression.   Depression Rating was: 0  Last PHQ-9 score on record = No flowsheet data found.  Last GAD7 score on record = No flowsheet data found.      ROS:  CONSTITUTIONAL: NEGATIVE for fever, chills, change in weight  INTEGUMENTARU/SKIN: NEGATIVE for worrisome rashes, moles or lesions  EYES: NEGATIVE for vision changes or irritation  ENT: NEGATIVE for ear, mouth and throat problems  RESP: NEGATIVE for significant cough or SOB  BREAST: NEGATIVE for masses, tenderness or discharge  CV: NEGATIVE for chest pain, palpitations or peripheral edema  GI: NEGATIVE for nausea, abdominal pain, heartburn, or change in bowel habits  : NEGATIVE for unusual urinary or vaginal symptoms. Periods have not yet resumed  MUSCULOSKELETAL: NEGATIVE for significant arthralgias or myalgia  NEURO: NEGATIVE for weakness, dizziness or paresthesias  ENDOCRINE: NEGATIVE for temperature intolerance, skin/hair changes  HEME/ALLERGY/IMMUNE: NEGATIVE for bleeding problems  PSYCHIATRIC: NEGATIVE for changes in mood or affect      Current Outpatient Medications:      ferrous sulfate (FEROSUL) 325 (65 Fe) MG tablet, Take 1 tablet (325 mg) by mouth 2 times daily (with meals), Disp: 45 tablet, Rfl: 3     ibuprofen (ADVIL/MOTRIN) 800 MG tablet, Take 1 tablet (800 mg) by mouth  every 6 hours as needed for other (cramping), Disp: 60 tablet, Rfl: 1     Prenatal Vit-Fe Fumarate-FA (PRENATAL MULTIVITAMIN W/IRON) 27-0.8 MG tablet, Take 1 tablet by mouth daily, Disp: , Rfl:      senna-docusate (SENOKOT-S/PERICOLACE) 8.6-50 MG tablet, Take 2 tablets by mouth 2 times daily as needed for constipation, Disp: 45 tablet, Rfl: 3.   OB History    Para Term  AB Living   1 1 1 0 0 1   SAB TAB Ectopic Multiple Live Births   0 0 0 0 1      # Outcome Date GA Lbr Beto/2nd Weight Sex Delivery Anes PTL Lv   1 Term 19 38w6d 04:10 :18 4.29 kg (9 lb 7.3 oz) M    DHEERAJ      Name: DONNA REYNOLDS      Apgar1: 9  Apgar5: 9     Last pap:  No results found for: PAP      EXAM:  LMP 2019 (Exact Date)   BMI: There is no height or weight on file to calculate BMI.  Exam:  Constitutional: healthy, alert and no distress  Head: Normocephalic. No masses, lesions, tenderness or abnormalities  Neck: Neck supple. No adenopathy. Thyroid symmetric, normal size,, Carotids without bruits.  ENT: ENT exam normal, no neck nodes or sinus tenderness  Cardiovascular: negative, PMI normal. No lifts, heaves, or thrills. RRR. No murmurs, clicks gallops or rub  Respiratory: negative, Percussion normal. Good diaphragmatic excursion. Lungs clear  Breasts:  Deferred as patient is lactating  Gastrointestinal: Abdomen soft, non-tender. BS normal. No masses, organomegaly    Musculoskeletal: extremities normal- no gross deformities noted, gait normal and normal muscle tone  Skin: no suspicious lesions or rashes  Neurologic: Gait normal. Reflexes normal and symmetric. Sensation grossly WNL.  Psychiatric: mentation appears normal and affect normal/bright  Hematologic/Lymphatic/Immunologic: Normal cervical lymph nodes  PELVIC EXAM:  Vulva: No lesions, well healed, BUS WNL, no tenderness  Vagina: Moist, pink, discharge normal  well rugated, no lesions  Cervix:smooth, no lesions  Uterus: Involuted to normal size, non-tender,  no masses palpated  Ovaries: No masses palpated  Pelvic tone: WNL  Rectal exam: deferred      ASSESSMENT:   Normal postpartum exam after .        ICD-10-CM    1. Routine postpartum follow-up Z39.2    2. Encounter for initial prescription of contraceptive pills Z30.011 norethindrone (MICRONOR) 0.35 MG tablet       PLAN:  No results found for any visits on 19.    Return as needed or at time of next expected pap, pelvic, or breast exam.  Teaching: birth control  Family Planning:mini pill / progesterone only pill  Encourage Kegels and abdominal exercise.  Continue a multivitamin/prenatal supplement, especially if breastfeeding.  Pap smear was not obtained today.  Postpartum Hgb was not done today.        SHRUTHI Bronson, CNM

## 2019-12-31 NOTE — PATIENT INSTRUCTIONS
Preventive Health Recommendations  Female Ages 21 - 39  Yearly exam:   See your health care provider every year in order to  1. Review health changes.  2. Discuss preventive care.    3. Review your medicines if you your provider has prescribed any.      You do not need a Pap test if your uterus was removed (hysterectomy) and you have not had cancer.    You should be tested each year for STIs (sexually transmitted diseases) if you're at risk.     Talk to your provider about how often to have your cholesterol checked, about every 5 years if normal.    If you are at risk for diabetes, you should have a diabetes test (fasting glucose).    Vitamin D deficiency screening and thyroid disease screening is also recommended every 3-5 years depending on risk factors or more often if symptomatic  PAP Smear:   Until age 30: Get a Pap test every three years (more often if you have had an abnormal result)    After age 30: Talk to your provider about whether you should have a Pap test every 3 years or have a Pap test with HPV screening every 5 years.   Shots: Get a flu shot each year. Get a tetanus shot every 10 years.   Nutrition:     Eat at least 5 servings of fruits and vegetables each day    Eat REAL food, stay away from processed foods.    Eat whole-grain bread, whole-wheat pasta and brown rice instead of white grains and rice.    For bone health:  Eat calcium-rich foods or take calcium pills (500 to 600 mg) twice a day with food (1200 mg per day). Also take vitamin D (2000 IUs) each day.     Lifestyle    Exercise regularly (30 minutes a day, 5 days of the week). This will help you control your weight and prevent disease.    Weight bearing exercise such as weight lifting, walking, running and yoga will be beneficial later in life preventing osteoporosis     Limit alcohol to one drink per day.    No smoking.     Wear sunscreen to prevent skin cancer.    See your dentist every six months to one year for an exam and cleaning  depending on their recommendation    Get an eye exam every two years or more often if you wear glasses or contacts.      Progesterone Only Oral Contraceptive Pills (POPs/Minipill)      Minipills contain only progesterone. Combined pills contain both estrogen and progesterone. Minipills are a great option for women who are breastfeeding or for women who cannot take estrogen. POPs do not increase your risk for blood clots like combined OCP's do. Women who are breastfeeding should call to change contraceptive type when they are done breastfeeding.     How it works:    The minipill effectively prevents pregnancy by actions of the progesterone hormone on various parts of the reproductive system. It makes cervical mucus too thick for sperm to move easily through, it makes the lining of the uterus too thin for a fertilized egg to grow, and it sometimes prevents ovulation all together. The minipill is slightly less effective than combined pills, the pregnancy rate is about 3%.    How to take the minipill:      Minipills come in packs containing several weeks of pills, each pill is marked in the packs so that one pill is taken every day of the week      Unlike combined OCPs there are no placebo pills      Start the first pack of pills on the first day of your menstrual period, if you are postpartum you can start the pills right away      It is extremely important to take the pill AT THE SAME TIME EVERY DAY (at least within the same hour)      As soon as you finish one pack, start another      If you experience illness such as nausea and vomiting or diarrhea use a backup method for 7 days       Missed/forgotten pills:      If you miss one pill take it as soon as you remember, take your next pill the next day at your usual time and used a backup method like a condom for 7 days if it was more than 3 hours late              If you miss two pills do not take the missed pills, just continue taking your minipill as you normally  would, but make sure to use a backup method for the duration of that package, until you start a new one      If you miss more than two pills please contact the clinic      Menstrual changes:      Women taking the minipill often experience short/irregular cycles or may not have a period at all      You may also experience some spotting or bleeding in between your cycles      Contact the clinic if:      You miss one or two pills and then do not get a period      If you have been experiencing normal periods and unexpectedly miss one      If you have any symptoms of pregnancy such as breast tenderness, increased tiredness, or cramping in your lower abdomen      If you have heavy or prolonged bleeding, abdominal pain, fever, or cramps    You can stop taking the minipill if you wish to get pregnant.     Please call with any questions or concerns:    Konrad Ochoa  [phone]

## 2020-02-24 ENCOUNTER — TELEPHONE (OUTPATIENT)
Dept: OBGYN | Facility: CLINIC | Age: 30
End: 2020-02-24

## 2020-02-24 NOTE — TELEPHONE ENCOUNTER
Panel Management Review    Composite cancer screening  Chart review shows that this patient is due/due soon for the following Pap Smear  Summary:    Patient is due/failing the following:   PAP    Action needed:   Routed to abstracting for review.    Type of outreach:   None, routed to abstracting    Questions for provider review:    None                                                                                                                                    Gio Quinn, [facility]       Chart routed to abstracting    Please abstract the following data from this visit with this patient into the appropriate field in Epic:    Tests that can be patient reported without a hard copy:    Pap smear done on this date: 02/13/2018 (approximately), by this group: [facility], results were NIL.     Other Tests found in the patient's chart through Chart Review/Care Everywhere:    Pap smear done by this group [facility] on this date: 02/13/2018    Note to Abstraction: If this section is blank, no results were found via Chart Review/Care Everywhere.       .      '

## 2020-09-09 ENCOUNTER — MYC REFILL (OUTPATIENT)
Dept: OBGYN | Facility: CLINIC | Age: 30
End: 2020-09-09

## 2020-09-09 DIAGNOSIS — Z30.011 ENCOUNTER FOR INITIAL PRESCRIPTION OF CONTRACEPTIVE PILLS: ICD-10-CM

## 2020-09-09 RX ORDER — ACETAMINOPHEN AND CODEINE PHOSPHATE 120; 12 MG/5ML; MG/5ML
0.35 SOLUTION ORAL DAILY
Qty: 90 TABLET | Refills: 1 | Status: CANCELLED | OUTPATIENT
Start: 2020-09-09

## 2020-09-09 RX ORDER — DESOGESTREL AND ETHINYL ESTRADIOL 0.15-0.03
1 KIT ORAL DAILY
Qty: 84 TABLET | Refills: 4 | Status: SHIPPED | OUTPATIENT
Start: 2020-09-09 | End: 2021-02-10

## 2020-11-29 ENCOUNTER — HEALTH MAINTENANCE LETTER (OUTPATIENT)
Age: 30
End: 2020-11-29

## 2021-02-10 ENCOUNTER — OFFICE VISIT (OUTPATIENT)
Dept: OBGYN | Facility: CLINIC | Age: 31
End: 2021-02-10
Payer: COMMERCIAL

## 2021-02-10 VITALS — SYSTOLIC BLOOD PRESSURE: 118 MMHG | DIASTOLIC BLOOD PRESSURE: 80 MMHG | BODY MASS INDEX: 30.4 KG/M2 | WEIGHT: 171.6 LBS

## 2021-02-10 DIAGNOSIS — Z01.419 WOMEN'S ANNUAL ROUTINE GYNECOLOGICAL EXAMINATION: Primary | ICD-10-CM

## 2021-02-10 DIAGNOSIS — Z30.41 ENCOUNTER FOR SURVEILLANCE OF CONTRACEPTIVE PILLS: ICD-10-CM

## 2021-02-10 DIAGNOSIS — Z12.4 PAP SMEAR FOR CERVICAL CANCER SCREENING: ICD-10-CM

## 2021-02-10 PROCEDURE — 99395 PREV VISIT EST AGE 18-39: CPT | Performed by: ADVANCED PRACTICE MIDWIFE

## 2021-02-10 PROCEDURE — G0145 SCR C/V CYTO,THINLAYER,RESCR: HCPCS | Performed by: ADVANCED PRACTICE MIDWIFE

## 2021-02-10 RX ORDER — NORGESTIMATE AND ETHINYL ESTRADIOL 7DAYSX3 28
1 KIT ORAL DAILY
Qty: 84 TABLET | Refills: 4 | Status: SHIPPED | OUTPATIENT
Start: 2021-02-10 | End: 2021-10-20

## 2021-02-10 SDOH — HEALTH STABILITY: MENTAL HEALTH: HOW MANY STANDARD DRINKS CONTAINING ALCOHOL DO YOU HAVE ON A TYPICAL DAY?: NOT ASKED

## 2021-02-10 SDOH — HEALTH STABILITY: MENTAL HEALTH: HOW OFTEN DO YOU HAVE 6 OR MORE DRINKS ON ONE OCCASION?: NOT ASKED

## 2021-02-10 NOTE — PATIENT INSTRUCTIONS
Birth Control Pills    Combination birth control pills contain both estrogen and progestin.  There are numerous brands of birth control pills otherwise known as oral contraceptive pills (OCP's).  Each brand has a different combination of estrogen and progestin so every woman can find the one that is right for her.  OCP's are a safe and effective way to prevent pregnancy in most women.    How do OCP's work  OCP's work by several different mechanisms.  They cause changes in the cervix and the lining of the uterus.  The cervical mucus becomes thicker which will prevent the sperm from entering the cervix.  The lining of the uterus becomes thin which helps prevent an egg from attaching to it.  In combination, these events make it unlikely that you will get pregnant. It may also prevent ovulation completely.    Benefits of OCP's  May reduce your risk of:  Cancer of the uterus and ovary, ovarian cysts, pelvic infection, bone loss, benign breast disease, anemia, ectopic pregnancy and acne.  It may also decrease symptoms of PCOS (Polycystic Ovarian Syndrome). OCP's may also improve cramping during menstrual cycle and may make you cycle shorter and lighter.    How to take OCP's  You have several choices on how to start taking your OCP's:    You can start the pill on the first day of your next period    You can start the pill on the Sunday after your next period starts    You can start the pill on the first day it was prescribed no matter where you are in your cycle.  In this case, you will need to make sure you are not pregnant.    No matter when you start your first pack, you will always start your next pack on the same day you started your first pack.    You should take the pill at the same time every day.  Do not skip any pills.  If you miss any pills, are taking antibiotics or vomit, use a backup method of birth control until you get your next period.    Pills come in packs of 21, 28 or 91 pills:      21 Pills:  Take one  pill at the same time every day for 21 days.  Wait 7 days before beginning your next pack.  During these 7 days you will have your period.    28 Pills:  Take one pill at the same time every day for 28 days.  The last 7 pills in the pack do not contain estrogen/progestin.  During these 7 days you will have your period.      91 Pills:  Take one pill at the same time every day for 91 days.  The last 7 pills in the pack do not contain estrogen/progestin.  During these 7 days you will have your period.  With this method you will only have 4 periods a year.  Some women eventually have no bleeding at all.    Each pill pack comes with instructions.  Please make sure you read them and understand these instructions.      What to do if you miss a pill    Occasionally you may forget to take a pill or not take it on time.  Take the missed pill as soon as you remember.  Take the next pill at the regular time.  It is ok if you take two pills in one day.  You may feel a bit queasy or have some spotting, this is normal and should not be concerning.  If you have missed more than one pill use a back up method of birth control and call the clinic for instructions on how to proceed.    Who should not take Combined OCP's    If you have a history or have blood clots    A history of cerebral vascular accident (stroke)    If you have ischemic heart or coronary artery disease    Known of suspected breast cancer    Known or suspected pregnancy    Smoker and over age 35    Any know liver abnormality    Migraine headaches with an aura    Undiagnosed abnormal vaginal bleeding    High blood pressure    Common side effects when starting OCP's  Headache, nausea, dizziness, breakthrough bleeding, missed periods, tender breasts, depression and anxiety.  Most side effects are minor and resolve in the first few months. Take the pill with meals or at bedtime if nausea occurs.    Call or return for care in the following circumstances:      Unexpected  missed periods or very heavy bleeding    Persistent vaginal bleeding    Depression    Suspected pregnancy    Persistent side effects such as:  Nausea, irregular menses or mood changes.    Seek emergency care immediately for the following:  ACHES    Abdominal or pelvic pain    Chest pain    Severe headache     Visual disturbances    Severe leg pain or numbness or tingling of extremities    Lastly-    Use of a backup method is recommended for the first cycle    Condoms are recommended to protect against STI's    OCP's are 99% effective if take correctly.    The pill helps to keep your periods regular, lighter and shorter and reduces cramps.  If you desire a pregnancy, you may stop taking your OCPs.     Please call the clinic with questions and concerns  [facility]  [phone]    Preventive Health Recommendations  Female Ages 21 - 39  Yearly exam:   See your health care provider every year in order to  1. Review health changes.  2. Discuss preventive care.    3. Review your medicines if you your provider has prescribed any.      You do not need a Pap test if your uterus was removed (hysterectomy) and you have not had cancer.    You should be tested each year for STIs (sexually transmitted diseases) if you're at risk.     Talk to your provider about how often to have your cholesterol checked, about every 5 years if normal.    If you are at risk for diabetes, you should have a diabetes test (fasting glucose).    Vitamin D deficiency screening and thyroid disease screening is also recommended every 3-5 years depending on risk factors or more often if symptomatic  PAP Smear:   Until age 30: Get a Pap test every three years (more often if you have had an abnormal result)    After age 30: Talk to your provider about whether you should have a Pap test every 3 years or have a Pap test with HPV screening every 5 years.   Shots: Get a flu shot each year. Get a tetanus shot every 10 years.   Nutrition:     Eat at least 5  servings of fruits and vegetables each day    Eat REAL food, stay away from processed foods.    Eat whole-grain bread, whole-wheat pasta and brown rice instead of white grains and rice.    For bone health:  Eat calcium-rich foods or take calcium pills (500 to 600 mg) twice a day with food (1200 mg per day). Also take vitamin D (2000 IUs) each day.     Lifestyle    Exercise regularly (30 minutes a day, 5 days of the week). This will help you control your weight and prevent disease.    Weight bearing exercise such as weight lifting, walking, running and yoga will be beneficial later in life preventing osteoporosis     Limit alcohol to one drink per day.    No smoking.     Wear sunscreen to prevent skin cancer.    See your dentist every six months to one year for an exam and cleaning depending on their recommendation    Get an eye exam every two years or more often if you wear glasses or contacts.

## 2021-02-10 NOTE — PROGRESS NOTES
Aliya is a 30 year old  female who presents for annual exam.     Besides routine health maintenance,  she would like to discuss that her menses have become heavier.  HPI:  lAiya presents for her annual exam. She feels that since having the baby and starting OCPs her menses are much heavier than they used to be. Prior to becoming pregnant, she took tri sprintec, and her menses were very light and only a few days in length. Now her menses are > 5 days in length and very heavy  Menses are regular q 28-30 days and heavy lasting 5+ days.   Menses flow: normal, heavy and with clots.    Patient's last menstrual period was 2021 (approximate)..   Using oral contraceptives for contraception.    She is currently considering pregnancy.    REPRODUCTIVE/GYNECOLOGIC HISTORY:  Aliya is sexually active with male partner(s) and is currently in monogamous relationship x 10 years.   STI testing offered?  Declined  History of abnormal Pap smear:  No  SOCIAL HISTORY  Abuse: does not report having previously been physical or sexually abused.    Do you feel safe in your environment? YES       She  reports that she has never smoked. She has never used smokeless tobacco.      Last PHQ-9 score on record = No flowsheet data found.  Last GAD7 score on record = No flowsheet data found.  Alcohol Score = ETOH socially    HEALTH MAINTENANCE:  Cholesterol: (No results found for: CHOL History of abnormal lipids: No  Mammo: NA . History of abnormal Mammo: Not applicable.  Regular Self Breast Exams: No  TSH: (  TSH   Date Value Ref Range Status   2019 0.89 0.40 - 4.00 mU/L Final    )  Pap; (No results found for: PAP )  Immunizations up to date: yes  (Gardasil, Tdap, Flu)  Health maintenance updated:  yes    HEALTHY HABITS  Eating habits: follows a balanced nutrition diet  Calcium/Vitamin D intake: source:  dairy Adequate?   Exercise: How often do you exercise? None  Have you had an eye exam in the last two years? YES     Do you  routinely see the dentist once or twice yearly? YES         HISTORY:  OB History    Para Term  AB Living   1 1 1 0 0 1   SAB TAB Ectopic Multiple Live Births   0 0 0 0 1      # Outcome Date GA Lbr Beto/2nd Weight Sex Delivery Anes PTL Lv   1 Term 19 38w6d 04:10  05:18 4.29 kg (9 lb 7.3 oz) M    DHEERAJ      Name: DONNA REYNOLDS      Apgar1: 9  Apgar5: 9      Obstetric Comments   C/S delivery      Past Medical History:   Diagnosis Date     Genital herpes      UTI (urinary tract infection)      Past Surgical History:   Procedure Laterality Date      SECTION N/A 2019    Procedure:  SECTION;  Surgeon: Tariq Anderson MD;  Location: [facility] OR     Family History   Problem Relation Age of Onset     Diabetes Mother      Breast Cancer Mother      Hypertension Mother      Social History     Socioeconomic History     Marital status:      Spouse name: None     Number of children: None     Years of education: None     Highest education level: None   Occupational History     Occupation: lock desk analyst   Social Needs     Financial resource strain: None     Food insecurity     Worry: None     Inability: None     Transportation needs     Medical: None     Non-medical: None   Tobacco Use     Smoking status: Never Smoker     Smokeless tobacco: Never Used   Substance and Sexual Activity     Alcohol use: Not Currently     Frequency: Never     Comment: socially     Drug use: Never     Sexual activity: Yes     Partners: Male     Birth control/protection: OCP   Lifestyle     Physical activity     Days per week: None     Minutes per session: None     Stress: None   Relationships     Social connections     Talks on phone: None     Gets together: None     Attends Jainism service: None     Active member of club or organization: None     Attends meetings of clubs or organizations: None     Relationship status: None     Intimate partner violence     Fear of current or ex partner: None      Emotionally abused: None     Physically abused: None     Forced sexual activity: None   Other Topics Concern     None   Social History Narrative     None       Current Outpatient Medications:      desogestrel-ethinyl estradiol (APRI) 0.15-30 MG-MCG tablet, Take 1 tablet by mouth daily, Disp: 84 tablet, Rfl: 4     ferrous sulfate (FEROSUL) 325 (65 Fe) MG tablet, Take 1 tablet (325 mg) by mouth 2 times daily (with meals) (Patient not taking: Reported on 12/31/2019), Disp: 45 tablet, Rfl: 3     ibuprofen (ADVIL/MOTRIN) 800 MG tablet, Take 1 tablet (800 mg) by mouth every 6 hours as needed for other (cramping) (Patient not taking: Reported on 2/10/2021), Disp: 60 tablet, Rfl: 1     norethindrone (MICRONOR) 0.35 MG tablet, Take 1 tablet (0.35 mg) by mouth daily, Disp: 90 tablet, Rfl: 1     Prenatal Vit-Fe Fumarate-FA (PRENATAL MULTIVITAMIN W/IRON) 27-0.8 MG tablet, Take 1 tablet by mouth daily, Disp: , Rfl:      senna-docusate (SENOKOT-S/PERICOLACE) 8.6-50 MG tablet, Take 2 tablets by mouth 2 times daily as needed for constipation (Patient not taking: Reported on 12/31/2019), Disp: 45 tablet, Rfl: 3   No Known Allergies    Past medical, surgical, social and family history were reviewed and [facility] in [facility].    ROS:   CONSTITUTIONAL: NEGATIVE for fever, chills, change in weight  INTEGUMENTARU/SKIN: NEGATIVE for worrisome rashes, moles or lesions  EYES: NEGATIVE for vision changes or irritation  ENT: NEGATIVE for ear, mouth and throat problems  RESP: NEGATIVE for significant cough or SOB  BREAST: NEGATIVE for masses, tenderness or discharge  CV: NEGATIVE for chest pain, palpitations or peripheral edema  GI: NEGATIVE for nausea, abdominal pain, heartburn, or change in bowel habits  : NEGATIVE for unusual urinary or vaginal symptoms. Periods are regular.  MUSCULOSKELETAL: NEGATIVE for significant arthralgias or myalgia  NEURO: NEGATIVE for weakness, dizziness or paresthesias  ENDOCRINE: NEGATIVE for temperature  intolerance, skin/hair changes  HEME/ALLERGY/IMMUNE: NEGATIVE for bleeding problems  PSYCHIATRIC: NEGATIVE for changes in mood or affect    PHYSICAL EXAM:  /80 (BP Location: Right arm, Cuff Size: Adult Regular)   Wt 77.8 kg (171 lb 9.6 oz)   LMP 01/27/2021 (Approximate)   Breastfeeding No   BMI 30.40 kg/m     BMI: Body mass index is 30.4 kg/m .  Constitutional: healthy, alert and no distress  Neck: symmetrical, thyroid normal size, no masses present, no lymphadenopathy present.   Cardiovascular: RRR, no murmurs present  Respiratory: breathing unlabored, lungs CTA bilaterally  Breast:Deferred as patient has recently been lactating.  Gastrointestinal: abdomen soft, non-tender, bowel sounds present  PELVIC EXAM:  Vulva: No lesions, no adenopathy, BUS WNL  Vagina: Moist, pink, discharge normal  well rugated, no lesions  Cervix:smooth, pink, no visible lesions, Pap obtained  Uterus: Normal size, non-tender, mobile  Ovaries: No masses palpated  Rectal exam: deferred    ASSESSMENT/PLAN:      ICD-10-CM    1. Women's annual routine gynecological examination  Z01.419    2. Encounter for surveillance of contraceptive pills  Z30.41 norgestim-eth estrad triphasic (ORTHO TRI-CYCLEN) 0.18/0.215/0.25 MG-35 MCG tablet   3. Pap smear for cervical cancer screening  Z12.4 Pap imaged thin layer screen reflex to HPV if ASCUS - recommend age 25 - 29           COUNSELING:   Reviewed preventive health counseling, as reflected in patient instructions       Regular exercise       Healthy diet/nutrition       Contraception   reports that she has never smoked. She has never used smokeless tobacco.      SHRUTHI Bronson, CNM

## 2021-02-10 NOTE — NURSING NOTE
"Chief Complaint   Patient presents with     Physical     Pap due        Initial /80 (BP Location: Right arm, Cuff Size: Adult Regular)   Wt 77.8 kg (171 lb 9.6 oz)   LMP 2021 (Approximate)   Breastfeeding No   BMI 30.40 kg/m   Estimated body mass index is 30.4 kg/m  as calculated from the following:    Height as of 19: 1.6 m (5' 3\").    Weight as of this encounter: 77.8 kg (171 lb 9.6 oz).  BP completed using cuff size: regular    Questioned patient about current smoking habits.  Pt. has never smoked.          The following HM Due: pap smear      Loida Godinez CMA on 2/10/2021 at 10:04 AM         "

## 2021-02-15 LAB
COPATH REPORT: NORMAL
PAP: NORMAL

## 2021-05-27 ENCOUNTER — RECORDS - HEALTHEAST (OUTPATIENT)
Dept: ADMINISTRATIVE | Facility: CLINIC | Age: 31
End: 2021-05-27

## 2021-05-30 ENCOUNTER — RECORDS - HEALTHEAST (OUTPATIENT)
Dept: ADMINISTRATIVE | Facility: CLINIC | Age: 31
End: 2021-05-30

## 2021-06-01 VITALS — HEIGHT: 63 IN | WEIGHT: 161 LBS | BODY MASS INDEX: 28.53 KG/M2

## 2021-06-16 NOTE — PROGRESS NOTES
ASSESSMENT and PLAN:  1. Contraception  She is pleased with her oral contraception.  That was refilled for her.  - norgestimate-ethinyl estradiol (TRI-SPRINTEC, 28,) 0.18/0.215/0.25 mg-35 mcg (28) Tab tablet; Take 1 tablet by mouth daily.  Dispense: 84 tablet; Refill: 3    2. Screening for cervical cancer  This was done today.  She had no concerns for sexually transmitted infections.  - Gynecologic Cytology (PAP Smear)    3. Annual physical exam  Her immunizations are updated today with the flu vaccine.  We will get blood work.  She will follow-up in one year.  - Comprehensive Metabolic Panel  - Lipid Cascade        Patient Instructions   Today's labs will be available on [facility].  If you aren't signed up, then we'll mail them out.  If anything needs more immediate follow up, we'll also call.    Take care!      Orders Placed This Encounter   Procedures     Influenza, Seasonal,Quad Inj, 36+ MOS     Comprehensive Metabolic Panel     Lipid Cascade     Order Specific Question:   Fasting is required?     Answer:   Yes     Medications Discontinued During This Encounter   Medication Reason     norgestimate-ethinyl estradiol (TRI-SPRINTEC, 28,) 0.18/0.215/0.25 mg-35 mcg (28) Tab tablet Reorder       No Follow-up on file.    ASSESSED PROBLEMS:  Problem List Items Addressed This Visit     None      Visit Diagnoses     Screening for cervical cancer    -  Primary    Relevant Orders    Gynecologic Cytology (PAP Smear)    Contraception        Relevant Medications    norgestimate-ethinyl estradiol (TRI-SPRINTEC, 28,) 0.18/0.215/0.25 mg-35 mcg (28) Tab tablet    Annual physical exam        Relevant Orders    Comprehensive Metabolic Panel (Completed)    Lipid Cascade (Completed)          CHIEF COMPLAINT:  Chief Complaint   Patient presents with     Annual Exam     fasting       HISTORY OF PRESENT ILLNESS:  Aliya Yip is a 27 y.o. female is presenting to the clinic today for an annual exam.  She wanted me to look at a spot on  "the back of her left leg.  She thinks it's a mole, but it feels like a scab.  She can't see it well.  She wants to scratch it off, but hasn't.    Her mother was recently diagnosed with breast cancer.  She wants to know when she should begin screening.  She does not think her mother has had genetic testing done.    Health Maintenance:  She has her eyes examined regularly and visits the dentist on a regular basis.   Mammogram: We discussed getting a baseline at age 31 or 36.  I also reviewed with her that she would likely be a candidate for breast MRI at some point.  Colonoscopy: NA  Pap Smear: March 6, 2014, will update today    REVIEW OF SYSTEMS:   She denies nipple discharge and drainage. GYN: she is pleased w/ her OCPs   All other systems are negative.    PFSH:  No past medical history on file.  No past surgical history on file.  Family History   Problem Relation Age of Onset     Diabetes Mother      Breast cancer Mother 60     Hypertension Mother      Congenital heart disease Brother 18     Social History     Social History     Marital status:      Spouse name: N/A     Number of children: N/A     Years of education: N/A     Occupational History     Not on file.     Social History Main Topics     Smoking status: Never Smoker     Smokeless tobacco: Never Used     Alcohol use 0.5 oz/week     1 Standard drinks or equivalent per week     Drug use: No     Sexual activity: Yes     Partners: Male     Birth control/ protection: OCP     Other Topics Concern     Not on file     Social History Narrative       VITALS:  Vitals:    02/13/18 1151   BP: 120/78   Patient Site: Right Arm   Patient Position: Sitting   Cuff Size: Adult Regular   Pulse: 70   SpO2: 98%   Weight: 161 lb (73 kg)   Height: 5' 3\" (1.6 m)     Wt Readings from Last 3 Encounters:   02/13/18 161 lb (73 kg)   12/28/16 160 lb 6.4 oz (72.8 kg)   09/16/15 141 lb 14.4 oz (64.4 kg)       PHYSICAL EXAM:  Constitutional:  Reveals an alert, pleasant adult " female.   Vitals:  Noted.   Head: Normocephalic, without obvious abnormality, atraumatic   Ears: TM's normal bilaterally   Eyes: PERRL, conjunctiva/corneas clear, EOM's intact   Throat: Lips, mucosa, and tongue normal; teeth and gums normal   Neck: Normal ROM, no carotid bruits, no thyromegaly   Lungs: Clear to auscultation bilaterally, respirations unlabored.   Breast exam:  Normal skin overlying her breasts bilaterally no discrete nodule is palpable in the axilla bilaterally  Heart: Regular rate and rhythm, S1 and S2 normal, no murmur, rub, or gallop,   Abdomen: Soft, non-tender, bowel sounds active all four quadrants, no masses, no organomegaly   Extremities: Extremities normal, atraumatic, no cyanosis or edema   Pelvic:Normally developed genitalia with no external lesions or eruptions. Vagina and cervix show no lesions, inflammation, discharge or tenderness. No cystocele, No rectocele. Uterus nontender.  No adnexal mass or tenderness.  Skin: Skin color, texture, turgor normal, the spot on her leg is a tiny scab, easily removed w/ etoh wipe.  No skin pigmentation changes noted  Neurologic: Normal     MEDICATIONS:  Current Outpatient Prescriptions   Medication Sig Dispense Refill     norgestimate-ethinyl estradiol (TRI-SPRINTEC, 28,) 0.18/0.215/0.25 mg-35 mcg (28) Tab tablet Take 1 tablet by mouth daily. 84 tablet 3     valACYclovir (VALTREX) 500 MG tablet Take 1 tablet (500 mg total) by mouth 2 (two) times a day for 3 days. 6 tablet 11     No current facility-administered medications for this visit.

## 2021-09-19 ENCOUNTER — HEALTH MAINTENANCE LETTER (OUTPATIENT)
Age: 31
End: 2021-09-19

## 2021-10-20 ENCOUNTER — PRENATAL OFFICE VISIT (OUTPATIENT)
Dept: NURSING | Facility: CLINIC | Age: 31
End: 2021-10-20
Payer: COMMERCIAL

## 2021-10-20 DIAGNOSIS — Z34.80 PRENATAL CARE, SUBSEQUENT PREGNANCY, UNSPECIFIED TRIMESTER: Primary | ICD-10-CM

## 2021-10-20 PROCEDURE — 99207 PR NO CHARGE NURSE ONLY: CPT

## 2021-10-20 RX ORDER — VITAMIN A ACETATE, .BETA.-CAROTENE, ASCORBIC ACID, CHOLECALCIFEROL, .ALPHA.-TOCOPHEROL ACETATE, DL-, THIAMINE MONONITRATE, RIBOFLAVIN, NIACINAMIDE, PYRIDOXINE HYDROCHLORIDE, FOLIC ACID, CYANOCOBALAMIN, CALCIUM CARBONATE, FERROUS FUMARATE, ZINC OXIDE, AND CUPRIC OXIDE 2000; 2000; 120; 400; 22; 1.84; 3; 20; 10; 1; 12; 200; 27; 25; 2 [IU]/1; [IU]/1; MG/1; [IU]/1; MG/1; MG/1; MG/1; MG/1; MG/1; MG/1; UG/1; MG/1; MG/1; MG/1; MG/1
1 TABLET ORAL DAILY
COMMUNITY

## 2021-10-20 NOTE — PROGRESS NOTES
NPN nurse visit done over the phone. Pt will be given NPN folder and book at her upcoming appt.   Discussed optional screening available to assess chromosomal anomalies. Questions answered. Pt advised to call the clinic if she has any questions or concerns related to her pregnancy. Prenatal labs will be obtained at her upcoming appt. New prenatal visit scheduled on 10/27/21 with Ruba Hoyos CNM.    9w6d    Lab Results   Component Value Date    PAP NIL 02/10/2021           Patient supplied answers from flow sheet for:  Prenatal OB Questionnaire.  Past Medical History  Have you ever recieved care for your mental health? : No  Have you ever been in a major accident or suffered serious trauma?: No  Within the last year, has anyone hit, slapped, kicked or otherwise hurt you?: No  In the last year, has anyone forced you to have sex when you didn't want to?: No    Past Medical History 2   Have you ever received a blood transfusion?: No  Would you accept a blood transfusion if was medically recommended?: Yes  Does anyone in your home smoke?: No   Is your blood type Rh negative?: No  Have you ever ?: (!) Yes  Have you been hospitalized for a nonsurgical reason excluding normal delivery?: (!) Yes (head wound, respiratory distress due to illness)  Have you ever had an abnormal pap smear?: No    Past Medical History (Continued)  Do you have a history of abnormalities of the uterus?: No  Did your mother take MARIELOS or any other hormones when she was pregnant with you?: No  Do you have any other problems we have not asked about which you feel may be important to this pregnancy?: No

## 2021-10-27 ENCOUNTER — ANCILLARY PROCEDURE (OUTPATIENT)
Dept: ULTRASOUND IMAGING | Facility: CLINIC | Age: 31
End: 2021-10-27
Payer: COMMERCIAL

## 2021-10-27 ENCOUNTER — PRENATAL OFFICE VISIT (OUTPATIENT)
Dept: OBGYN | Facility: CLINIC | Age: 31
End: 2021-10-27
Payer: COMMERCIAL

## 2021-10-27 VITALS — BODY MASS INDEX: 29.41 KG/M2 | DIASTOLIC BLOOD PRESSURE: 72 MMHG | WEIGHT: 166 LBS | SYSTOLIC BLOOD PRESSURE: 120 MMHG

## 2021-10-27 DIAGNOSIS — Z34.81 ENCOUNTER FOR SUPERVISION OF OTHER NORMAL PREGNANCY, FIRST TRIMESTER: Primary | ICD-10-CM

## 2021-10-27 DIAGNOSIS — Z34.80 PRENATAL CARE, SUBSEQUENT PREGNANCY, UNSPECIFIED TRIMESTER: ICD-10-CM

## 2021-10-27 LAB
ABO/RH(D): NORMAL
ANTIBODY SCREEN: NEGATIVE
ERYTHROCYTE [DISTWIDTH] IN BLOOD BY AUTOMATED COUNT: 11.4 % (ref 10–15)
HCT VFR BLD AUTO: 37.4 % (ref 35–47)
HGB BLD-MCNC: 13 G/DL (ref 11.7–15.7)
MCH RBC QN AUTO: 31 PG (ref 26.5–33)
MCHC RBC AUTO-ENTMCNC: 34.8 G/DL (ref 31.5–36.5)
MCV RBC AUTO: 89 FL (ref 78–100)
PLATELET # BLD AUTO: 244 10E3/UL (ref 150–450)
RBC # BLD AUTO: 4.19 10E6/UL (ref 3.8–5.2)
SPECIMEN EXPIRATION DATE: NORMAL
WBC # BLD AUTO: 6.6 10E3/UL (ref 4–11)

## 2021-10-27 PROCEDURE — 87340 HEPATITIS B SURFACE AG IA: CPT | Performed by: ADVANCED PRACTICE MIDWIFE

## 2021-10-27 PROCEDURE — 76817 TRANSVAGINAL US OBSTETRIC: CPT | Performed by: OBSTETRICS & GYNECOLOGY

## 2021-10-27 PROCEDURE — 36415 COLL VENOUS BLD VENIPUNCTURE: CPT | Performed by: ADVANCED PRACTICE MIDWIFE

## 2021-10-27 PROCEDURE — 86850 RBC ANTIBODY SCREEN: CPT | Performed by: ADVANCED PRACTICE MIDWIFE

## 2021-10-27 PROCEDURE — 86780 TREPONEMA PALLIDUM: CPT | Performed by: ADVANCED PRACTICE MIDWIFE

## 2021-10-27 PROCEDURE — 99207 PR FIRST OB VISIT: CPT | Performed by: ADVANCED PRACTICE MIDWIFE

## 2021-10-27 PROCEDURE — 87591 N.GONORRHOEAE DNA AMP PROB: CPT | Performed by: ADVANCED PRACTICE MIDWIFE

## 2021-10-27 PROCEDURE — 87086 URINE CULTURE/COLONY COUNT: CPT | Performed by: ADVANCED PRACTICE MIDWIFE

## 2021-10-27 PROCEDURE — 86762 RUBELLA ANTIBODY: CPT | Performed by: ADVANCED PRACTICE MIDWIFE

## 2021-10-27 PROCEDURE — 85027 COMPLETE CBC AUTOMATED: CPT | Performed by: ADVANCED PRACTICE MIDWIFE

## 2021-10-27 PROCEDURE — 86900 BLOOD TYPING SEROLOGIC ABO: CPT | Performed by: ADVANCED PRACTICE MIDWIFE

## 2021-10-27 PROCEDURE — 86803 HEPATITIS C AB TEST: CPT | Performed by: ADVANCED PRACTICE MIDWIFE

## 2021-10-27 PROCEDURE — 87389 HIV-1 AG W/HIV-1&-2 AB AG IA: CPT | Performed by: ADVANCED PRACTICE MIDWIFE

## 2021-10-27 PROCEDURE — 86901 BLOOD TYPING SEROLOGIC RH(D): CPT | Performed by: ADVANCED PRACTICE MIDWIFE

## 2021-10-27 PROCEDURE — 87491 CHLMYD TRACH DNA AMP PROBE: CPT | Performed by: ADVANCED PRACTICE MIDWIFE

## 2021-10-27 PROCEDURE — 76801 OB US < 14 WKS SINGLE FETUS: CPT | Performed by: OBSTETRICS & GYNECOLOGY

## 2021-10-27 NOTE — PROGRESS NOTES
Monserrat Yip is a 31 year old   woman,  who is a previous CNM patient. She presents for a new OB Visit. This was a planned pregnancy.     FOB is Pete who is in good health.  FOB IS actively involved in relationship and this pregnancy.     Victor M presents for their first OB visit. Had a  section with their first pregnancy due to OP position and large baby, 9 pounds, 7 oz. They are unsure if they wish to try for TRIAL OF LABOR AFTER  vs scheduled repeat  section.  She has not had bleeding since her LMP.    She denies abdominal pain since her LMP.  She has had nausea.  has not had vomiting.  Any personal or family history of blood clots? No  History of sickle cell anemia or trait? No         Patient's last menstrual period was 2021 (within days)..  Estimated Date of Delivery: 2022 Ultrasound consistent with LMP.    MENSTRUAL HISTORY    frequency: every 28-30 days  Last PAP:  2021  History of abnormal Pap?  No    Health maintenance updated:  yes        Current medications are:    Current Outpatient Medications:      Prenatal Vit-Fe Fumarate-FA (PNV PRENATAL PLUS MULTIVITAMIN) 27-1 MG TABS per tablet, Take 1 tablet by mouth daily, Disp: , Rfl:      What medications are you currently taking? PNV    INFECTION HISTORY  HIV: No  Hepatitis B: No  Hepatitis C: No  Tuberculosis: No    Genital Herpes self: no  Herpes partner:  no  Chlamydia:  no  Gonorrhea:  no  HPV: No  BV:  No  Syphilis:  No  Chicken Pox:  Yes       OB HISTORY  OB History    Para Term  AB Living   2 1 1 0 0 1   SAB TAB Ectopic Multiple Live Births   0 0 0 0 1      # Outcome Date GA Lbr Beto/2nd Weight Sex Delivery Anes PTL Lv   2 Current            1 Term 19 38w6d 04:10 / 05:18 4.29 kg (9 lb 7.3 oz) M CS-Unspec   DHEERAJ      Name: GAIL,MALE-MONSERRAT      Apgar1: 9  Apgar5: 9      Obstetric Comments   C/S delivery        History of GDM: No,  PTL : No,  History of HTN  in pregnancy: No,  Thrombocytopenia: No,  Shoulder dystocia: No,  Vacuum Extraction: No  PPH: No   3rd of 4th degree laceration: No.   Other complications: Yes -       Low Dose Aspirin for Preeclampsia Prevention:  Consider for those with 1 high risk or 2  moderate risk factors     High risk: previous pregnancy with preeclampsia, multifetal gestation, chronic htn, diabetes, chronic kidney disease, autoimmune disorder     Medium risk: nulliparity, BMI >30, family hx preeclampsia, age >/= 35,  race, low SES, personal risk factors (hx low birth weight, hx stillbirth, >10yrs between pregnancies).      Patient does not qualify for low dose aspirin therapy        PERSONAL HISTORY  Exercise Habits:  aerobic 4-7 days per week.  Employment: Full time.  Her job involves light activity with no potential for toxic exposure.    Travel plans:  are none planned.   Diet: follows a balanced nutrition diet  Prenatal vitamins? Yes:   Fish Oil? Yes:   Abuse concerns? Not addressed as partner present  Any history if abuse, varbal, physical, sexual?  Not addressed as partner present  Hgb A1c screen:  BMI > 30: No, 1st degree family DM: Yes, History of GDM: No, PCOS: No, High risk ethnicity: No    Social History     Socioeconomic History     Marital status:      Spouse name: Not on file     Number of children: Not on file     Years of education: Not on file     Highest education level: Not on file   Occupational History     Occupation: lock desk analyst   Tobacco Use     Smoking status: Never Smoker     Smokeless tobacco: Never Used   Vaping Use     Vaping Use: Never used   Substance and Sexual Activity     Alcohol use: Not Currently     Comment: socially     Drug use: Never     Sexual activity: Yes     Partners: Male   Other Topics Concern     Not on file   Social History Narrative     Not on file     Social Determinants of Health     Financial Resource Strain:      Difficulty of Paying Living Expenses:     Food Insecurity:      Worried About Running Out of Food in the Last Year:      Ran Out of Food in the Last Year:    Transportation Needs:      Lack of Transportation (Medical):      Lack of Transportation (Non-Medical):    Physical Activity:      Days of Exercise per Week:      Minutes of Exercise per Session:    Stress:      Feeling of Stress :    Social Connections:      Frequency of Communication with Friends and Family:      Frequency of Social Gatherings with Friends and Family:      Attends Rastafari Services:      Active Member of Clubs or Organizations:      Attends Club or Organization Meetings:      Marital Status:    Intimate Partner Violence:      Fear of Current or Ex-Partner:      Emotionally Abused:      Physically Abused:      Sexually Abused:          She  reports that she has never smoked. She has never used smokeless tobacco.    STD testing offered?  Accepted  Last PHQ-9 score on record = No flowsheet data found.  Last GAD7 score on record = No flowsheet data found.  Alcohol Score = 0  Referral/Meds needed? no    PAST MEDICAL/SURGICAL HISTORY  Past Medical History:   Diagnosis Date     Genital herpes      UTI (urinary tract infection)      Past Surgical History:   Procedure Laterality Date      SECTION N/A 2019    Procedure:  SECTION;  Surgeon: Tariq Anderson MD;  Location:  OR       FAMILY HISTORY  Family History   Problem Relation Age of Onset     Diabetes Mother      Breast Cancer Mother 60.00     Hypertension Mother      Congenital heart disease Brother 18.00         ROS:  CONSTITUTIONAL: NEGATIVE for fever, chills, change in weight  INTEGUMENTARU/SKIN: NEGATIVE for worrisome rashes, moles or lesions  EYES: NEGATIVE for vision changes or irritation  ENT: NEGATIVE for ear, mouth and throat problems  RESP: NEGATIVE for significant cough or SOB  BREAST: NEGATIVE for masses, tenderness or discharge  CV: NEGATIVE for chest pain, palpitations or peripheral  edema  GI: NEGATIVE for nausea, abdominal pain, heartburn, or change in bowel habits  : NEGATIVE for unusual urinary or vaginal symptoms. Periods are regular.  MUSCULOSKELETAL: NEGATIVE for significant arthralgias or myalgia  NEURO: NEGATIVE for weakness, dizziness or paresthesias  ENDOCRINE: NEGATIVE for temperature intolerance, skin/hair changes  HEME/ALLERGY/IMMUNE: NEGATIVE for bleeding problems  PSYCHIATRIC: NEGATIVE for changes in mood or affect    PHYSICAL EXAM  Vitals: /72 (BP Location: Left arm, Cuff Size: Adult Regular)   Wt 75.3 kg (166 lb)   LMP 2021 (Within Days)   BMI 29.41 kg/m    BMI= Body mass index is 29.41 kg/m .     GENERAL:  31 year old pleasant pregnant female, alert, cooperative and well groomed.  NECK:  Thyroid without enlargement and nodules.  Lymph nodes not palpable.   LUNGS:  Clear to auscultation.  BREAST:  Symmetrical without lesions or nodes.  Nipples everted.  Areolas symmetric.  No palpable axillary nodes.  HEART:  RRR without murmur.  ABDOMEN: Soft without masses or tenderness.  Well healed scar from  section.  GENITALIA: BUS without tenderness or inflammation.  Perineum without lesions.    VAGINA:  Pink, normal rugae and discharge.  CERVIX:  Long/firm/closed per ultrasound done prior to this visit.  UTERUS: 7 weeks in size  ADNEXA: Without masses or tenderness  RECTAL:  Normal appearance.  Digital exam deferred.  LOWER EXTREMITIES: No edema. No significant varicosities.    ASSESSMENT/PLAN:    IUP at 7w6d    ICD-10-CM    1. Encounter for supervision of other normal pregnancy, first trimester  Z34.81 NEISSERIA GONORRHOEA PCR     CHLAMYDIA TRACHOMATIS PCR     CANCELED: NEISSERIA GONORRHOEA PCR     CANCELED: CHLAMYDIA TRACHOMATIS PCR   2. Prenatal care, subsequent pregnancy, unspecified trimester  Z34.80 ABO/Rh type and screen     Hepatitis B surface antigen     CBC with platelets     HIV Antigen Antibody Combo     Rubella Antibody IgG Quantitative      Treponema Abs w Reflex to RPR and Titer     Urine Culture Aerobic Bacterial     Hepatitis C antibody        consult for US for AMA patients: NA  Genetic Testing reviewed and discussed, patient desires no additional genetic testing at this time.. Handout provided      COUNSELING    Instructed on use of triage nurse line and contacting the on call MD after hours in an emergency.     Symptoms of N&V and fatigue usually start to resolve around 12-16 weeks     Reviewed clinic philosophy, call schedule for labor and delivery, and [facility] for delivery    1st OB handout given outlining appointment spacing and MD information    Reviewed exercise and nutrition    Recommend to gain 25-35 pounds with her pregnancy.    Discussed OTC medications. OB med list given    Encouraged patient to arrange  if needed    Encouraged patient to take PNV's/DHA    Travel precautions discussed, no air travel after 36 weeks and Zika Virus discussed    Will call patient with lab results when available    F/U to be addressed next visit:  , Rx's given, referrals    Will return to the clinic in 4 weeks for her next routine prenatal check.  Will call to be seen sooner if problems arise.    SHRUTHI Bronson, CNM

## 2021-10-27 NOTE — PATIENT INSTRUCTIONS
Thank you for coming to see the Midwives at the   [facility]      We will notify you about your labs that were drawn today once we get the results back or if you have [employer] they will be posted there as well      We will call you personally with results that require further discussion      If any referrals were ordered today you should be getting a call in the next week or you may need to call the number listed with your referral to schedule.            If you need any refills of medications please call your pharmacy and they will contact us      If you have any questions or concerns before your next visit, you can reach the nurse midwife on call by calling the clinic number at [phone].      If you  wish to schedule another appointment, please call our office at [phone]. You can also make appointments through [employer]        If you have a medical emergency please call [phone].    Because you are pregnant, we have additional resources for you:      You may call our consulting RN's during normal business hours for non-urgent questions about your pregnancy.      After hours you may also page the midwife on call for urgent questions or issues by calling the triage line at [phone].  There is always a midwife on call 24 hours a day.    Prenatal Reminders:    Before 14 weeks: Dating ultrasound, Genetic testing       This ultrasound helps us determine your dates accurately. Verifi can be drawn anytime after 10 weeks of gestation  16 weeks: Optional genetic testing (quad screen) or single AFP       This testing helps understand your baby's risk for some genetic abnormalities.  20 weeks:  Screening ultrasound (fetal survey)       This testing will look for early growth abnormalities, and may tell the baby's sex if you wish to find out.  28 weeks: One hour sugar test (GCT), hemoglobin and platelets       This test helps identify diabetes of pregnancy or gestational diabetes.  We also look      at the iron in  your blood and how well your blood clots.  28 - 36 weeks: Tetanus shot (Tdap)       This shot helps protect you and your baby from tetanus and whooping cough.  36 weeks and later: Group B Strep test (GBS)       This test helps predict if you need antibiotics in labor to prevent infection in your baby.  Anytime September to April:  Flu shot       This shot helps protect you and your family from the flu.  This is especially important during pregnancy        Any time during or after your pregnancy you may experience increased depression and/or mood changes.    We are here to support you. Please contact us if you are:    Feeling anxious    Overwhelmed or sad     Trouble sleeping    Crying uncontrollably    Trouble caring for yourself or baby.    The typical schedule after your first visit today you can expect:     Visit 2 - 12-16 weeks  Visit 3 - 20 weeks  Visit 4 - 24 weeks  Visit 5 - 28 weeks  Visit 6 - 32 weeks  Visit 7 - 34 weeks  Visit 8 - 36 weeks  Weekly after 36 weeks until delivery.    If anything comes up between your visits or you have concerns please don't hesitate to contact us.    Secure access to your medical record:  Use [employer] (secure email communication and access to your chart) to send your primary care provider a message or make an appointment. Ask someone on your Team how to sign up for [employer]. To log on to [employer] or for more information in [employer] please visit the website at [email or web address].       Certified Nurse Midwife (CNM) Team  SHRUTHI Bronson, EUFEMIA Urban, SHRUTHI, SHRUTHI Cotter, EUFEMIA Burrows, SHRUTHI, EUFEMIA Saavedrajorosa m, APRN, EUFEMIA Cobb, APRN, CNM    Again, thank you for choosing the midwives at [facility].  We are excited to be a part of your pregnancy. Please let us know how we can best partner with you to improve your and your family's health.

## 2021-10-27 NOTE — NURSING NOTE
"Chief Complaint   Patient presents with     Prenatal Care     NPN 10w 6d no concerns       Initial /72 (BP Location: Left arm, Cuff Size: Adult Regular)   Wt 75.3 kg (166 lb)   LMP 2021 (Within Days)   BMI 29.41 kg/m   Estimated body mass index is 29.41 kg/m  as calculated from the following:    Height as of 19: 1.6 m (5' 3\").    Weight as of this encounter: 75.3 kg (166 lb).  BP completed using cuff size: regular    Questioned patient about current smoking habits.  Pt. has never smoked.          The following HM Due: GC/C      "

## 2021-10-28 LAB
BACTERIA UR CULT: NO GROWTH
C TRACH DNA SPEC QL NAA+PROBE: NEGATIVE
HBV SURFACE AG SERPL QL IA: NONREACTIVE
HCV AB SERPL QL IA: NONREACTIVE
HIV 1+2 AB+HIV1 P24 AG SERPL QL IA: NONREACTIVE
N GONORRHOEA DNA SPEC QL NAA+PROBE: NEGATIVE
RUBV IGG SERPL QL IA: 2.48 INDEX
RUBV IGG SERPL QL IA: POSITIVE
T PALLIDUM AB SER QL: NONREACTIVE

## 2021-11-23 ENCOUNTER — PRENATAL OFFICE VISIT (OUTPATIENT)
Dept: OBGYN | Facility: CLINIC | Age: 31
End: 2021-11-23
Payer: COMMERCIAL

## 2021-11-23 VITALS — DIASTOLIC BLOOD PRESSURE: 74 MMHG | BODY MASS INDEX: 29.23 KG/M2 | WEIGHT: 165 LBS | SYSTOLIC BLOOD PRESSURE: 124 MMHG

## 2021-11-23 DIAGNOSIS — Z34.80 PRENATAL CARE, SUBSEQUENT PREGNANCY, UNSPECIFIED TRIMESTER: Primary | ICD-10-CM

## 2021-11-23 PROCEDURE — 99207 PR PRENATAL VISIT: CPT | Performed by: OBSTETRICS & GYNECOLOGY

## 2021-12-21 ENCOUNTER — PRENATAL OFFICE VISIT (OUTPATIENT)
Dept: OBGYN | Facility: CLINIC | Age: 31
End: 2021-12-21
Payer: COMMERCIAL

## 2021-12-21 VITALS
HEIGHT: 63 IN | WEIGHT: 166.6 LBS | DIASTOLIC BLOOD PRESSURE: 70 MMHG | BODY MASS INDEX: 29.52 KG/M2 | SYSTOLIC BLOOD PRESSURE: 116 MMHG

## 2021-12-21 DIAGNOSIS — Z34.80 PRENATAL CARE, SUBSEQUENT PREGNANCY, UNSPECIFIED TRIMESTER: Primary | ICD-10-CM

## 2021-12-21 DIAGNOSIS — Z98.891 PREVIOUS CESAREAN SECTION: ICD-10-CM

## 2021-12-21 PROCEDURE — 99207 PR PRENATAL VISIT: CPT | Performed by: OBSTETRICS & GYNECOLOGY

## 2021-12-21 ASSESSMENT — MIFFLIN-ST. JEOR: SCORE: 1444.82

## 2021-12-21 NOTE — NURSING NOTE
"15w5d    Chief Complaint   Patient presents with     Prenatal Care       Initial /70   Ht 1.6 m (5' 3\")   Wt 75.6 kg (166 lb 9.6 oz)   LMP 2021 (Within Days)   BMI 29.51 kg/m   Estimated body mass index is 29.51 kg/m  as calculated from the following:    Height as of this encounter: 1.6 m (5' 3\").    Weight as of this encounter: 75.6 kg (166 lb 9.6 oz).  BP completed using cuff size: regular    Questioned patient about current smoking habits.  Pt. has never smoked.          The following HM Due: NONE             "

## 2021-12-21 NOTE — PROGRESS NOTES
32 yo  at 15w5d here for rouitne visit.  Feels well.  No FM yet. Plans RCS which will be scheduled following U/S on 22.  RTC 4 weeks

## 2022-01-18 ENCOUNTER — ANCILLARY PROCEDURE (OUTPATIENT)
Dept: ULTRASOUND IMAGING | Facility: CLINIC | Age: 32
End: 2022-01-18
Attending: OBSTETRICS & GYNECOLOGY
Payer: COMMERCIAL

## 2022-01-18 ENCOUNTER — PRENATAL OFFICE VISIT (OUTPATIENT)
Dept: OBGYN | Facility: CLINIC | Age: 32
End: 2022-01-18
Payer: COMMERCIAL

## 2022-01-18 VITALS
WEIGHT: 168 LBS | DIASTOLIC BLOOD PRESSURE: 70 MMHG | BODY MASS INDEX: 29.77 KG/M2 | HEIGHT: 63 IN | SYSTOLIC BLOOD PRESSURE: 110 MMHG

## 2022-01-18 DIAGNOSIS — Z34.80 PRENATAL CARE, SUBSEQUENT PREGNANCY, UNSPECIFIED TRIMESTER: Primary | ICD-10-CM

## 2022-01-18 DIAGNOSIS — Z34.80 PRENATAL CARE, SUBSEQUENT PREGNANCY, UNSPECIFIED TRIMESTER: ICD-10-CM

## 2022-01-18 PROCEDURE — 76805 OB US >/= 14 WKS SNGL FETUS: CPT | Performed by: OBSTETRICS & GYNECOLOGY

## 2022-01-18 PROCEDURE — 99207 PR PRENATAL VISIT: CPT | Performed by: OBSTETRICS & GYNECOLOGY

## 2022-01-18 ASSESSMENT — MIFFLIN-ST. JEOR: SCORE: 1451.17

## 2022-01-18 NOTE — NURSING NOTE
"Chief Complaint   Patient presents with     Prenatal Care     19 5/7 weeks       Initial /70 (BP Location: Left arm, Patient Position: Chair, Cuff Size: Adult Regular)   Ht 1.6 m (5' 3\")   Wt 76.2 kg (168 lb)   LMP 2021 (Within Days)   Breastfeeding No   BMI 29.76 kg/m   Estimated body mass index is 29.76 kg/m  as calculated from the following:    Height as of this encounter: 1.6 m (5' 3\").    Weight as of this encounter: 76.2 kg (168 lb).  BP completed using cuff size: regular    Questioned patient about current smoking habits.  Pt. has never smoked.          The following HM Due: NONE  +flutters    Jennifer Mcgarry CMA    "

## 2022-01-18 NOTE — PROGRESS NOTES
31 year old  at 19w5d     Anatomy US today.  Have gender in an envelope.    Addressed questions related to covid vaccination, encouraged her to get vaccinated.  Plans RLTCS, schedule at next visit  Reviewed quickening    RTC 4 wks     Jacquelyn Leon MD, MPH  [facility]

## 2022-01-19 DIAGNOSIS — Z34.80 PRENATAL CARE, SUBSEQUENT PREGNANCY, UNSPECIFIED TRIMESTER: Primary | ICD-10-CM

## 2022-02-04 ENCOUNTER — ANCILLARY PROCEDURE (OUTPATIENT)
Dept: ULTRASOUND IMAGING | Facility: CLINIC | Age: 32
End: 2022-02-04
Attending: OBSTETRICS & GYNECOLOGY
Payer: COMMERCIAL

## 2022-02-04 DIAGNOSIS — Z34.80 PRENATAL CARE, SUBSEQUENT PREGNANCY, UNSPECIFIED TRIMESTER: ICD-10-CM

## 2022-02-04 PROCEDURE — 76816 OB US FOLLOW-UP PER FETUS: CPT | Performed by: OBSTETRICS & GYNECOLOGY

## 2022-02-15 ENCOUNTER — PREP FOR PROCEDURE (OUTPATIENT)
Dept: OBGYN | Facility: CLINIC | Age: 32
End: 2022-02-15

## 2022-02-15 ENCOUNTER — PRENATAL OFFICE VISIT (OUTPATIENT)
Dept: OBGYN | Facility: CLINIC | Age: 32
End: 2022-02-15
Payer: COMMERCIAL

## 2022-02-15 ENCOUNTER — TELEPHONE (OUTPATIENT)
Dept: OBGYN | Facility: CLINIC | Age: 32
End: 2022-02-15

## 2022-02-15 VITALS — WEIGHT: 176 LBS | DIASTOLIC BLOOD PRESSURE: 68 MMHG | SYSTOLIC BLOOD PRESSURE: 112 MMHG | BODY MASS INDEX: 31.18 KG/M2

## 2022-02-15 DIAGNOSIS — Z34.80 PRENATAL CARE, SUBSEQUENT PREGNANCY, UNSPECIFIED TRIMESTER: Primary | ICD-10-CM

## 2022-02-15 DIAGNOSIS — Z98.891 PREVIOUS CESAREAN SECTION: Primary | ICD-10-CM

## 2022-02-15 DIAGNOSIS — Z98.891 PREVIOUS CESAREAN SECTION: ICD-10-CM

## 2022-02-15 PROCEDURE — 99207 PR PRENATAL VISIT: CPT | Performed by: OBSTETRICS & GYNECOLOGY

## 2022-02-15 NOTE — TELEPHONE ENCOUNTER
Procedure name(s) - multi select  Delivery    Reason for procedure Prior C/S    Surgeon: Justin    Is this a multi surgeon case? No    Laterality N/A    Request for additional equipment Other (see comments) None   Anesthesia Spinal    Positioning (if different from preference card): Supine    Is the patient known to have any of the following? None of the above    Initiate Pre-op orders for above procedure: Yes, as ordered in Epic additional orders noted there also   Location of Case: [facility]    Operating room  requested: No    Urgency of Surgery: Routine    Surgeon Procedure Time (incision to closure) in minutes (per procedure as applicable) 60    Note: Surgical Case Time Needed (in minutes)   Surgery Date: 39-40 weeks 22   Time of Surgery (Requested): 7:30 AM    Patient Class (for admit prior to surgery, specify number of days in comments): Surgery admit admit day of surgery   Length of Stay: 3 days    H&P To Be Completed By: Surgeon    [facility]     needed? No    Post-Op Appointment 6 weeks    Vendor Needed? No

## 2022-02-15 NOTE — TELEPHONE ENCOUNTER
Type of surgery:  delivery  Location of surgery: [facility]  Date and time of surgery: 6/3/22 @ 9:00 am  Surgeon: Dr. Anderson  Pre-Op Appt Date: @ prenatal appointment  Post-Op Appt Date: Patient advised to schedule.   Packet sent out: Yes  Pre-cert/Authorization completed:  No  Date: 2/15/22

## 2022-02-15 NOTE — PROGRESS NOTES
32 yo  at 23w5d.  Prior LTCS.  Will schedule RCS on 22 at 39wks.  No OB concerns.  It's a boy!  RTC 4 weeks with GCT

## 2022-03-15 ENCOUNTER — PRENATAL OFFICE VISIT (OUTPATIENT)
Dept: OBGYN | Facility: CLINIC | Age: 32
End: 2022-03-15
Payer: COMMERCIAL

## 2022-03-15 VITALS — BODY MASS INDEX: 31.89 KG/M2 | DIASTOLIC BLOOD PRESSURE: 70 MMHG | WEIGHT: 180 LBS | SYSTOLIC BLOOD PRESSURE: 106 MMHG

## 2022-03-15 DIAGNOSIS — Z34.80 PRENATAL CARE, SUBSEQUENT PREGNANCY, UNSPECIFIED TRIMESTER: Primary | ICD-10-CM

## 2022-03-15 LAB
ERYTHROCYTE [DISTWIDTH] IN BLOOD BY AUTOMATED COUNT: 12.4 % (ref 10–15)
GLUCOSE 1H P 50 G GLC PO SERPL-MCNC: 125 MG/DL (ref 70–129)
HCT VFR BLD AUTO: 33.8 % (ref 35–47)
HGB BLD-MCNC: 11.5 G/DL (ref 11.7–15.7)
MCH RBC QN AUTO: 31 PG (ref 26.5–33)
MCHC RBC AUTO-ENTMCNC: 34 G/DL (ref 31.5–36.5)
MCV RBC AUTO: 91 FL (ref 78–100)
PLATELET # BLD AUTO: 252 10E3/UL (ref 150–450)
RBC # BLD AUTO: 3.71 10E6/UL (ref 3.8–5.2)
WBC # BLD AUTO: 7.2 10E3/UL (ref 4–11)

## 2022-03-15 PROCEDURE — 99207 PR PRENATAL VISIT: CPT | Performed by: OBSTETRICS & GYNECOLOGY

## 2022-03-15 PROCEDURE — 82950 GLUCOSE TEST: CPT

## 2022-03-15 PROCEDURE — 90715 TDAP VACCINE 7 YRS/> IM: CPT | Performed by: OBSTETRICS & GYNECOLOGY

## 2022-03-15 PROCEDURE — 85027 COMPLETE CBC AUTOMATED: CPT

## 2022-03-15 PROCEDURE — 86780 TREPONEMA PALLIDUM: CPT

## 2022-03-15 PROCEDURE — 36415 COLL VENOUS BLD VENIPUNCTURE: CPT

## 2022-03-15 PROCEDURE — 90471 IMMUNIZATION ADMIN: CPT | Performed by: OBSTETRICS & GYNECOLOGY

## 2022-03-15 NOTE — PROGRESS NOTES
32yo  at 27w5d.  Feels well.  GCT/Hgb/Trep and TdaP today.  Baby active.  Plans RCS on 6/3/22.  RTC 2 weeks

## 2022-03-15 NOTE — NURSING NOTE
"Chief Complaint   Patient presents with     Prenatal Care   27w5d  No concerns  Form for Spousal Leave    initial /70   Wt 81.6 kg (180 lb)   LMP 08/12/2021 (Within Days)   BMI 31.89 kg/m   Estimated body mass index is 31.89 kg/m  as calculated from the following:    Height as of 1/18/22: 1.6 m (5' 3\").    Weight as of this encounter: 81.6 kg (180 lb).  BP completed using cuff size regular.  Franny Quinonez CMA    "

## 2022-03-16 LAB — T PALLIDUM AB SER QL: NONREACTIVE

## 2022-03-30 ENCOUNTER — PRENATAL OFFICE VISIT (OUTPATIENT)
Dept: OBGYN | Facility: CLINIC | Age: 32
End: 2022-03-30
Payer: COMMERCIAL

## 2022-03-30 VITALS
WEIGHT: 185.8 LBS | BODY MASS INDEX: 32.92 KG/M2 | SYSTOLIC BLOOD PRESSURE: 102 MMHG | DIASTOLIC BLOOD PRESSURE: 64 MMHG | HEIGHT: 63 IN

## 2022-03-30 DIAGNOSIS — Z98.891 PREVIOUS CESAREAN SECTION: ICD-10-CM

## 2022-03-30 DIAGNOSIS — Z34.80 PRENATAL CARE, SUBSEQUENT PREGNANCY, UNSPECIFIED TRIMESTER: Primary | ICD-10-CM

## 2022-03-30 PROCEDURE — 99207 PR PRENATAL VISIT: CPT | Performed by: OBSTETRICS & GYNECOLOGY

## 2022-03-30 NOTE — NURSING NOTE
"29w6d    Chief Complaint   Patient presents with     Prenatal Care       Initial /64   Ht 1.6 m (5' 3\")   Wt 84.3 kg (185 lb 12.8 oz)   LMP 2021 (Within Days)   BMI 32.91 kg/m   Estimated body mass index is 32.91 kg/m  as calculated from the following:    Height as of this encounter: 1.6 m (5' 3\").    Weight as of this encounter: 84.3 kg (185 lb 12.8 oz).  BP completed using cuff size: regular    Questioned patient about current smoking habits.  Pt. has never smoked.          The following HM Due: NONE    "

## 2022-03-30 NOTE — PROGRESS NOTES
32yo  at 29w6d here for routine visit.  Feels well.  Passed GCT.  Baby very active.  Working on names but no decision as yet.  RTC 2 weeks

## 2022-04-12 ENCOUNTER — PRENATAL OFFICE VISIT (OUTPATIENT)
Dept: OBGYN | Facility: CLINIC | Age: 32
End: 2022-04-12
Payer: COMMERCIAL

## 2022-04-12 VITALS — WEIGHT: 184.7 LBS | SYSTOLIC BLOOD PRESSURE: 118 MMHG | DIASTOLIC BLOOD PRESSURE: 84 MMHG | BODY MASS INDEX: 32.72 KG/M2

## 2022-04-12 DIAGNOSIS — R11.0 NAUSEA: ICD-10-CM

## 2022-04-12 DIAGNOSIS — Z34.80 PRENATAL CARE, SUBSEQUENT PREGNANCY, UNSPECIFIED TRIMESTER: Primary | ICD-10-CM

## 2022-04-12 DIAGNOSIS — Z98.891 PREVIOUS CESAREAN SECTION: ICD-10-CM

## 2022-04-12 LAB
ERYTHROCYTE [DISTWIDTH] IN BLOOD BY AUTOMATED COUNT: 13.4 % (ref 10–15)
HCT VFR BLD AUTO: 34.8 % (ref 35–47)
HGB BLD-MCNC: 11.9 G/DL (ref 11.7–15.7)
MCH RBC QN AUTO: 30.4 PG (ref 26.5–33)
MCHC RBC AUTO-ENTMCNC: 34.2 G/DL (ref 31.5–36.5)
MCV RBC AUTO: 89 FL (ref 78–100)
PLATELET # BLD AUTO: 256 10E3/UL (ref 150–450)
RBC # BLD AUTO: 3.92 10E6/UL (ref 3.8–5.2)
WBC # BLD AUTO: 6.3 10E3/UL (ref 4–11)

## 2022-04-12 PROCEDURE — 85027 COMPLETE CBC AUTOMATED: CPT | Performed by: OBSTETRICS & GYNECOLOGY

## 2022-04-12 PROCEDURE — 83690 ASSAY OF LIPASE: CPT | Performed by: OBSTETRICS & GYNECOLOGY

## 2022-04-12 PROCEDURE — 36415 COLL VENOUS BLD VENIPUNCTURE: CPT | Performed by: OBSTETRICS & GYNECOLOGY

## 2022-04-12 PROCEDURE — 80053 COMPREHEN METABOLIC PANEL: CPT | Performed by: OBSTETRICS & GYNECOLOGY

## 2022-04-12 PROCEDURE — 99207 PR PRENATAL VISIT: CPT | Performed by: OBSTETRICS & GYNECOLOGY

## 2022-04-12 PROCEDURE — 82150 ASSAY OF AMYLASE: CPT | Performed by: OBSTETRICS & GYNECOLOGY

## 2022-04-12 NOTE — NURSING NOTE
"Chief Complaint   Patient presents with     Prenatal Care     31 weeks 5 days     Pt reports nausea X 4 days  lower back pain         Initial /84 (BP Location: Left arm, Cuff Size: Adult Regular)   Wt 83.8 kg (184 lb 11.2 oz)   LMP 2021 (Within Days)   Breastfeeding No   BMI 32.72 kg/m   Estimated body mass index is 32.72 kg/m  as calculated from the following:    Height as of 3/30/22: 1.6 m (5' 3\").    Weight as of this encounter: 83.8 kg (184 lb 11.2 oz).  BP completed using cuff size: regular    Questioned patient about current smoking habits.  Pt. has never smoked.    31w5d      The following HM Due: NONE      +FM daily   +Nausea , not feeling well   +Fatigued at night   +Lower back pian   +constipated      Loida Godinez, CMA on 2022 at 3:35 PM    "

## 2022-04-12 NOTE — PROGRESS NOTES
32 yo  at 31w5d here for routine visit.  Baby active.  Nausea with 1 episode of diarrhea and anorexia/malaise for 3 days.  Check CBC/CMP/amylase/lipase.  RTC 2 weeks

## 2022-04-13 LAB
ALBUMIN SERPL-MCNC: 2.8 G/DL (ref 3.4–5)
ALP SERPL-CCNC: 105 U/L (ref 40–150)
ALT SERPL W P-5'-P-CCNC: 21 U/L (ref 0–50)
AMYLASE SERPL-CCNC: 39 U/L (ref 30–110)
ANION GAP SERPL CALCULATED.3IONS-SCNC: 9 MMOL/L (ref 3–14)
AST SERPL W P-5'-P-CCNC: 16 U/L (ref 0–45)
BILIRUB SERPL-MCNC: 0.4 MG/DL (ref 0.2–1.3)
BUN SERPL-MCNC: 4 MG/DL (ref 7–30)
CALCIUM SERPL-MCNC: 9 MG/DL (ref 8.5–10.1)
CHLORIDE BLD-SCNC: 105 MMOL/L (ref 94–109)
CO2 SERPL-SCNC: 23 MMOL/L (ref 20–32)
CREAT SERPL-MCNC: 0.57 MG/DL (ref 0.52–1.04)
GFR SERPL CREATININE-BSD FRML MDRD: >90 ML/MIN/1.73M2
GLUCOSE BLD-MCNC: 80 MG/DL (ref 70–99)
LIPASE SERPL-CCNC: 73 U/L (ref 73–393)
POTASSIUM BLD-SCNC: 3.7 MMOL/L (ref 3.4–5.3)
PROT SERPL-MCNC: 6.7 G/DL (ref 6.8–8.8)
SODIUM SERPL-SCNC: 137 MMOL/L (ref 133–144)

## 2022-04-26 ENCOUNTER — PRENATAL OFFICE VISIT (OUTPATIENT)
Dept: OBGYN | Facility: CLINIC | Age: 32
End: 2022-04-26
Payer: COMMERCIAL

## 2022-04-26 VITALS — BODY MASS INDEX: 32.42 KG/M2 | SYSTOLIC BLOOD PRESSURE: 126 MMHG | DIASTOLIC BLOOD PRESSURE: 74 MMHG | WEIGHT: 183 LBS

## 2022-04-26 DIAGNOSIS — Z98.891 PREVIOUS CESAREAN SECTION: ICD-10-CM

## 2022-04-26 DIAGNOSIS — Z34.80 PRENATAL CARE, SUBSEQUENT PREGNANCY, UNSPECIFIED TRIMESTER: Primary | ICD-10-CM

## 2022-04-26 PROCEDURE — 99207 PR PRENATAL VISIT: CPT | Performed by: OBSTETRICS & GYNECOLOGY

## 2022-04-26 NOTE — PROGRESS NOTES
"30yo  at 33w5d here for routine visit.  Feeling better c/w last visit but now a \"cold\" but w/o GI symptoms.  Baby active.  No OB concerns.  RTC 2 weeks  "

## 2022-04-26 NOTE — NURSING NOTE
"Chief Complaint   Patient presents with     Prenatal Care   33w5d  No concerns    initial /74   Wt 83 kg (183 lb)   LMP 08/12/2021 (Within Days)   BMI 32.42 kg/m   Estimated body mass index is 32.42 kg/m  as calculated from the following:    Height as of 3/30/22: 1.6 m (5' 3\").    Weight as of this encounter: 83 kg (183 lb).  BP completed using cuff size regular.  Franny Quinonez CMA    "

## 2022-05-01 ENCOUNTER — HEALTH MAINTENANCE LETTER (OUTPATIENT)
Age: 32
End: 2022-05-01

## 2022-05-10 ENCOUNTER — PRENATAL OFFICE VISIT (OUTPATIENT)
Dept: OBGYN | Facility: CLINIC | Age: 32
End: 2022-05-10
Payer: COMMERCIAL

## 2022-05-10 VITALS — DIASTOLIC BLOOD PRESSURE: 76 MMHG | SYSTOLIC BLOOD PRESSURE: 118 MMHG | WEIGHT: 191.6 LBS | BODY MASS INDEX: 33.94 KG/M2

## 2022-05-10 DIAGNOSIS — Z34.80 PRENATAL CARE, SUBSEQUENT PREGNANCY, UNSPECIFIED TRIMESTER: Primary | ICD-10-CM

## 2022-05-10 DIAGNOSIS — Z98.891 PREVIOUS CESAREAN SECTION: ICD-10-CM

## 2022-05-10 PROCEDURE — 87653 STREP B DNA AMP PROBE: CPT | Performed by: OBSTETRICS & GYNECOLOGY

## 2022-05-10 PROCEDURE — 99207 PR PRENATAL VISIT: CPT | Performed by: OBSTETRICS & GYNECOLOGY

## 2022-05-10 NOTE — PROGRESS NOTES
32yo  at 35w5d.  Baby active.  RCS planned for 6/3/22.  Discussed 2-3 day stay as typical.  GBS done.  RTC 1week

## 2022-05-12 LAB — GP B STREP DNA SPEC QL NAA+PROBE: NEGATIVE

## 2022-05-17 ENCOUNTER — PRENATAL OFFICE VISIT (OUTPATIENT)
Dept: OBGYN | Facility: CLINIC | Age: 32
End: 2022-05-17
Payer: COMMERCIAL

## 2022-05-17 VITALS — WEIGHT: 193.1 LBS | BODY MASS INDEX: 34.21 KG/M2 | DIASTOLIC BLOOD PRESSURE: 80 MMHG | SYSTOLIC BLOOD PRESSURE: 116 MMHG

## 2022-05-17 DIAGNOSIS — Z34.80 PRENATAL CARE, SUBSEQUENT PREGNANCY, UNSPECIFIED TRIMESTER: Primary | ICD-10-CM

## 2022-05-17 DIAGNOSIS — Z98.891 PREVIOUS CESAREAN SECTION: ICD-10-CM

## 2022-05-17 PROCEDURE — 99207 PR PRENATAL VISIT: CPT | Performed by: OBSTETRICS & GYNECOLOGY

## 2022-05-17 NOTE — PROGRESS NOTES
"30yo  at 36w5d.  RCS on 6/3/22.  GBS neg.  Feels warm \"all the time but that's normal for me\".  RTC 1 week.  Discussed L&D.  Pre op next visit.  "

## 2022-05-17 NOTE — NURSING NOTE
"Chief Complaint   Patient presents with     Prenatal Care   36w5d  C/o Rt leg pain    initial Wt 87.6 kg (193 lb 1.6 oz)   LMP 08/12/2021 (Within Days)   BMI 34.21 kg/m   Estimated body mass index is 34.21 kg/m  as calculated from the following:    Height as of 3/30/22: 1.6 m (5' 3\").    Weight as of this encounter: 87.6 kg (193 lb 1.6 oz).  BP completed using cuff size regular.  Franny Quinonez CMA    "

## 2022-05-24 ENCOUNTER — PRENATAL OFFICE VISIT (OUTPATIENT)
Dept: OBGYN | Facility: CLINIC | Age: 32
End: 2022-05-24
Payer: COMMERCIAL

## 2022-05-24 ENCOUNTER — TELEPHONE (OUTPATIENT)
Dept: OBGYN | Facility: CLINIC | Age: 32
End: 2022-05-24

## 2022-05-24 VITALS — BODY MASS INDEX: 34.65 KG/M2 | DIASTOLIC BLOOD PRESSURE: 78 MMHG | SYSTOLIC BLOOD PRESSURE: 116 MMHG | WEIGHT: 195.6 LBS

## 2022-05-24 DIAGNOSIS — Z98.891 PREVIOUS CESAREAN SECTION: ICD-10-CM

## 2022-05-24 DIAGNOSIS — Z34.80 PRENATAL CARE, SUBSEQUENT PREGNANCY, UNSPECIFIED TRIMESTER: Primary | ICD-10-CM

## 2022-05-24 PROCEDURE — 99207 PR PRENATAL VISIT: CPT | Performed by: OBSTETRICS & GYNECOLOGY

## 2022-05-24 NOTE — PROGRESS NOTES
32 yo  at 37w5d.  RCS on 6/3/22.  Low abdominal pressure.  No ctx.  Baby active.  Reviewed visitor policy during hospitalization.        History and Physical  Obstetrics and Gynecology     Date of Admission:  6/3/22    Assessment & Plan   Aliya Yip is a 31 year old female who is scheduled for Repeat  section.     ASSESSMENT:   IUP @ 37w5d today  Hx of LST C/C for 9lb+ infant      PLAN:   Scheduled for  section.    The risks, benefits, complications, treatment options, non-operative alternatives were discussed with the patient. Risks include but are not limited to infection, bleeding possibly requiring blood transfusion, injury to surrounding organs such as uterus/ovaries/bladder/bowel/nerves/ blood vessels with possible need for further surgery/procedure, injury to baby, VTE/PE, and remote risk of maternal/fetal death. The patient concurred with the proposed plan, giving informed consent.     All questions were answered.     Tariq Anderson MD    History of Present Illness   Aliya Yip is a 31 year old female  37w5d planning for Repeat  section.      PRENATAL COURSE  Prenatal course was essentially uncomplicated      Recent Labs   Lab Test 10/27/21  1018 19  0040   ABO  --  O   RH  --  Pos   AS Negative Neg     Rhogam not indicated   Recent Labs   Lab Test 10/27/21  1018 10/28/19  1425   HEPBANG Nonreactive  --    HIAGAB Nonreactive  --    GBS  --  Negative   RUQIGG Positive*  --          Prior to Admission Medications   Cannot display prior to admission medications because the patient has not been admitted in this contact.     Allergies   No Known Allergies      Immunization History   Immunization History   Administered Date(s) Administered     Influenza (IIV3) PF 2013     Influenza Vaccine IM > 6 months Valent IIV4 (Alfuria,Fluzone) 10/16/2019, 2021     TDAP Vaccine (Adacel) 2019     Tdap (Adacel,Boostrix) 03/15/2022       Past Medical  History:   Diagnosis Date     Genital herpes      UTI (urinary tract infection)        Past Surgical History:   Procedure Laterality Date      SECTION N/A 2019    Procedure:  SECTION;  Surgeon: Tariq Anderson MD;  Location:  OR       Clinic vitals from today: /78   Wt 88.7 kg (195 lb 9.6 oz)   LMP 2021 (Within Days)   BMI 34.65 kg/m      Abdomen: gravid, single vertex fetus, non-tender, EFW 8 lbs   Constitutional: healthy, alert, active and no distress   Extremities: NT, no edema  Neurologic: Awake, alert, oriented x3  Neuropsychiatric: General: normal, calm and normal eye contact  Heart: Regular rate and rhythm  Lungs: clear to ausculation bilaterally    Tariq Anderson MD

## 2022-05-24 NOTE — NURSING NOTE
"Chief Complaint   Patient presents with     Pre-Op Exam     Prenatal Care   37w5d  C/o pelvic pressure    initial /78   Wt 88.7 kg (195 lb 9.6 oz)   LMP 08/12/2021 (Within Days)   BMI 34.65 kg/m   Estimated body mass index is 34.65 kg/m  as calculated from the following:    Height as of 3/30/22: 1.6 m (5' 3\").    Weight as of this encounter: 88.7 kg (195 lb 9.6 oz).  BP completed using cuff size regular.  Franny Quinonez CMA    "

## 2022-05-31 ENCOUNTER — PRENATAL OFFICE VISIT (OUTPATIENT)
Dept: OBGYN | Facility: CLINIC | Age: 32
End: 2022-05-31
Payer: COMMERCIAL

## 2022-05-31 VITALS — SYSTOLIC BLOOD PRESSURE: 118 MMHG | BODY MASS INDEX: 34.54 KG/M2 | WEIGHT: 195 LBS | DIASTOLIC BLOOD PRESSURE: 78 MMHG

## 2022-05-31 DIAGNOSIS — Z98.891 PREVIOUS CESAREAN SECTION: ICD-10-CM

## 2022-05-31 DIAGNOSIS — Z01.812 PRE-OPERATIVE LABORATORY EXAMINATION: ICD-10-CM

## 2022-05-31 DIAGNOSIS — Z34.80 PRENATAL CARE, SUBSEQUENT PREGNANCY, UNSPECIFIED TRIMESTER: Primary | ICD-10-CM

## 2022-05-31 PROCEDURE — 99207 PR PRENATAL VISIT: CPT | Performed by: OBSTETRICS & GYNECOLOGY

## 2022-05-31 PROCEDURE — U0005 INFEC AGEN DETEC AMPLI PROBE: HCPCS | Performed by: OBSTETRICS & GYNECOLOGY

## 2022-05-31 PROCEDURE — U0003 INFECTIOUS AGENT DETECTION BY NUCLEIC ACID (DNA OR RNA); SEVERE ACUTE RESPIRATORY SYNDROME CORONAVIRUS 2 (SARS-COV-2) (CORONAVIRUS DISEASE [COVID-19]), AMPLIFIED PROBE TECHNIQUE, MAKING USE OF HIGH THROUGHPUT TECHNOLOGIES AS DESCRIBED BY CMS-2020-01-R: HCPCS | Performed by: OBSTETRICS & GYNECOLOGY

## 2022-05-31 NOTE — NURSING NOTE
"Chief Complaint   Patient presents with     Prenatal Care     38 weeks 5 days- no concerns        Initial /78 (BP Location: Right arm, Patient Position: Sitting, Cuff Size: Adult Regular)   Wt 88.5 kg (195 lb)   LMP 2021 (Within Days)   BMI 34.54 kg/m   Estimated body mass index is 34.54 kg/m  as calculated from the following:    Height as of 3/30/22: 1.6 m (5' 3\").    Weight as of this encounter: 88.5 kg (195 lb).  BP completed using cuff size: regular    Questioned patient about current smoking habits.  Pt. has never smoked.          The following HM Due: NONE    38w5d  Daniel Hoyos CMA                "

## 2022-05-31 NOTE — PROGRESS NOTES
30yo  at 38w5d with RCS scheduled on 6/3.  Surgery and hospital stay reviewed.  No OB concerns.  COVID swab today.

## 2022-06-01 LAB — SARS-COV-2 RNA RESP QL NAA+PROBE: NEGATIVE

## 2022-06-02 ENCOUNTER — LAB (OUTPATIENT)
Dept: LAB | Facility: CLINIC | Age: 32
End: 2022-06-02
Payer: COMMERCIAL

## 2022-06-02 ENCOUNTER — ANESTHESIA EVENT (OUTPATIENT)
Dept: OBGYN | Facility: CLINIC | Age: 32
End: 2022-06-02
Payer: COMMERCIAL

## 2022-06-02 DIAGNOSIS — Z01.812 PRE-OPERATIVE LABORATORY EXAMINATION: ICD-10-CM

## 2022-06-02 LAB
ABO/RH(D): NORMAL
ANTIBODY SCREEN: NEGATIVE
HGB BLD-MCNC: 11.6 G/DL (ref 11.7–15.7)
SPECIMEN EXPIRATION DATE: NORMAL

## 2022-06-02 PROCEDURE — 85018 HEMOGLOBIN: CPT

## 2022-06-02 PROCEDURE — 86850 RBC ANTIBODY SCREEN: CPT

## 2022-06-02 PROCEDURE — 86780 TREPONEMA PALLIDUM: CPT

## 2022-06-02 PROCEDURE — 36415 COLL VENOUS BLD VENIPUNCTURE: CPT

## 2022-06-03 ENCOUNTER — ANESTHESIA (OUTPATIENT)
Dept: OBGYN | Facility: CLINIC | Age: 32
End: 2022-06-03
Payer: COMMERCIAL

## 2022-06-03 ENCOUNTER — HOSPITAL ENCOUNTER (INPATIENT)
Facility: CLINIC | Age: 32
LOS: 2 days | Discharge: HOME HEALTH | End: 2022-06-05
Attending: OBSTETRICS & GYNECOLOGY | Admitting: OBSTETRICS & GYNECOLOGY
Payer: COMMERCIAL

## 2022-06-03 DIAGNOSIS — Z98.891 STATUS POST REPEAT LOW TRANSVERSE CESAREAN SECTION: ICD-10-CM

## 2022-06-03 LAB — T PALLIDUM AB SER QL: NONREACTIVE

## 2022-06-03 PROCEDURE — 272N000001 HC OR GENERAL SUPPLY STERILE: Performed by: OBSTETRICS & GYNECOLOGY

## 2022-06-03 PROCEDURE — 250N000011 HC RX IP 250 OP 636: Performed by: OBSTETRICS & GYNECOLOGY

## 2022-06-03 PROCEDURE — 120N000001 HC R&B MED SURG/OB

## 2022-06-03 PROCEDURE — 370N000017 HC ANESTHESIA TECHNICAL FEE, PER MIN: Performed by: OBSTETRICS & GYNECOLOGY

## 2022-06-03 PROCEDURE — 59510 CESAREAN DELIVERY: CPT | Performed by: OBSTETRICS & GYNECOLOGY

## 2022-06-03 PROCEDURE — 360N000076 HC SURGERY LEVEL 3, PER MIN: Performed by: OBSTETRICS & GYNECOLOGY

## 2022-06-03 PROCEDURE — 250N000009 HC RX 250: Performed by: OBSTETRICS & GYNECOLOGY

## 2022-06-03 PROCEDURE — 250N000011 HC RX IP 250 OP 636: Performed by: NURSE ANESTHETIST, CERTIFIED REGISTERED

## 2022-06-03 PROCEDURE — 258N000003 HC RX IP 258 OP 636: Performed by: NURSE ANESTHETIST, CERTIFIED REGISTERED

## 2022-06-03 PROCEDURE — 710N000009 HC RECOVERY PHASE 1, LEVEL 1, PER MIN: Performed by: OBSTETRICS & GYNECOLOGY

## 2022-06-03 PROCEDURE — 250N000011 HC RX IP 250 OP 636: Performed by: ANESTHESIOLOGY

## 2022-06-03 PROCEDURE — 250N000013 HC RX MED GY IP 250 OP 250 PS 637: Performed by: OBSTETRICS & GYNECOLOGY

## 2022-06-03 PROCEDURE — 258N000003 HC RX IP 258 OP 636: Performed by: OBSTETRICS & GYNECOLOGY

## 2022-06-03 RX ORDER — MAGNESIUM HYDROXIDE 1200 MG/15ML
LIQUID ORAL PRN
Status: DISCONTINUED | OUTPATIENT
Start: 2022-06-03 | End: 2022-06-05 | Stop reason: HOSPADM

## 2022-06-03 RX ORDER — METOCLOPRAMIDE HYDROCHLORIDE 5 MG/ML
10 INJECTION INTRAMUSCULAR; INTRAVENOUS EVERY 6 HOURS PRN
Status: DISCONTINUED | OUTPATIENT
Start: 2022-06-03 | End: 2022-06-05 | Stop reason: HOSPADM

## 2022-06-03 RX ORDER — SCOLOPAMINE TRANSDERMAL SYSTEM 1 MG/1
1 PATCH, EXTENDED RELEASE TRANSDERMAL ONCE
Status: CANCELLED | OUTPATIENT
Start: 2022-06-03 | End: 2022-06-03

## 2022-06-03 RX ORDER — METHYLERGONOVINE MALEATE 0.2 MG/ML
200 INJECTION INTRAVENOUS
Status: DISCONTINUED | OUTPATIENT
Start: 2022-06-03 | End: 2022-06-03

## 2022-06-03 RX ORDER — CITRIC ACID/SODIUM CITRATE 334-500MG
30 SOLUTION, ORAL ORAL
Status: COMPLETED | OUTPATIENT
Start: 2022-06-03 | End: 2022-06-03

## 2022-06-03 RX ORDER — TRANEXAMIC ACID 10 MG/ML
1 INJECTION, SOLUTION INTRAVENOUS EVERY 30 MIN PRN
Status: DISCONTINUED | OUTPATIENT
Start: 2022-06-03 | End: 2022-06-05 | Stop reason: HOSPADM

## 2022-06-03 RX ORDER — CEFAZOLIN SODIUM/WATER 2 G/20 ML
2 SYRINGE (ML) INTRAVENOUS
Status: DISCONTINUED | OUTPATIENT
Start: 2022-06-03 | End: 2022-06-03

## 2022-06-03 RX ORDER — LIDOCAINE 40 MG/G
CREAM TOPICAL
Status: DISCONTINUED | OUTPATIENT
Start: 2022-06-03 | End: 2022-06-05 | Stop reason: HOSPADM

## 2022-06-03 RX ORDER — ONDANSETRON 2 MG/ML
INJECTION INTRAMUSCULAR; INTRAVENOUS PRN
Status: DISCONTINUED | OUTPATIENT
Start: 2022-06-03 | End: 2022-06-03

## 2022-06-03 RX ORDER — OXYTOCIN/0.9 % SODIUM CHLORIDE 30/500 ML
100-340 PLASTIC BAG, INJECTION (ML) INTRAVENOUS CONTINUOUS PRN
Status: DISCONTINUED | OUTPATIENT
Start: 2022-06-03 | End: 2022-06-03

## 2022-06-03 RX ORDER — NALOXONE HYDROCHLORIDE 0.4 MG/ML
0.4 INJECTION, SOLUTION INTRAMUSCULAR; INTRAVENOUS; SUBCUTANEOUS
Status: DISCONTINUED | OUTPATIENT
Start: 2022-06-03 | End: 2022-06-05 | Stop reason: HOSPADM

## 2022-06-03 RX ORDER — PROCHLORPERAZINE MALEATE 10 MG
10 TABLET ORAL EVERY 6 HOURS PRN
Status: DISCONTINUED | OUTPATIENT
Start: 2022-06-03 | End: 2022-06-05 | Stop reason: HOSPADM

## 2022-06-03 RX ORDER — OXYTOCIN/0.9 % SODIUM CHLORIDE 30/500 ML
340 PLASTIC BAG, INJECTION (ML) INTRAVENOUS CONTINUOUS PRN
Status: COMPLETED | OUTPATIENT
Start: 2022-06-03 | End: 2022-06-03

## 2022-06-03 RX ORDER — OXYTOCIN 10 [USP'U]/ML
10 INJECTION, SOLUTION INTRAMUSCULAR; INTRAVENOUS
Status: DISCONTINUED | OUTPATIENT
Start: 2022-06-03 | End: 2022-06-03

## 2022-06-03 RX ORDER — TRANEXAMIC ACID 10 MG/ML
1 INJECTION, SOLUTION INTRAVENOUS EVERY 30 MIN PRN
Status: DISCONTINUED | OUTPATIENT
Start: 2022-06-03 | End: 2022-06-03

## 2022-06-03 RX ORDER — DEXTROSE, SODIUM CHLORIDE, SODIUM LACTATE, POTASSIUM CHLORIDE, AND CALCIUM CHLORIDE 5; .6; .31; .03; .02 G/100ML; G/100ML; G/100ML; G/100ML; G/100ML
INJECTION, SOLUTION INTRAVENOUS CONTINUOUS
Status: DISCONTINUED | OUTPATIENT
Start: 2022-06-03 | End: 2022-06-05 | Stop reason: HOSPADM

## 2022-06-03 RX ORDER — PHENYLEPHRINE HYDROCHLORIDE 10 MG/ML
INJECTION INTRAVENOUS PRN
Status: DISCONTINUED | OUTPATIENT
Start: 2022-06-03 | End: 2022-06-03

## 2022-06-03 RX ORDER — OXYCODONE HYDROCHLORIDE 5 MG/1
5 TABLET ORAL EVERY 4 HOURS PRN
Status: DISCONTINUED | OUTPATIENT
Start: 2022-06-03 | End: 2022-06-05 | Stop reason: HOSPADM

## 2022-06-03 RX ORDER — HYDROCORTISONE 25 MG/G
CREAM TOPICAL 3 TIMES DAILY PRN
Status: DISCONTINUED | OUTPATIENT
Start: 2022-06-03 | End: 2022-06-05 | Stop reason: HOSPADM

## 2022-06-03 RX ORDER — OXYTOCIN/0.9 % SODIUM CHLORIDE 30/500 ML
340 PLASTIC BAG, INJECTION (ML) INTRAVENOUS CONTINUOUS PRN
Status: DISCONTINUED | OUTPATIENT
Start: 2022-06-03 | End: 2022-06-05 | Stop reason: HOSPADM

## 2022-06-03 RX ORDER — LIDOCAINE 40 MG/G
CREAM TOPICAL
Status: DISCONTINUED | OUTPATIENT
Start: 2022-06-03 | End: 2022-06-03

## 2022-06-03 RX ORDER — AMOXICILLIN 250 MG
1 CAPSULE ORAL 2 TIMES DAILY
Status: DISCONTINUED | OUTPATIENT
Start: 2022-06-03 | End: 2022-06-05 | Stop reason: HOSPADM

## 2022-06-03 RX ORDER — MISOPROSTOL 200 UG/1
800 TABLET ORAL
Status: DISCONTINUED | OUTPATIENT
Start: 2022-06-03 | End: 2022-06-03

## 2022-06-03 RX ORDER — CARBOPROST TROMETHAMINE 250 UG/ML
250 INJECTION, SOLUTION INTRAMUSCULAR
Status: DISCONTINUED | OUTPATIENT
Start: 2022-06-03 | End: 2022-06-03

## 2022-06-03 RX ORDER — MISOPROSTOL 200 UG/1
800 TABLET ORAL
Status: DISCONTINUED | OUTPATIENT
Start: 2022-06-03 | End: 2022-06-05 | Stop reason: HOSPADM

## 2022-06-03 RX ORDER — NALBUPHINE HYDROCHLORIDE 10 MG/ML
5 INJECTION, SOLUTION INTRAMUSCULAR; INTRAVENOUS; SUBCUTANEOUS EVERY 4 HOURS PRN
Status: DISCONTINUED | OUTPATIENT
Start: 2022-06-03 | End: 2022-06-05 | Stop reason: HOSPADM

## 2022-06-03 RX ORDER — NALOXONE HYDROCHLORIDE 0.4 MG/ML
0.2 INJECTION, SOLUTION INTRAMUSCULAR; INTRAVENOUS; SUBCUTANEOUS
Status: DISCONTINUED | OUTPATIENT
Start: 2022-06-03 | End: 2022-06-05 | Stop reason: HOSPADM

## 2022-06-03 RX ORDER — ONDANSETRON 4 MG/1
4 TABLET, ORALLY DISINTEGRATING ORAL EVERY 6 HOURS PRN
Status: DISCONTINUED | OUTPATIENT
Start: 2022-06-03 | End: 2022-06-05 | Stop reason: HOSPADM

## 2022-06-03 RX ORDER — ACETAMINOPHEN 325 MG/1
975 TABLET ORAL EVERY 6 HOURS
Status: DISCONTINUED | OUTPATIENT
Start: 2022-06-03 | End: 2022-06-05 | Stop reason: HOSPADM

## 2022-06-03 RX ORDER — KETOROLAC TROMETHAMINE 30 MG/ML
INJECTION, SOLUTION INTRAMUSCULAR; INTRAVENOUS PRN
Status: DISCONTINUED | OUTPATIENT
Start: 2022-06-03 | End: 2022-06-03

## 2022-06-03 RX ORDER — MISOPROSTOL 200 UG/1
400 TABLET ORAL
Status: DISCONTINUED | OUTPATIENT
Start: 2022-06-03 | End: 2022-06-03

## 2022-06-03 RX ORDER — METOCLOPRAMIDE 10 MG/1
10 TABLET ORAL EVERY 6 HOURS PRN
Status: DISCONTINUED | OUTPATIENT
Start: 2022-06-03 | End: 2022-06-05 | Stop reason: HOSPADM

## 2022-06-03 RX ORDER — SODIUM CHLORIDE, SODIUM LACTATE, POTASSIUM CHLORIDE, CALCIUM CHLORIDE 600; 310; 30; 20 MG/100ML; MG/100ML; MG/100ML; MG/100ML
INJECTION, SOLUTION INTRAVENOUS CONTINUOUS
Status: DISCONTINUED | OUTPATIENT
Start: 2022-06-03 | End: 2022-06-03

## 2022-06-03 RX ORDER — AMOXICILLIN 250 MG
2 CAPSULE ORAL 2 TIMES DAILY
Status: DISCONTINUED | OUTPATIENT
Start: 2022-06-03 | End: 2022-06-05 | Stop reason: HOSPADM

## 2022-06-03 RX ORDER — ONDANSETRON 2 MG/ML
4 INJECTION INTRAMUSCULAR; INTRAVENOUS EVERY 6 HOURS PRN
Status: DISCONTINUED | OUTPATIENT
Start: 2022-06-03 | End: 2022-06-05 | Stop reason: HOSPADM

## 2022-06-03 RX ORDER — PROCHLORPERAZINE 25 MG
25 SUPPOSITORY, RECTAL RECTAL EVERY 12 HOURS PRN
Status: DISCONTINUED | OUTPATIENT
Start: 2022-06-03 | End: 2022-06-05 | Stop reason: HOSPADM

## 2022-06-03 RX ORDER — OXYTOCIN 10 [USP'U]/ML
10 INJECTION, SOLUTION INTRAMUSCULAR; INTRAVENOUS
Status: DISCONTINUED | OUTPATIENT
Start: 2022-06-03 | End: 2022-06-05 | Stop reason: HOSPADM

## 2022-06-03 RX ORDER — BISACODYL 10 MG
10 SUPPOSITORY, RECTAL RECTAL DAILY PRN
Status: DISCONTINUED | OUTPATIENT
Start: 2022-06-05 | End: 2022-06-05 | Stop reason: HOSPADM

## 2022-06-03 RX ORDER — ACETAMINOPHEN 325 MG/1
975 TABLET ORAL ONCE
Status: COMPLETED | OUTPATIENT
Start: 2022-06-03 | End: 2022-06-03

## 2022-06-03 RX ORDER — CARBOPROST TROMETHAMINE 250 UG/ML
250 INJECTION, SOLUTION INTRAMUSCULAR
Status: DISCONTINUED | OUTPATIENT
Start: 2022-06-03 | End: 2022-06-05 | Stop reason: HOSPADM

## 2022-06-03 RX ORDER — DEXAMETHASONE SODIUM PHOSPHATE 4 MG/ML
INJECTION, SOLUTION INTRA-ARTICULAR; INTRALESIONAL; INTRAMUSCULAR; INTRAVENOUS; SOFT TISSUE PRN
Status: DISCONTINUED | OUTPATIENT
Start: 2022-06-03 | End: 2022-06-03

## 2022-06-03 RX ORDER — MORPHINE SULFATE 1 MG/ML
INJECTION, SOLUTION EPIDURAL; INTRATHECAL; INTRAVENOUS PRN
Status: DISCONTINUED | OUTPATIENT
Start: 2022-06-03 | End: 2022-06-03

## 2022-06-03 RX ORDER — BUPIVACAINE HYDROCHLORIDE 7.5 MG/ML
INJECTION, SOLUTION INTRASPINAL PRN
Status: DISCONTINUED | OUTPATIENT
Start: 2022-06-03 | End: 2022-06-03

## 2022-06-03 RX ORDER — METHYLERGONOVINE MALEATE 0.2 MG/ML
200 INJECTION INTRAVENOUS
Status: DISCONTINUED | OUTPATIENT
Start: 2022-06-03 | End: 2022-06-05 | Stop reason: HOSPADM

## 2022-06-03 RX ORDER — IBUPROFEN 800 MG/1
800 TABLET, FILM COATED ORAL EVERY 6 HOURS
Status: DISCONTINUED | OUTPATIENT
Start: 2022-06-04 | End: 2022-06-05 | Stop reason: HOSPADM

## 2022-06-03 RX ORDER — FENTANYL CITRATE 50 UG/ML
INJECTION, SOLUTION INTRAMUSCULAR; INTRAVENOUS PRN
Status: DISCONTINUED | OUTPATIENT
Start: 2022-06-03 | End: 2022-06-03

## 2022-06-03 RX ORDER — KETOROLAC TROMETHAMINE 30 MG/ML
30 INJECTION, SOLUTION INTRAMUSCULAR; INTRAVENOUS EVERY 6 HOURS
Status: DISCONTINUED | OUTPATIENT
Start: 2022-06-03 | End: 2022-06-04

## 2022-06-03 RX ORDER — SIMETHICONE 80 MG
80 TABLET,CHEWABLE ORAL 4 TIMES DAILY PRN
Status: DISCONTINUED | OUTPATIENT
Start: 2022-06-03 | End: 2022-06-05 | Stop reason: HOSPADM

## 2022-06-03 RX ORDER — CEFAZOLIN SODIUM/WATER 2 G/20 ML
2 SYRINGE (ML) INTRAVENOUS SEE ADMIN INSTRUCTIONS
Status: DISCONTINUED | OUTPATIENT
Start: 2022-06-03 | End: 2022-06-03

## 2022-06-03 RX ORDER — MODIFIED LANOLIN
OINTMENT (GRAM) TOPICAL
Status: DISCONTINUED | OUTPATIENT
Start: 2022-06-03 | End: 2022-06-05 | Stop reason: HOSPADM

## 2022-06-03 RX ORDER — MISOPROSTOL 200 UG/1
400 TABLET ORAL
Status: DISCONTINUED | OUTPATIENT
Start: 2022-06-03 | End: 2022-06-05 | Stop reason: HOSPADM

## 2022-06-03 RX ADMIN — NALBUPHINE HYDROCHLORIDE 5 MG: 10 INJECTION, SOLUTION INTRAMUSCULAR; INTRAVENOUS; SUBCUTANEOUS at 18:17

## 2022-06-03 RX ADMIN — KETOROLAC TROMETHAMINE 30 MG: 30 INJECTION, SOLUTION INTRAMUSCULAR at 16:46

## 2022-06-03 RX ADMIN — ONDANSETRON 4 MG: 2 INJECTION INTRAMUSCULAR; INTRAVENOUS at 09:07

## 2022-06-03 RX ADMIN — NALBUPHINE HYDROCHLORIDE 5 MG: 10 INJECTION, SOLUTION INTRAMUSCULAR; INTRAVENOUS; SUBCUTANEOUS at 22:40

## 2022-06-03 RX ADMIN — SODIUM CITRATE AND CITRIC ACID MONOHYDRATE 30 ML: 500; 334 SOLUTION ORAL at 08:13

## 2022-06-03 RX ADMIN — ACETAMINOPHEN 975 MG: 325 TABLET, FILM COATED ORAL at 14:18

## 2022-06-03 RX ADMIN — ACETAMINOPHEN 975 MG: 325 TABLET, FILM COATED ORAL at 20:20

## 2022-06-03 RX ADMIN — Medication 0.15 MG: at 09:07

## 2022-06-03 RX ADMIN — DEXAMETHASONE SODIUM PHOSPHATE 8 MG: 4 INJECTION, SOLUTION INTRA-ARTICULAR; INTRALESIONAL; INTRAMUSCULAR; INTRAVENOUS; SOFT TISSUE at 09:09

## 2022-06-03 RX ADMIN — KETOROLAC TROMETHAMINE 30 MG: 30 INJECTION, SOLUTION INTRAMUSCULAR at 22:33

## 2022-06-03 RX ADMIN — SENNOSIDES AND DOCUSATE SODIUM 1 TABLET: 50; 8.6 TABLET ORAL at 20:20

## 2022-06-03 RX ADMIN — PHENYLEPHRINE HYDROCHLORIDE 200 MCG: 10 INJECTION INTRAVENOUS at 09:17

## 2022-06-03 RX ADMIN — PHENYLEPHRINE HYDROCHLORIDE 200 MCG: 10 INJECTION INTRAVENOUS at 09:49

## 2022-06-03 RX ADMIN — BUPIVACAINE HYDROCHLORIDE IN DEXTROSE 13.5 MG: 7.5 INJECTION, SOLUTION SUBARACHNOID at 09:07

## 2022-06-03 RX ADMIN — PHENYLEPHRINE HYDROCHLORIDE 200 MCG: 10 INJECTION INTRAVENOUS at 09:36

## 2022-06-03 RX ADMIN — MIDAZOLAM 2 MG: 1 INJECTION INTRAMUSCULAR; INTRAVENOUS at 09:07

## 2022-06-03 RX ADMIN — FENTANYL CITRATE 75 MCG: 50 INJECTION, SOLUTION INTRAMUSCULAR; INTRAVENOUS at 09:40

## 2022-06-03 RX ADMIN — ACETAMINOPHEN 975 MG: 325 TABLET, FILM COATED ORAL at 08:05

## 2022-06-03 RX ADMIN — OXYTOCIN-SODIUM CHLORIDE 0.9% IV SOLN 30 UNIT/500ML 340 ML/HR: 30-0.9/5 SOLUTION at 09:24

## 2022-06-03 RX ADMIN — KETOROLAC TROMETHAMINE 30 MG: 30 INJECTION, SOLUTION INTRAMUSCULAR at 09:42

## 2022-06-03 RX ADMIN — Medication 2 G: at 08:55

## 2022-06-03 RX ADMIN — PHENYLEPHRINE HYDROCHLORIDE 150 MCG: 10 INJECTION INTRAVENOUS at 09:12

## 2022-06-03 RX ADMIN — SODIUM CHLORIDE, POTASSIUM CHLORIDE, SODIUM LACTATE AND CALCIUM CHLORIDE: 600; 310; 30; 20 INJECTION, SOLUTION INTRAVENOUS at 07:50

## 2022-06-03 RX ADMIN — PHENYLEPHRINE HYDROCHLORIDE 250 MCG: 10 INJECTION INTRAVENOUS at 09:07

## 2022-06-03 RX ADMIN — SODIUM CHLORIDE, SODIUM LACTATE, POTASSIUM CHLORIDE, CALCIUM CHLORIDE AND DEXTROSE MONOHYDRATE: 5; 600; 310; 30; 20 INJECTION, SOLUTION INTRAVENOUS at 12:06

## 2022-06-03 RX ADMIN — NALBUPHINE HYDROCHLORIDE 5 MG: 10 INJECTION, SOLUTION INTRAMUSCULAR; INTRAVENOUS; SUBCUTANEOUS at 13:26

## 2022-06-03 RX ADMIN — PHENYLEPHRINE HYDROCHLORIDE 0.3 MCG/KG/MIN: 10 INJECTION INTRAVENOUS at 09:07

## 2022-06-03 RX ADMIN — FENTANYL CITRATE 25 MCG: 50 INJECTION, SOLUTION INTRAMUSCULAR; INTRAVENOUS at 09:50

## 2022-06-03 ASSESSMENT — ACTIVITIES OF DAILY LIVING (ADL)
ADLS_ACUITY_SCORE: 18
ADLS_ACUITY_SCORE: 20
ADLS_ACUITY_SCORE: 18
ADLS_ACUITY_SCORE: 20
ADLS_ACUITY_SCORE: 16

## 2022-06-03 NOTE — PROVIDER NOTIFICATION
06/03/22 1255   Provider Notification   Provider Name/Title Dr. Bowie   Method of Notification Phone   Request Evaluate-Remote   Notification Reason Status Update     MD updated on patient status.  Her sharma is still in place and she has only put out about 100 ml of urine since it was emptied after surgery.  She is still feeling a little weak and isn't sure she would be able to be steady on her feet with assistance.  MD okay with the sharma staying in place for another 2 hours.  Fluid bolus was given before transfer and patient plans to eat a light lunch.  Will update MD as necessary.

## 2022-06-03 NOTE — BRIEF OP NOTE
Brief Op Note  Pre-op Dx: IUP at 39w1d                     Prior C/S, declines TOLAC  Post-op Dx: Same, delivered  Name of Procedure: Repeat LST  Section  Surgeon: Justin  Assistant:  none  Anesthesia:  Spinal  Operative Findings:  1. 8 #, 7 oz male infant with Apgars8 at one minute and 9 at five minutes  2. Normal uterus and adnexa  Drains: Thorne  QBL: 783cc  Dr. Chuck Anderson

## 2022-06-03 NOTE — PLAN OF CARE
Data: Vital signs within normal limits. Postpartum checks within normal limits - see flow record. Patient eating and drinking normally. Patient still has her sharma in place to be removed around 1500 and she will ambulate with assistance after that. No apparent signs of infection. Incision is still covered with dressing. Patient performing self cares and is able to care for infant with assistance  Action: Patient medicated during the shift for pain. See MAR.  Patient stated she was comfortable. Patient education done. See flow record.  Response: Positive attachment behaviors observed with infant. Support persons is present.   Plan: Anticipate discharge on 6/6/2022.

## 2022-06-03 NOTE — ANESTHESIA PREPROCEDURE EVALUATION
Anesthesia Pre-Procedure Evaluation    Patient: Aliya Yip   MRN: [id number] : 1990        Procedure : Procedure(s):  REPEAT  SECTION          Past Medical History:   Diagnosis Date     Genital herpes      PONV (postoperative nausea and vomiting)      UTI (urinary tract infection)       Past Surgical History:   Procedure Laterality Date      SECTION N/A 2019    Procedure:  SECTION;  Surgeon: Tariq Anderson MD;  Location: [facility] OR      No Known Allergies   Social History     Tobacco Use     Smoking status: Never Smoker     Smokeless tobacco: Never Used   Substance Use Topics     Alcohol use: Not Currently     Comment: socially      Wt Readings from Last 1 Encounters:   22 88.5 kg (195 lb)        Anesthesia Evaluation   Pt has had prior anesthetic. Type: Regional and General.    History of anesthetic complications  - PONV.      ROS/MED HX  ENT/Pulmonary:  - neg pulmonary ROS     Neurologic:  - neg neurologic ROS     Cardiovascular:  - neg cardiovascular ROS     METS/Exercise Tolerance:     Hematologic:  - neg hematologic  ROS     Musculoskeletal:  - neg musculoskeletal ROS     GI/Hepatic:  - neg GI/hepatic ROS     Renal/Genitourinary:  - neg Renal ROS     Endo:     (+) Obesity,     Psychiatric/Substance Use:  - neg psychiatric ROS     Infectious Disease:  - neg infectious disease ROS     Malignancy:  - neg malignancy ROS     Other: Comment: At term for repeat C/S     (+) Possibly pregnant, ,         Physical Exam    Airway        Mallampati: II   TM distance: > 3 FB   Neck ROM: full   Mouth opening: > 3 cm    Respiratory Devices and Support         Dental  no notable dental history         Cardiovascular   cardiovascular exam normal       Rhythm and rate: regular and normal     Pulmonary   pulmonary exam normal        breath sounds clear to auscultation       Other findings: Lab Test        06/02/22     04/12/22     03/15/22     10/27/21                        1019          1600          1719          1018          WBC           --          6.3          7.2          6.6           HGB          11.6*        11.9         11.5*        13.0          MCV           --          89           91           89            PLT           --          256          252          244            Lab Test        04/12/22 11/17/19 02/13/18                       1600          0040          1218          NA           137           --          138           POTASSIUM    3.7           --          3.8           CHLORIDE     105           --          104           CO2          23            --          25            BUN          4*            --          7*            CR           0.57         0.80         0.80          ANIONGAP     9             --          9             LINDSEY          9.0           --          9.5           GLC          80            --          85                       OUTSIDE LABS:  CBC:   Lab Results   Component Value Date    WBC 6.3 04/12/2022    WBC 7.2 03/15/2022    HGB 11.6 (L) 06/02/2022    HGB 11.9 04/12/2022    HCT 34.8 (L) 04/12/2022    HCT 33.8 (L) 03/15/2022     04/12/2022     03/15/2022     BMP:   Lab Results   Component Value Date     04/12/2022     02/13/2018    POTASSIUM 3.7 04/12/2022    POTASSIUM 3.8 02/13/2018    CHLORIDE 105 04/12/2022    CHLORIDE 104 02/13/2018    CO2 23 04/12/2022    CO2 25 02/13/2018    BUN 4 (L) 04/12/2022    BUN 7 (L) 02/13/2018    CR 0.57 04/12/2022    CR 0.80 11/17/2019    GLC 80 04/12/2022    GLC 85 02/13/2018     COAGS: No results found for: PTT, INR, FIBR  POC:   Lab Results   Component Value Date     (H) 08/23/2019    HCG Negative 01/04/2013     HEPATIC:   Lab Results   Component Value Date    ALBUMIN 2.8 (L) 04/12/2022    PROTTOTAL 6.7 (L) 04/12/2022    ALT 21 04/12/2022    AST 16 04/12/2022    ALKPHOS 105 04/12/2022    BILITOTAL 0.4 04/12/2022     OTHER:   Lab Results   Component Value Date     LINDSEY 9.0 04/12/2022    MAG 2.0 01/04/2013    LIPASE 73 04/12/2022    AMYLASE 39 04/12/2022    TSH 0.89 08/28/2019       Anesthesia Plan    ASA Status:  2      Anesthesia Type: Spinal.              Consents    Anesthesia Plan(s) and associated risks, benefits, and realistic alternatives discussed. Questions answered and patient/representative(s) expressed understanding.    - Discussed:     - Discussed with:  Patient      - Extended Intubation/Ventilatory Support Discussed: No.      - Patient is DNR/DNI Status: No    Use of blood products discussed: Yes.     - Discussed with: Patient.     - Consented: consented to blood products            Reason for refusal: other.     Postoperative Care    Pain management: IV analgesics, Oral pain medications, intrathecal morphine, Multi-modal analgesia.   PONV prophylaxis: Ondansetron (or other 5HT-3), Dexamethasone or Solumedrol, Scopolamine patch     Comments:                Elfego Weston MD

## 2022-06-03 NOTE — PLAN OF CARE
Patient presents to [facility] for scheduled C/S. VS stable. Positive fetal movement. Reactive NST done. performed. PIV placed. Procedure explained to patient. Questions answered. Patient prepped for surgery.

## 2022-06-03 NOTE — ANESTHESIA POSTPROCEDURE EVALUATION
Patient: Aliya Yip    Procedure: Procedure(s):  REPEAT  SECTION       Anesthesia Type:  Spinal    Note:  Disposition: Inpatient   Postop Pain Control: Uneventful            Sign Out: Well controlled pain   PONV: No   Neuro/Psych: Uneventful            Sign Out: Acceptable/Baseline neuro status   Airway/Respiratory: Uneventful            Sign Out: Acceptable/Baseline resp. status   CV/Hemodynamics: Uneventful            Sign Out: Acceptable CV status   Other NRE: NONE   DID A NON-ROUTINE EVENT OCCUR? No           Last vitals:  Vitals Value Taken Time   /59 22 1044   Temp 97.6  F (36.4  C) 22 0958   Pulse     Resp     SpO2 96 % 22 1052   Vitals shown include unvalidated device data.    Electronically Signed By: Aprana Hardy MD  Mamie 3, 2022  10:58 AM

## 2022-06-03 NOTE — OP NOTE
Section Operative Note  [facility]      Indications: Scheduled, repeat  section  Gestational age: 39w1d    Pre-operative Diagnosis: IUP at 39w1d with prior C/S, declines TOLAC  Post-operative Diagnosis: Same, delivered    Surgeon: Tariq Anderson MD   Assistant(s): None   Anesthesia: Spinal anesthesia     Procedure Details:  The risks, benefits, complications, treatment options, and expected outcomes were discussed with the patient.  The patient concurred with the proposed plan, giving informed consent.  The site of surgery properly noted/marked. The patient was taken to Operating Room # 1, identified as Aliya Yip and the procedure verified as  Delivery. A Time Out was held and the above information confirmed.    After induction/confirmation of anesthesia, the patient was draped and prepped in the usual sterile manner. A Pfannenstiel incision was made and carried down through the subcutaneous tissue to the fascia. Fascial incision was made and extended transversely. The fascia was  from the underlying rectus tissue superiorly and inferiorly. The peritoneum was identified and entered. Peritoneal incision was extended longitudinally. The utero-vesical peritoneal reflection was incised transversely and the bladder flap was bluntly and sharply freed from the lower uterine segment. A low transverse uterine incision was made. Amniotic cavity entered.  The amniotic fluid was noted to be clear The infant was delivered from [facility]ERT[facility] presentation and was 8 lbs, 7 oz with Apgar scores of 8 at one minute and 9 at five minutes. After the umbilical cord was clamped and cut, cord blood was obtained for evaluation. The placenta was removed intact and appeared normal. The uterine outline, tubes and ovaries appeared normal. The uterine incision was closed with running locked sutures of 0 Vicryl. Hemostasis was observed. Lavage was carried out until clear. The fascia was then  reapproximated with running sutures of 0 Vicryl. The skin was reapproximated with sutures 4-0 Moncryl.    Instrument, sponge, and needle counts were correct prior the abdominal closure and at the conclusion of the case.     Estimated Blood Loss:  873cc   Total IV Fluids: (See anesthesia record)  Drains:   Thorne catheter, clear               Complications:  None           Disposition: To recovery    Condition: Stable      Tariq Anderson MD  6/3/2022 9:59 AM

## 2022-06-03 NOTE — ANESTHESIA CARE TRANSFER NOTE
Patient: Aliya Yip    Procedure: Procedure(s):  REPEAT  SECTION       Diagnosis: Previous  section [Z98.891]  Diagnosis Additional Information: No value filed.    Anesthesia Type:   Spinal     Note:    Oropharynx: spontaneously breathing  Level of Consciousness: awake  Oxygen Supplementation: room air    Independent Airway: airway patency satisfactory and stable    Vital Signs Stable: post-procedure vital signs reviewed and stable  Report to RN Given: handoff report given  Patient transferred to: Labor and Delivery    Handoff Report: Identifed the Patient, Identified the Reponsible Provider, Reviewed the pertinent medical history, Discussed the surgical course, Reviewed Intra-OP anesthesia mangement and issues during anesthesia, Set expectations for post-procedure period and Allowed opportunity for questions and acknowledgement of understanding      Vitals:  Vitals Value Taken Time   /57 22 0957   Temp 97.6  F (36.4  C) 22 0958   Pulse     Resp     SpO2 91 % 22 0959   Vitals shown include unvalidated device data.    Electronically Signed By: SHRUTHI Sotomayor CRNA  Mamie 3, 2022  10:01 AM

## 2022-06-03 NOTE — PLAN OF CARE
Data: Aliya Yip transferred to [facility] via cart at 1235. Baby transferred via parent's arms.  Action: Receiving unit notified of transfer: Yes. Patient and family notified of room change. Report given to Maria Elena HIRSCH RN at 1240. Belongings sent to receiving unit. Accompanied by Registered Nurse. Oriented patient to surroundings. Call light within reach. ID bands double-checked with receiving RN.  Response: Patient tolerated transfer and is stable.

## 2022-06-03 NOTE — ANESTHESIA PROCEDURE NOTES
Intrathecal injection Procedure Note    Pre-Procedure   Staff -        Anesthesiologist:  Elfego Weston MD       Performed By: anesthesiologist       Location: OR       Pre-Anesthestic Checklist: patient identified, IV checked, risks and benefits discussed, informed consent, monitors and equipment checked, pre-op evaluation, at physician/surgeon's request and post-op pain management  Timeout:       Correct Patient: Yes        Correct Procedure: Yes        Correct Site: Yes        Correct Position: Yes   Procedure Documentation  Procedure: intrathecal injection       Patient Position: sitting       Patient Prep/Sterile Barriers: sterile gloves, mask       Skin prep: Betadine       Insertion Site: L3-4. (midline approach).       Needle Gauge: 24.        Needle Length (Inches): 3.5        Spinal Needle Type: Sprotte       Introducer used       Introducer: 20 G       # of attempts: 1 and  # of redirects:     Assessment/Narrative         Paresthesias: No.       Sensory Level: T5       CSF fluid: clear.

## 2022-06-04 LAB — HGB BLD-MCNC: 8.1 G/DL (ref 11.7–15.7)

## 2022-06-04 PROCEDURE — 36415 COLL VENOUS BLD VENIPUNCTURE: CPT | Performed by: OBSTETRICS & GYNECOLOGY

## 2022-06-04 PROCEDURE — 250N000011 HC RX IP 250 OP 636: Performed by: ANESTHESIOLOGY

## 2022-06-04 PROCEDURE — 85018 HEMOGLOBIN: CPT | Performed by: OBSTETRICS & GYNECOLOGY

## 2022-06-04 PROCEDURE — 120N000001 HC R&B MED SURG/OB

## 2022-06-04 PROCEDURE — 250N000013 HC RX MED GY IP 250 OP 250 PS 637: Performed by: OBSTETRICS & GYNECOLOGY

## 2022-06-04 RX ADMIN — ACETAMINOPHEN 975 MG: 325 TABLET, FILM COATED ORAL at 21:04

## 2022-06-04 RX ADMIN — ACETAMINOPHEN 975 MG: 325 TABLET, FILM COATED ORAL at 08:59

## 2022-06-04 RX ADMIN — ACETAMINOPHEN 975 MG: 325 TABLET, FILM COATED ORAL at 15:20

## 2022-06-04 RX ADMIN — NALBUPHINE HYDROCHLORIDE 5 MG: 10 INJECTION, SOLUTION INTRAMUSCULAR; INTRAVENOUS; SUBCUTANEOUS at 11:39

## 2022-06-04 RX ADMIN — IBUPROFEN 800 MG: 800 TABLET, FILM COATED ORAL at 17:20

## 2022-06-04 RX ADMIN — SENNOSIDES AND DOCUSATE SODIUM 1 TABLET: 50; 8.6 TABLET ORAL at 21:04

## 2022-06-04 RX ADMIN — SENNOSIDES AND DOCUSATE SODIUM 1 TABLET: 50; 8.6 TABLET ORAL at 08:59

## 2022-06-04 RX ADMIN — IBUPROFEN 800 MG: 800 TABLET, FILM COATED ORAL at 05:02

## 2022-06-04 RX ADMIN — IBUPROFEN 800 MG: 800 TABLET, FILM COATED ORAL at 11:17

## 2022-06-04 RX ADMIN — ACETAMINOPHEN 975 MG: 325 TABLET, FILM COATED ORAL at 02:26

## 2022-06-04 RX ADMIN — NALBUPHINE HYDROCHLORIDE 5 MG: 10 INJECTION, SOLUTION INTRAMUSCULAR; INTRAVENOUS; SUBCUTANEOUS at 05:06

## 2022-06-04 ASSESSMENT — ACTIVITIES OF DAILY LIVING (ADL)
ADLS_ACUITY_SCORE: 18
ADLS_ACUITY_SCORE: 20
ADLS_ACUITY_SCORE: 18
ADLS_ACUITY_SCORE: 21
ADLS_ACUITY_SCORE: 18

## 2022-06-04 NOTE — PROGRESS NOTES
[facility]   Obstetrics Post-Op / Progress Note         Assessment and Plan:    Assessment:   Post-operative day #1  Low transverse repeat  section  L&D complications: Previous  section [Z98.891]      Doing well.  Pain well-controlled.      Plan:   Ambulation encouraged  Anticipate discharge tomorrow            Interval History:   Doing well.  Pain is well-controlled.  No fevers.  No history of wound drainage, warmth or significant erythema.  Good appetite.  Denies chest pain, shortness of breath, nausea or vomiting.  Ambulatory.  Breastfeeding well.          Significant Problems:    None          Review of Systems:    CONSTITUTIONAL: NEGATIVE for fever, chills, change in weight  INTEGUMENTARY/SKIN: NEGATIVE for worrisome rashes, moles or lesions  EYES: NEGATIVE for vision changes or irritation  ENT/MOUTH: NEGATIVE for ear, mouth and throat problems  RESP: NEGATIVE for significant cough or SOB  BREAST: NEGATIVE for masses, tenderness or discharge  CV: NEGATIVE for chest pain, palpitations or peripheral edema  GI: NEGATIVE for nausea, abdominal pain, heartburn, or change in bowel habits  : NEGATIVE for frequency, dysuria, or hematuria  MUSCULOSKELETAL: NEGATIVE for significant arthralgias or myalgia  NEURO: NEGATIVE for weakness, dizziness or paresthesias  ENDOCRINE: NEGATIVE for temperature intolerance, skin/hair changes  HEME: NEGATIVE for bleeding problems  PSYCHIATRIC: NEGATIVE for changes in mood or affect          Medications:   All medications related to the patient's surgery have been reviewed  -          Physical Exam:     All vitals stable  Patient Vitals for the past 8 hrs:   BP Temp Temp src Pulse Resp   22 0730 113/72 98  F (36.7  C) Oral 81 18   22 0515 114/72 97.9  F (36.6  C) Oral 71 16     Bandage clean and dry with minimal or no drainage.  Surrounding skin with minimal erythema.          Data:     All laboratory data related to this surgery  reviewed  Hemoglobin   Date Value Ref Range Status   06/04/2022 8.1 (L) 11.7 - 15.7 g/dL Final   06/02/2022 11.6 (L) 11.7 - 15.7 g/dL Final   04/12/2022 11.9 11.7 - 15.7 g/dL Final   03/15/2022 11.5 (L) 11.7 - 15.7 g/dL Final   10/27/2021 13.0 11.7 - 15.7 g/dL Final   11/18/2019 7.5 (L) 11.7 - 15.7 g/dL Final   11/17/2019 10.4 (L) 11.7 - 15.7 g/dL Final   08/28/2019 11.5 (L) 11.7 - 15.7 g/dL Final   08/23/2019 11.9 11.7 - 15.7 g/dL Final   04/15/2019 13.4 11.7 - 15.7 g/dL Final     All imaging studies related to this surgery reviewed    Chelle Gutierrez DO

## 2022-06-04 NOTE — PLAN OF CARE
Vitals stable this shift. Dressing dry and intact with shadowing on right side. Thorne removed and patient voided one measure one missed urine. Complaints of itching nubain given twice with relief. Tylenol and toradol given as ordered. Nipples tender mother love cream given. Some small bleeding noted in shield with bruising on right nipple. Baby has a very tight latch with lips needing flanging. Will continue to assist with feeds.    Patients mobililty level scored using the bedside mobility assistance tool (BMAT). Patient is at a mobility level test number: 3. Mobility equipment used: none required. Required assist of 1 staff members. Further use of BMAT scoring required.

## 2022-06-04 NOTE — PLAN OF CARE
Patient meeting expected goals. Is up independent in room, meeting all personal and infant needs. VSS. Pain is being managed with Tylenol and Ibuprofen.  Encouraged frequent voiding due to it being 6 hours since last. Did void without difficulty.  Incision to low abdomen with island dressing. Bandage is without drainage and no signs on infection noted to surrounding tissue. Fundus firm, scant vaginal bleeding, no clots. IV removed.  Breastfeeding with nipple shield. Colostrum drops noted with HE. Writer assisted with latch and positioning. Educated on: pinching breast tissue, waiting for wide open mouth, bring infant to breast to promote deep latch, flange upper and lower lips and listen for swallows. Parents aware to wake infant to feed every 2-3 hours and on demand and to call for latch assist. Lactation RN will also see couplet later today.

## 2022-06-04 NOTE — PLAN OF CARE
VSS, PP checks WNL. Voiding without difficulty. Pain well-managed by Tylenol and ibuprofen. Nubain for itching. Breastfeeding using nipple shield. SL flushed, patent. Independent with  cares.  at bedside, supportive.

## 2022-06-04 NOTE — LACTATION NOTE
"This note was copied from a baby's chart.  Lactation visit. This is Aliya's second child (Medhat). She pumped and bottle fed EBM for 6 months with her first child. She reports breastfeeding is going \"good\" with Medhat and she is \"working on positioning.\" Aliya called writer for help with a feeding. Hand expression was reviewed and colostrum was easily expressed. Aliya is using a nipple shield due to smooth nipples. Medhat latched on the left breast in football hold. A few swallows were heard with frequent tactile stimulation and breast compressions. Writer encouraged Aliya to hold Medhat close, so that he sustains a deep latch. Plan for lactation follow up as needed.   "

## 2022-06-05 VITALS
HEIGHT: 63 IN | TEMPERATURE: 98.6 F | BODY MASS INDEX: 34.55 KG/M2 | DIASTOLIC BLOOD PRESSURE: 74 MMHG | OXYGEN SATURATION: 100 % | HEART RATE: 90 BPM | SYSTOLIC BLOOD PRESSURE: 122 MMHG | WEIGHT: 195 LBS | RESPIRATION RATE: 20 BRPM

## 2022-06-05 PROCEDURE — 250N000013 HC RX MED GY IP 250 OP 250 PS 637: Performed by: OBSTETRICS & GYNECOLOGY

## 2022-06-05 RX ORDER — FERROUS SULFATE 325(65) MG
325 TABLET, DELAYED RELEASE (ENTERIC COATED) ORAL DAILY
Qty: 30 TABLET | Refills: 1 | Status: SHIPPED | OUTPATIENT
Start: 2022-06-05 | End: 2022-07-21 | Stop reason: SINTOL

## 2022-06-05 RX ADMIN — IBUPROFEN 800 MG: 800 TABLET, FILM COATED ORAL at 06:54

## 2022-06-05 RX ADMIN — SENNOSIDES AND DOCUSATE SODIUM 2 TABLET: 50; 8.6 TABLET ORAL at 09:14

## 2022-06-05 RX ADMIN — IBUPROFEN 800 MG: 800 TABLET, FILM COATED ORAL at 13:18

## 2022-06-05 RX ADMIN — ACETAMINOPHEN 975 MG: 325 TABLET, FILM COATED ORAL at 09:14

## 2022-06-05 RX ADMIN — IBUPROFEN 800 MG: 800 TABLET, FILM COATED ORAL at 00:19

## 2022-06-05 RX ADMIN — ACETAMINOPHEN 975 MG: 325 TABLET, FILM COATED ORAL at 03:17

## 2022-06-05 ASSESSMENT — ACTIVITIES OF DAILY LIVING (ADL)
ADLS_ACUITY_SCORE: 18

## 2022-06-05 NOTE — PLAN OF CARE
VSS. Incision site WNL. Island dressing, no drainage noted. Up ad jodi. Pain being managed with Ibuprofen and Tylenol. Breastfeeding infant, tolerating well. Nipples are sore, using mother's love and nipple shield. Independent with infant cares. FOB at bedside. Bonding well with infant.

## 2022-06-05 NOTE — PLAN OF CARE
Vitals stable. Dressing dry and intact with old shadowing. Up voiding without difficulty. No itching this shift. Tylenol and motrin for discomfort. Working on getting infant on independently. No dizziness.

## 2022-06-05 NOTE — DISCHARGE INSTRUCTIONS
Postpartum pain control:  It is normal to have abdominal pain following surgery. The goal is to get your pain level to a 3/10, enabling you to walk around comfortably. You may experience cramping for upto 1-2 weeks, particularly while breast feeding.     -Start your day by taking up to 3 tabs of regular strength ibuprofen (600mg), continue every 6 hours as needed for pain.    -You can additionally take 1-2 tabs of tylenol (325-650mg regular strength or 500-1000mg extra strength), every 6 hours for additional pain control as needed. Take no more than 4g of tylenol daily.     - Narcotic pain medication is available for breakthrough pain, you may find that you need it every 4-6 hours or just at night. You can take 1-2 tabs of oxycodone, roxicodone, or norco as needed. If you are taking a formulation that contains tylenol (oxycodone, norco) do NOT take additional tylenol.     It is best to alternate your medications so you are taking something every 3 hours if you are having continued pain.    For example:  6am: ibuprofen 3 tabs (600 mg)   9am: tylenol 1000 mg  12pm: ibuprofen 600 mg  3pm: tylenol 1000 mg  6pm: ibuprofen 600 mg  9pm: tyelnol 1000 mg  12am: ibuprofen 600 mg    Remember, you can use the oxycodone or other narcotic pain medication in between these times if needed for significant breakthrough pain that makes it hard to walk or function. This may be necessary for 3-7 days, especially.    If you have vaginal pain you can take sitz baths 1-2x/day. Fill the tub with 2-3 inches of warm water and soak the perineal and vaginal area for 10 minutes. Ice or warm packs can also be applied for comfort.      Warning signs:  You can expect some continued spotting or bleeding for the next 4 weeks or so, if you develop significantly higher flow more than 1 pad/hour please call the clinic right away. If you develop fever to 100.4 or greater, increasing pelvic/abdominal pain, or foul smelling discharge please contact the  clinic. Increased volume of discharge is normal. If you develop a severe headache, especially with visual changes, please call the clinic.    Limitations:  No lifting > 10lb for 6 weeks  No driving for 2 weeks or while using narcotic pain medications  Nothing per vagina (no intercourse or tampons) for 6 weeks     Constipation  You may use the following products to ease constipation:    1. Stool softeners such as metamucil or benefiber  2. senna 1-2 times daily  3. Fiber supplements  4. miralax (over the counter).  5. Dulcolax    Please be sure to keep adequately hydrated; 6-8 8oz glasses daily, more if needed to compensate for exercise, sweating, etc.      More specific dosing can be found below:    Metamucil 28g daily PO with 8oz of water  senna-docusate 8.6-50 MG per tablet PO 1 tablet, Oral, 2 TIMES DAILY, Start with 1 tablet PO BID, reduce to 1 tablet daily when having daily BMs. Stop for loose stools.  docusate sodium (COLACE) capsule 100 mg, Oral, 2 TIMES DAILY, To prevent constipation. Hold for loose stools.  bisacodyl (DULCOLAX) suppository 10 mg, Rectal, DAILY PRN, constipation, Hold for loose stools.       Please contact the clinic with any questions.  Please schedule a follow up appointment with your primary OB/Gyn provider in 6 weeks and sooner if you have any problems.   You can contact the clinic via [facility] [employer] or call [facility] at [phone].     Postop  Birth Instructions    Activity     Do not lift more than 10 pounds for 6 weeks after surgery.  Ask family and friends for help when you need it.  No driving until you have stopped taking your pain medications (usually two weeks after surgery).  No heavy exercise or activity for 6 weeks.  Don't do anything that will put a strain on your surgery site.  Don't strain when using the toilet.  Your care team may prescribe a stool softener if you have problems with your bowel movements.     To care for your incision:     Keep the incision clean and  dry.  Do not soak your incision in water. No swimming or hot tubs until it has fully healed. You may soak in the bathtub if the water level is below your incision.  Do not use peroxide, gel, cream, lotion, or ointment on your incision.  Adjust your clothes to avoid pressure on your surgery site (check the elastic in your underwear for example).     You may see a small amount of clear or pink drainage and this is normal.  Check with your health care provider:     If the drainage increases or has an odor.  If the incision reddens, you have swelling, or develop a rash.  If you have increased pain and the medicine we prescribed doesn't help.  If you have a fever above 100.4 F (38 C) with or without chills when placing thermometer under your tongue.   The area around your incision (surgery wound), will feel numb.  This is normal. The numbness should go away in less than a year.     Keep your hands clean:  Always wash your hands before touching your incision (surgery wound). This helps reduce your risk of infection. If your hands aren't dirty, you may use an alcohol hand-rub to clean your hands. Keep your nails clean and short.    Call your healthcare provider if you have any of these symptoms:     You soak a sanitary pad with blood within 1 hour, or you see blood clots larger than a golf ball.  Bleeding that lasts more than 6 weeks.  Vaginal discharge that smells bad.  Severe pain, cramping or tenderness in your lower belly area.  A need to urinate more frequently (use the toilet more often), more urgently (use the toilet very quickly), or it burns when you urinate.  Nausea and vomiting.  Redness, swelling or pain around a vein in your leg.  Problems breastfeeding or a red or painful area on your breast.  Chest pain and cough or are gasping for air.  Problems with coping with sadness, anxiety or depression. If you have concerns about hurting yourself or the baby, call your provider immediately.    You have questions or  concerns after you return home.

## 2022-06-05 NOTE — PLAN OF CARE
Patient meeting expected goals. Is up independent in room, meeting all personal and infant needs. VSS. Pain is being managed with tylenol and Ibuprofen. Using mother love for nipple discomfort and writer will bring in and explain how to use hydrogels. Voiding with out difficulty. Incision to low abdomen is with island dressing. Area is without drainage and no signs on infection noted to surrounding tissue. Breastfeeding is going well and bonding well with infant. MD to round and will remove dressing. Plan for discharge to home later today.

## 2022-06-05 NOTE — DISCHARGE SUMMARY
[facility]    Discharge Summary  Obstetrics    Date of Admission:  6/3/2022  Date of Discharge:  2022  Discharging Provider: Ally Russell MD    Discharge Diagnoses   Status post repeat low transverse    ABLA due to expected surgical losses  Procedure/Surgery Information   Procedure: Procedure(s):  REPEAT  SECTION   Surgeon(s): Surgeon(s) and Role:     * Tariq Anderson MD - Primary     History of Present Illness   Aliya Yip is a 31 year old female who presented for planned repeat .    Hospital Course   The patient's hospital course was unremarkable.  She recovered as anticipated and experienced no post-operative complications.  On discharge, her pain was well controlled. Vaginal bleeding is similar to peak menstrual flow.  Voiding without difficulty.  Ambulating well and tolerating a normal diet.  No fever or significant wound drainage.  Breastfeeding well.  Infant is stable.  She was discharged on post-partum day #2. She can continue a prenatal vitamin and iron supplementation for ABLA due to expected surgical losses.    Post-partum hemoglobin:   Hemoglobin   Date Value Ref Range Status   2022 8.1 (L) 11.7 - 15.7 g/dL Final   2019 7.5 (L) 11.7 - 15.7 g/dL Final       [place] Depression Scale  Thoughts of Harming Self:    Total Score:        Ally Russell MD    Discharge Disposition   Discharged to home   Condition at discharge: Stable    Pending Results     Unresulted Labs Ordered in the Past 30 Days of this Admission     No orders found from 2022 to 2022.          Primary Care Physician   LEELA MEI    Consultations This Hospital Stay   LACTATION IP CONSULT  CARE MANAGEMENT / SOCIAL WORK IP CONSULT    Discharge Orders      Postpartum Home Care Referral      Activity    Activity as tolerated     Reason for your hospital stay    Maternity care     Activity    No driving while on narcotics     Activity    No  lifting more than 15 pounds for 6 weeks     Follow Up    Follow up with provider in 6 weeks for post-delivery check     Breast pump    Breast Pump Documentation:  Manual/Electric Pump: To support adequate breast milk production and nutrition for infant.     I, the undersigned, certify that the above prescribed supplies are medically necessary for this patient and is both reasonable and necessary in reference to accepted standards of medical and necessary in reference to accepted standards of medical practice in the treatment of this patient's condition and is not prescribed as a convenience.     Diet    Resume previous diet     Discharge Medications   Current Discharge Medication List      CONTINUE these medications which have NOT CHANGED    Details   Prenatal Vit-Fe Fumarate-FA (PNV PRENATAL PLUS MULTIVITAMIN) 27-1 MG TABS per tablet Take 1 tablet by mouth daily           Allergies   No Known Allergies

## 2022-06-07 NOTE — ADDENDUM NOTE
Addendum  created 06/07/22 1546 by Elfego Weston MD    Actions taken from a BestPractice Advisory, Attestation recorded in Intraprocedure, Intraprocedure Attestations deleted, Intraprocedure Attestations filed, Intraprocedure Event edited

## 2022-07-21 ENCOUNTER — PRENATAL OFFICE VISIT (OUTPATIENT)
Dept: OBGYN | Facility: CLINIC | Age: 32
End: 2022-07-21
Payer: COMMERCIAL

## 2022-07-21 VITALS — BODY MASS INDEX: 28.52 KG/M2 | WEIGHT: 161 LBS | DIASTOLIC BLOOD PRESSURE: 70 MMHG | SYSTOLIC BLOOD PRESSURE: 106 MMHG

## 2022-07-21 PROCEDURE — 99207 PR POST PARTUM EXAM: CPT | Performed by: OBSTETRICS & GYNECOLOGY

## 2022-07-21 RX ORDER — NORGESTIMATE AND ETHINYL ESTRADIOL 0.25-0.035
1 KIT ORAL DAILY
Qty: 84 TABLET | Refills: 4 | Status: SHIPPED | OUTPATIENT
Start: 2022-07-21 | End: 2023-07-31

## 2022-07-21 ASSESSMENT — PATIENT HEALTH QUESTIONNAIRE - PHQ9: SUM OF ALL RESPONSES TO PHQ QUESTIONS 1-9: 0

## 2022-07-21 NOTE — NURSING NOTE
"Chief Complaint   Patient presents with     Postpartum Care     initial /70   Wt 73 kg (161 lb)   LMP 08/12/2021 (Within Days)   Breastfeeding Yes   BMI 28.52 kg/m   Estimated body mass index is 28.52 kg/m  as calculated from the following:    Height as of 6/3/22: 1.6 m (5' 3\").    Weight as of this encounter: 73 kg (161 lb).  BP completed using cuff size regular.  Franny Quinonez CMA    "

## 2022-07-21 NOTE — PROGRESS NOTES
SUBJECTIVE:  Aliya Yip,  is here for a postpartum visit.  She had a scheduled repeat  Section  on 6/3/22 delivering a healthy baby boy, named Medhat weighing 8 lbs 7 oz at term.      HPI:  Scheduled repeat C/S    Last PHQ-9 score on record= No flowsheet data found.  No flowsheet data found.    Delivery complications:  No  Breast feeding:  Yes, pumping  Bladder problems:  No  Bowel problems/hemorrhoids:  No  Episiotomy/laceration/incision healed? N/A  Vaginal flow:  random  [place]:  No  Contraception: oral contraceptives  Emotional adjustment:  doing well and happy  Back to work:      12 point review of systems negative other than symptoms noted below or in the HPI.  12 point review of systems negative other than symptoms noted below.    OBJECTIVE:  Vitals: LMP 2021 (Within Days)   BMI= There is no height or weight on file to calculate BMI.  General - pleasant female in no acute distress.  Breast -  deferred  Abdomen - Incision well-healed  Pelvic - deferred.  Rectovaginal - deferred.    ASSESSMENT:    ICD-10-CM    1. Routine postpartum follow-up  Z39.2        PLAN:  May resume normal activities without restrictions.  Pap smear was not done today.    Full counseling was provided, and all questions were answered.   Return to clinic in one year for an annual visit.   Script for Tri Sprintec sent to pharmacy as patient is weaning    Chuck Anderson MD

## 2022-08-24 RX ORDER — DICLOXACILLIN SODIUM 500 MG
500 CAPSULE ORAL 4 TIMES DAILY
Qty: 28 CAPSULE | Refills: 0 | Status: SHIPPED | OUTPATIENT
Start: 2022-08-24 | End: 2022-08-31

## 2022-08-31 RX ORDER — DICLOXACILLIN SODIUM 500 MG
500 CAPSULE ORAL 4 TIMES DAILY
Qty: 28 CAPSULE | Refills: 0 | Status: SHIPPED | OUTPATIENT
Start: 2022-08-31 | End: 2023-10-09

## 2022-11-21 ENCOUNTER — HEALTH MAINTENANCE LETTER (OUTPATIENT)
Age: 32
End: 2022-11-21

## 2023-06-02 ENCOUNTER — HEALTH MAINTENANCE LETTER (OUTPATIENT)
Age: 33
End: 2023-06-02

## 2023-07-31 RX ORDER — NORGESTIMATE AND ETHINYL ESTRADIOL 0.25-0.035
KIT ORAL
Qty: 90 TABLET | Refills: 0 | Status: SHIPPED | OUTPATIENT
Start: 2023-07-31 | End: 2023-10-09

## 2023-07-31 NOTE — TELEPHONE ENCOUNTER
Medication is being filled for 1 time refill only due to:  Patient needs to be seen because it has been more than one year since last visit.    Patient notified of need of visit via batteriit.    Dorothea LOBO RN

## 2023-10-09 ENCOUNTER — OFFICE VISIT (OUTPATIENT)
Dept: OBGYN | Facility: CLINIC | Age: 33
End: 2023-10-09
Payer: COMMERCIAL

## 2023-10-09 VITALS
WEIGHT: 173 LBS | DIASTOLIC BLOOD PRESSURE: 70 MMHG | SYSTOLIC BLOOD PRESSURE: 112 MMHG | BODY MASS INDEX: 30.65 KG/M2 | HEIGHT: 63 IN

## 2023-10-09 DIAGNOSIS — Z01.419 ENCOUNTER FOR BREAST AND PELVIC EXAMINATION: Primary | ICD-10-CM

## 2023-10-09 DIAGNOSIS — Z12.4 SCREENING FOR CERVICAL CANCER: ICD-10-CM

## 2023-10-09 PROCEDURE — 99395 PREV VISIT EST AGE 18-39: CPT | Performed by: OBSTETRICS & GYNECOLOGY

## 2023-10-09 PROCEDURE — 87624 HPV HI-RISK TYP POOLED RSLT: CPT | Performed by: OBSTETRICS & GYNECOLOGY

## 2023-10-09 PROCEDURE — G0145 SCR C/V CYTO,THINLAYER,RESCR: HCPCS | Performed by: OBSTETRICS & GYNECOLOGY

## 2023-10-09 RX ORDER — NORGESTIMATE AND ETHINYL ESTRADIOL 0.25-0.035
1 KIT ORAL DAILY
Qty: 84 TABLET | Refills: 4 | Status: SHIPPED | OUTPATIENT
Start: 2023-10-09 | End: 2024-06-04

## 2023-10-09 NOTE — PROGRESS NOTES
Aliya is a 33 year old  female who presents for annual exam.     Menses are regular q 28-30 days and normal lasting 4 days.  Menses flow: normal.  Patient's last menstrual period was 2023 (approximate).. Using oral contraceptives for contraception.  She is not currently considering pregnancy.  Besides routine health maintenance, she has no other health concerns today .  Considering something longer term for contraception - doesn't plan any more kids  Her mom got breast cancer in her 50-s.  She will ask her mom about any breast cancer testing for genes that she did  Due for pap in a couple of months  Pumped for her infant for 3 mo, now done pumping  GYNECOLOGIC HISTORY:    Aliya is sexually active with 1male partner(s) and is currently in monogamous relationship with .    History sexually transmitted infections:No STD history  STI testing offered?  Declined  History of abnormal Pap smear: NO - age 30-65 PAP every 5 years with negative HPV co-testing recommended  Family history of breast CA: yes - mother in 50s  Family history of uterine/ovarian CA: No      HEALTH MAINTENANCE:  .  TSH:   TSH   Date Value Ref Range Status   2019 0.89 0.40 - 4.00 mU/L Final     Pap;  Nil  Lab Results   Component Value Date    PAP NIL 02/10/2021      PHQ2: 0      10/9/2023     9:31 AM 2022    10:40 AM   PHQ-2 (  [employer])   Q1: Little interest or pleasure in doing things 0 0   Q2: Feeling down, depressed or hopeless 0 0   PHQ-2 Score 0 0         HISTORY:  OB History    Para Term  AB Living   2 2 2 0 0 2   SAB IAB Ectopic Multiple Live Births   0 0 0 0 2      # Outcome Date GA Lbr Beto/2nd Weight Sex Delivery Anes PTL Lv   2 Term 22 39w1d  3.84 kg (8 lb 7.5 oz) M CS-LTranv Spinal N DHEERAJ      Name: Medhat   1 Term 19 38w6d 04:10 / 05:18 4.29 kg (9 lb 7.3 oz) M CS-Unspec   DHEERAJ      Name: Yoel      Apgar1: 9  Apgar5: 9     Past Medical History:   Diagnosis Date    Genital  "herpes     PONV (postoperative nausea and vomiting)     UTI (urinary tract infection)      Past Surgical History:   Procedure Laterality Date     SECTION N/A 2019    Procedure:  SECTION;  Surgeon: Tariq Anderson MD;  Location: [facility] OR     SECTION N/A 6/3/2022    Procedure: REPEAT  SECTION;  Surgeon: Tariq Anderson MD;  Location: [facility] L+D     Family History   Problem Relation Age of Onset    Diabetes Mother     Breast Cancer Mother 60.00    Hypertension Mother     Congenital heart disease Brother 18.00     Social History     Socioeconomic History    Marital status:      Spouse name: None    Number of children: None    Years of education: None    Highest education level: None   Occupational History    Occupation: lock desk analyst   Tobacco Use    Smoking status: Never    Smokeless tobacco: Never   Vaping Use    Vaping Use: Never used   Substance and Sexual Activity    Alcohol use: Not Currently     Comment: socially    Drug use: Never    Sexual activity: Yes     Partners: Male     Birth control/protection: OCP       Current Outpatient Medications:     dicloxacillin (DYNAPEN) 500 MG capsule, Take 1 capsule (500 mg) by mouth 4 times daily, Disp: 28 capsule, Rfl: 0    norgestimate-ethinyl estradiol (SPRINTEC 28) 0.25-35 MG-MCG tablet, TAKE ONE TABLET BY MOUTH EVERY DAY, Disp: 90 tablet, Rfl: 0    Prenatal Vit-Fe Fumarate-FA (PNV PRENATAL PLUS MULTIVITAMIN) 27-1 MG TABS per tablet, Take 1 tablet by mouth daily, Disp: , Rfl:    No Known Allergies    Past medical, surgical, social and family history were reviewed and updated in [facility].    EXAM:  /70 (BP Location: Right arm, Patient Position: Chair, Cuff Size: Adult Large)   Ht 1.6 m (5' 3\")   Wt 78.5 kg (173 lb)   LMP 2023 (Approximate)   Breastfeeding No   BMI 30.65 kg/m     BMI: Body mass index is 30.65 kg/m .  Constitutional: healthy, alert and no distress  Breast: No nodularity, asymmetry or " nipple discharge bilaterally.  Gastrointestinal: Abdomen soft, non-tender, non-distended. No masses, organomegaly.  :  Vulva:  No external lesions, normal female hair distribution, no inguinal adenopathy.    Urethra:  Midline, non-tender, well supported, no discharge  Vagina:  Moist, pink, no abnormal discharge, no lesions  Uterus:  Normal size , non-tender, freely mobile  Cvx nl os slightly open.  Pap obtained  Ovaries:  No masses appreciated, non-tender, mobile  Rectal Exam: deferred  Musculoskeletal: extremities normal  Skin: no suspicious lesions or rashes  Psychiatric: Affect appropriate, cooperative,mentation appears normal.     COUNSELING:   Reviewed preventive health counseling, as reflected in patient instructions  Special attention given to:        Contraception       Family planning   reports that she has never smoked. She has never used smokeless tobacco.    Body mass index is 30.65 kg/m .  Weight management plan: Patient was referred to their PCP to discuss a diet and exercise plan.  FRAX Risk Assessment    ASSESSMENT:  33 year old female with satisfactory annual exam    1. Encounter for breast and pelvic examination  Mother with breast cancer  Encouraged her to ask mother about GC and recommendations for her br ca screening, otherwise can do GC on her own.    2. Routine postpartum follow-up  Reviewed [facility] methods, she will call to schedule.  - norgestimate-ethinyl estradiol (SPRINTEC 28) 0.25-35 MG-MCG tablet; Take 1 tablet by mouth daily Can skip placebo pills  Dispense: 84 tablet; Refill: 4    3. Screening for cervical cancer  - Pap imaged thin layer screen with HPV - recommended age 30 - 65     Jacquelyn Leon MD

## 2023-10-09 NOTE — NURSING NOTE
"No chief complaint on file.      Initial /70 (BP Location: Right arm, Patient Position: Chair, Cuff Size: Adult Large)   Ht 1.6 m (5' 3\")   Wt 78.5 kg (173 lb)   LMP 2023 (Approximate)   Breastfeeding No   BMI 30.65 kg/m   Estimated body mass index is 30.65 kg/m  as calculated from the following:    Height as of this encounter: 1.6 m (5' 3\").    Weight as of this encounter: 78.5 kg (173 lb).  BP completed using cuff size: regular    Questioned patient about current smoking habits.  Pt. has never smoked.          The following HM Due: pap smear    Jennifer Mcgarry CMA           "

## 2023-10-11 LAB
BKR LAB AP GYN ADEQUACY: NORMAL
BKR LAB AP GYN INTERPRETATION: NORMAL
BKR LAB AP HPV REFLEX: NORMAL
BKR LAB AP LMP: NORMAL
BKR LAB AP PREVIOUS ABNORMAL: NORMAL
PATH REPORT.COMMENTS IMP SPEC: NORMAL
PATH REPORT.COMMENTS IMP SPEC: NORMAL
PATH REPORT.RELEVANT HX SPEC: NORMAL

## 2023-10-13 LAB
HUMAN PAPILLOMA VIRUS 16 DNA: NEGATIVE
HUMAN PAPILLOMA VIRUS 18 DNA: NEGATIVE
HUMAN PAPILLOMA VIRUS FINAL DIAGNOSIS: NORMAL
HUMAN PAPILLOMA VIRUS OTHER HR: NEGATIVE

## 2024-11-09 ENCOUNTER — HEALTH MAINTENANCE LETTER (OUTPATIENT)
Age: 34
End: 2024-11-09

## 2024-11-27 ENCOUNTER — TELEPHONE (OUTPATIENT)
Dept: OBGYN | Facility: CLINIC | Age: 34
End: 2024-11-27
Payer: COMMERCIAL

## 2024-11-27 RX ORDER — NORGESTIMATE AND ETHINYL ESTRADIOL 0.25-0.035
1 KIT ORAL DAILY
Qty: 90 TABLET | Refills: 1 | Status: CANCELLED | OUTPATIENT
Start: 2024-11-27

## 2024-11-27 NOTE — TELEPHONE ENCOUNTER
norgestimate-ethinyl estradiol (SPRINTEC 28) 0.25-35 MG-MCG tablet     PT NEEDS 90 DAY SUPPLY PER INSURANCE REQUEST.    Last Written Prescription Date:  10/09/2024  Last Fill Quantity: 84,  # refills: 1   Last office visit: 10/9/2023 ; last virtual visit: Visit date not found with prescribing provider:  Dr Edward Grant Office Visit:  NONE        Nilda Fung LPN on 11/27/2024 at 11:12 AM

## 2024-12-26 SDOH — HEALTH STABILITY: PHYSICAL HEALTH: ON AVERAGE, HOW MANY MINUTES DO YOU ENGAGE IN EXERCISE AT THIS LEVEL?: 30 MIN

## 2024-12-26 SDOH — HEALTH STABILITY: PHYSICAL HEALTH: ON AVERAGE, HOW MANY DAYS PER WEEK DO YOU ENGAGE IN MODERATE TO STRENUOUS EXERCISE (LIKE A BRISK WALK)?: 1 DAY

## 2024-12-26 ASSESSMENT — SOCIAL DETERMINANTS OF HEALTH (SDOH): HOW OFTEN DO YOU GET TOGETHER WITH FRIENDS OR RELATIVES?: ONCE A WEEK

## 2024-12-31 ENCOUNTER — OFFICE VISIT (OUTPATIENT)
Dept: INTERNAL MEDICINE | Facility: CLINIC | Age: 34
End: 2024-12-31
Payer: COMMERCIAL

## 2024-12-31 ENCOUNTER — PATIENT OUTREACH (OUTPATIENT)
Dept: ONCOLOGY | Facility: CLINIC | Age: 34
End: 2024-12-31

## 2024-12-31 VITALS
HEART RATE: 82 BPM | OXYGEN SATURATION: 99 % | BODY MASS INDEX: 31.45 KG/M2 | DIASTOLIC BLOOD PRESSURE: 84 MMHG | WEIGHT: 170.9 LBS | SYSTOLIC BLOOD PRESSURE: 123 MMHG | TEMPERATURE: 98.1 F | HEIGHT: 62 IN | RESPIRATION RATE: 14 BRPM

## 2024-12-31 DIAGNOSIS — Z13.1 SCREENING FOR DIABETES MELLITUS: ICD-10-CM

## 2024-12-31 DIAGNOSIS — Z00.00 ENCOUNTER FOR PREVENTIVE HEALTH EXAMINATION: Primary | ICD-10-CM

## 2024-12-31 DIAGNOSIS — Z80.3 FAMILY HISTORY OF BREAST CANCER: ICD-10-CM

## 2024-12-31 DIAGNOSIS — Z13.220 LIPID SCREENING: ICD-10-CM

## 2024-12-31 DIAGNOSIS — Z83.3 FAMILY HISTORY OF DIABETES MELLITUS: ICD-10-CM

## 2024-12-31 DIAGNOSIS — Z13.29 SCREENING FOR THYROID DISORDER: ICD-10-CM

## 2024-12-31 LAB
ALBUMIN SERPL BCG-MCNC: 4.3 G/DL (ref 3.5–5.2)
ALP SERPL-CCNC: 58 U/L (ref 40–150)
ALT SERPL W P-5'-P-CCNC: 10 U/L (ref 0–50)
ANION GAP SERPL CALCULATED.3IONS-SCNC: 11 MMOL/L (ref 7–15)
AST SERPL W P-5'-P-CCNC: 17 U/L (ref 0–45)
BASOPHILS # BLD AUTO: 0 10E3/UL (ref 0–0.2)
BASOPHILS NFR BLD AUTO: 0 %
BILIRUB SERPL-MCNC: 0.2 MG/DL
BUN SERPL-MCNC: 12.9 MG/DL (ref 6–20)
CALCIUM SERPL-MCNC: 10 MG/DL (ref 8.8–10.4)
CHLORIDE SERPL-SCNC: 103 MMOL/L (ref 98–107)
CHOLEST SERPL-MCNC: 188 MG/DL
CREAT SERPL-MCNC: 0.88 MG/DL (ref 0.51–0.95)
EGFRCR SERPLBLD CKD-EPI 2021: 88 ML/MIN/1.73M2
EOSINOPHIL # BLD AUTO: 0.1 10E3/UL (ref 0–0.7)
EOSINOPHIL NFR BLD AUTO: 3 %
ERYTHROCYTE [DISTWIDTH] IN BLOOD BY AUTOMATED COUNT: 11.6 % (ref 10–15)
EST. AVERAGE GLUCOSE BLD GHB EST-MCNC: 105 MG/DL
FASTING STATUS PATIENT QL REPORTED: NO
FASTING STATUS PATIENT QL REPORTED: NO
GLUCOSE SERPL-MCNC: 96 MG/DL (ref 70–99)
HBA1C MFR BLD: 5.3 % (ref 0–5.6)
HCO3 SERPL-SCNC: 26 MMOL/L (ref 22–29)
HCT VFR BLD AUTO: 38.9 % (ref 35–47)
HDLC SERPL-MCNC: 36 MG/DL
HGB BLD-MCNC: 13.9 G/DL (ref 11.7–15.7)
IMM GRANULOCYTES # BLD: 0 10E3/UL
IMM GRANULOCYTES NFR BLD: 0 %
LDLC SERPL CALC-MCNC: ABNORMAL MG/DL
LDLC SERPL DIRECT ASSAY-MCNC: 76 MG/DL
LYMPHOCYTES # BLD AUTO: 1 10E3/UL (ref 0.8–5.3)
LYMPHOCYTES NFR BLD AUTO: 28 %
MCH RBC QN AUTO: 30.9 PG (ref 26.5–33)
MCHC RBC AUTO-ENTMCNC: 35.7 G/DL (ref 31.5–36.5)
MCV RBC AUTO: 86 FL (ref 78–100)
MONOCYTES # BLD AUTO: 0.3 10E3/UL (ref 0–1.3)
MONOCYTES NFR BLD AUTO: 7 %
NEUTROPHILS # BLD AUTO: 2.3 10E3/UL (ref 1.6–8.3)
NEUTROPHILS NFR BLD AUTO: 63 %
NONHDLC SERPL-MCNC: 152 MG/DL
PLATELET # BLD AUTO: 204 10E3/UL (ref 150–450)
POTASSIUM SERPL-SCNC: 4.2 MMOL/L (ref 3.4–5.3)
PROT SERPL-MCNC: 7.2 G/DL (ref 6.4–8.3)
RBC # BLD AUTO: 4.5 10E6/UL (ref 3.8–5.2)
SODIUM SERPL-SCNC: 140 MMOL/L (ref 135–145)
TRIGL SERPL-MCNC: 405 MG/DL
TSH SERPL DL<=0.005 MIU/L-ACNC: 1.27 UIU/ML (ref 0.3–4.2)
WBC # BLD AUTO: 3.7 10E3/UL (ref 4–11)

## 2024-12-31 PROCEDURE — 99385 PREV VISIT NEW AGE 18-39: CPT | Performed by: NURSE PRACTITIONER

## 2024-12-31 PROCEDURE — 84443 ASSAY THYROID STIM HORMONE: CPT | Performed by: NURSE PRACTITIONER

## 2024-12-31 PROCEDURE — 83721 ASSAY OF BLOOD LIPOPROTEIN: CPT | Performed by: NURSE PRACTITIONER

## 2024-12-31 PROCEDURE — 80061 LIPID PANEL: CPT | Mod: 59 | Performed by: NURSE PRACTITIONER

## 2024-12-31 PROCEDURE — 85025 COMPLETE CBC W/AUTO DIFF WBC: CPT | Performed by: NURSE PRACTITIONER

## 2024-12-31 PROCEDURE — 36415 COLL VENOUS BLD VENIPUNCTURE: CPT | Performed by: NURSE PRACTITIONER

## 2024-12-31 PROCEDURE — 83036 HEMOGLOBIN GLYCOSYLATED A1C: CPT | Performed by: NURSE PRACTITIONER

## 2024-12-31 PROCEDURE — 80053 COMPREHEN METABOLIC PANEL: CPT | Performed by: NURSE PRACTITIONER

## 2024-12-31 RX ORDER — NORGESTIMATE AND ETHINYL ESTRADIOL 0.25-0.035
1 KIT ORAL DAILY
Qty: 90 TABLET | Refills: 3 | Status: SHIPPED | OUTPATIENT
Start: 2024-12-31

## 2024-12-31 ASSESSMENT — PAIN SCALES - GENERAL: PAINLEVEL_OUTOF10: NO PAIN (0)

## 2024-12-31 NOTE — PROGRESS NOTES
"Preventive Care Visit  [facility]  Tracy Mari CNP, Internal Medicine  Dec 31, 2024      Assessment & Plan     Encounter for preventive health examination  Counseling: counseling provided regarding nutrition, regular exercise, general safety and periodic health exams   Pap up to date  Immunizations up to date  Labs today    - Lipid panel reflex to direct LDL Fasting; Future  - Comprehensive metabolic panel (BMP + Alb, Alk Phos, ALT, AST, Total. Bili, TP); Future  - CBC with platelets and differential; Future  - TSH with free T4 reflex; Future  - Hemoglobin A1c; Future  - Lipid panel reflex to direct LDL Fasting  - Comprehensive metabolic panel (BMP + Alb, Alk Phos, ALT, AST, Total. Bili, TP)  - CBC with platelets and differential  - TSH with free T4 reflex  - Hemoglobin A1c    Routine postpartum follow-up    - norgestimate-ethinyl estradiol (SPRINTEC 28) 0.25-35 MG-MCG tablet; Take 1 tablet by mouth daily. Can skip placebo pills    Family history of breast cancer  -History of breast cancer in mom and also maternal aunt, referral for genetic testing    - Adult Genetics [facility] Metabolism [facility] Referral; Future    Family history of diabetes mellitus    - Hemoglobin A1c; Future  - Hemoglobin A1c    Screening for diabetes mellitus    - Lipid panel reflex to direct LDL Fasting; Future  - Hemoglobin A1c; Future  - Lipid panel reflex to direct LDL Fasting  - Hemoglobin A1c    Lipid screening    - Lipid panel reflex to direct LDL Fasting; Future  - Lipid panel reflex to direct LDL Fasting    Screening for thyroid disorder    - TSH with free T4 reflex; Future  - TSH with free T4 reflex        BMI  Estimated body mass index is 31.01 kg/m  as calculated from the following:    Height as of this encounter: 1.581 m (5' 2.25\").    Weight as of this encounter: 77.5 kg (170 lb 14.4 oz).       Counseling  Appropriate preventive services were addressed with this patient via screening, questionnaire, or " discussion as appropriate for fall prevention, nutrition, physical activity, Tobacco-use cessation, social engagement, weight loss and cognition.  Checklist reviewing preventive services available has been given to the patient.  Reviewed patient's diet, addressing concerns and/or questions.   She is at risk for lack of exercise and has been provided with information to increase physical activity for the benefit of her well-being.           Subjective   Aliya is a 34 year old, presenting for the following:  Physical (Not fasting)        12/31/2024     8:20 AM   Additional Questions   Roomed by Aliya HIRSCH   Accompanied by n/a          NOHEMY  Aliya presents to the clinic today for annual physical.  Overall she seems to be doing well.  Requests refill of birth control.      Health Care Directive  Patient does not have a Health Care Directive: Discussed advance care planning with patient; however, patient declined at this time.      12/26/2024   General Health   How would you rate your overall physical health? Good   Feel stress (tense, anxious, or unable to sleep) Not at all         12/26/2024   Nutrition   Three or more servings of calcium each day? Yes   Diet: Regular (no restrictions)   How many servings of fruit and vegetables per day? (!) 0-1   How many sweetened beverages each day? 0-1         12/26/2024   Exercise   Days per week of moderate/strenous exercise 1 day   Average minutes spent exercising at this level 30 min   (!) EXERCISE CONCERN      12/26/2024   Social Factors   Frequency of gathering with friends or relatives Once a week   Worry food won't last until get money to buy more No   Food not last or not have enough money for food? No   Do you have housing? (Housing is defined as stable permanent housing and does not include staying ouside in a car, in a tent, in an abandoned building, in an overnight shelter, or couch-surfing.) Yes   Are you worried about losing your housing? No   Lack of transportation?  No   Unable to get utilities (heat,electricity)? No         12/26/2024   Dental   Dentist two times every year? Yes         12/26/2024   TB Screening   Were you born outside of the [place]? No         Today's PHQ-2 Score:       12/30/2024     5:25 PM   PHQ-2 ( 1999 [employer])   Q1: Little interest or pleasure in doing things 0   Q2: Feeling down, depressed or hopeless 0   PHQ-2 Score 0    Q1: Little interest or pleasure in doing things Not at all   Q2: Feeling down, depressed or hopeless Not at all   PHQ-2 Score 0       Patient-reported           12/26/2024   Substance Use   Alcohol more than 3/day or more than 7/wk No   Do you use any other substances recreationally? No     Social History     Tobacco Use    Smoking status: Never    Smokeless tobacco: Never   Vaping Use    Vaping status: Never Used   Substance Use Topics    Alcohol use: Not Currently     Comment: socially    Drug use: Never          Mammogram Screening - Patient under 40 years of age: Routine Mammogram Screening not recommended.         12/26/2024   STI Screening   New sexual partner(s) since last STI/HIV test? No     History of abnormal Pap smear: No - age 30- 64 PAP with HPV every 5 years recommended        Latest Ref Rng & Units 10/9/2023     9:56 AM 2/10/2021    10:25 AM 2/13/2018    12:33 PM   PAP / HPV   PAP  Negative for Intraepithelial Lesion or Malignancy (NILM)   Negative for squamous intraepithelial lesion or malignancy  Electronically signed by Brandie Kennedy CT (ASCP) on 2/15/2018 at 11:29 AM      PAP (Historical)   NIL     HPV 16 DNA Negative Negative      HPV 18 DNA Negative Negative      Other HR HPV Negative Negative              12/26/2024   Contraception/Family Planning   Questions about contraception or family planning No        Reviewed and updated as needed this visit by Provider                             Objective    Exam  /84 (BP Location: Right arm, Cuff Size: Adult Regular)   Pulse 82   Temp 98.1  F (36.7  C) (Tympanic)   " Resp 14   Ht 1.581 m (5' 2.25\")   Wt 77.5 kg (170 lb 14.4 oz)   LMP 12/15/2024 (Approximate)   SpO2 99%   BMI 31.01 kg/m     Estimated body mass index is 31.01 kg/m  as calculated from the following:    Height as of this encounter: 1.581 m (5' 2.25\").    Weight as of this encounter: 77.5 kg (170 lb 14.4 oz).    Physical Exam  Vitals and nursing note reviewed.   Constitutional:       General: She is not in acute distress.     Appearance: Normal appearance. She is not ill-appearing.   HENT:      Head: Normocephalic and atraumatic.      Right Ear: Tympanic membrane, ear canal and external ear normal.      Left Ear: Tympanic membrane, ear canal and external ear normal.      Nose: Nose normal.      Mouth/Throat:      Mouth: Mucous membranes are moist.      Pharynx: Oropharynx is clear.   Eyes:      Extraocular Movements: Extraocular movements intact.      Conjunctiva/sclera: Conjunctivae normal.      Pupils: Pupils are equal, round, and reactive to light.   Cardiovascular:      Rate and Rhythm: Normal rate and regular rhythm.      Pulses: Normal pulses.      Heart sounds: Normal heart sounds.   Pulmonary:      Effort: Pulmonary effort is normal.      Breath sounds: Normal breath sounds.   Abdominal:      General: Bowel sounds are normal. There is no distension.      Palpations: Abdomen is soft. There is no mass.      Tenderness: There is no abdominal tenderness. There is no guarding.   Musculoskeletal:         General: Normal range of motion.      Cervical back: Normal range of motion.   Skin:     General: Skin is warm and dry.   Neurological:      General: No focal deficit present.      Mental Status: She is alert and oriented to person, place, and time. Mental status is at baseline.   Psychiatric:         Mood and Affect: Mood normal.         Behavior: Behavior normal.               Signed Electronically by: Tracy Mari CNP    "

## 2024-12-31 NOTE — PROGRESS NOTES
Writer received referral to Cancer Risk Management/Genetic Counseling.    Referred for:   Family history of breast cancer    Referred By    Provider Department Location Phone   Tracy Mari, CNP [facility] [phone]       Referral reviewed for appropriate plan, and sent to New Patient Scheduling ([phone]) for completion.    Shoshana Fuentes, RN, BSN  Oncology New Patient Nurse Navigator   [facility]

## 2025-06-10 ENCOUNTER — VIRTUAL VISIT (OUTPATIENT)
Dept: ONCOLOGY | Facility: CLINIC | Age: 35
End: 2025-06-10
Attending: NURSE PRACTITIONER
Payer: COMMERCIAL

## 2025-06-10 DIAGNOSIS — Z80.3 FAMILY HISTORY OF BREAST CANCER: Primary | ICD-10-CM

## 2025-06-10 PROCEDURE — 96041 GENETIC COUNSELING SVC EA 30: CPT | Mod: GT,95 | Performed by: GENETIC COUNSELOR, MS

## 2025-06-10 NOTE — PROGRESS NOTES
Virtual Visit Details    Type of service:  Video Visit   Video Start Time: 11:04am  Video End Time: 11:36am  Originating Location (pt. Location): Home  Distant Location (provider location):  Off-site  Platform used for Video Visit: Perla    6/10/2025    Referring Provider: Tracy Mari CNP    Present for Today's Visit: Aliya    Presenting Information:   I met with Aliya Yip today for genetic counseling (video visit) to discuss her family history of cancer.  She is here today to review this history, cancer screening recommendations, and available genetic testing options.    Personal History:  Aliya is a 34 year old female. She does not have any personal history of cancer.      She had her first menstrual period at age 13, her first child at age 30, and is premenopausal. Aliya has her ovaries, fallopian tubes and uterus in place.  She reports past history of oral contraceptive use from 2009-current and that she has never been on hormone replacement therapy.      She has not yet started mammogram or colonoscopy screening given her age. She does not regularly do any other cancer screening at this time.  Aliya reported no tobacco use, and 2-4 drinks per month for alcohol use.    Family History: Cancer family history and pertinent information reviewed below (Please see scanned pedigree for detailed family history information)  Mother with a history of breast cancer diagnosed at age 60 (Aliya does not believe it was triple negative). Treatment included lumpectomy, chemotherapy, and radiation.   Maternal aunt with a history of triple negative breast cancer diagnosed in her early 60's. She reportedly has had negative genetic testing in the last few years.   There is no known Ashkenazi [place] ancestry on either side of her family. There is no reported consanguinity.    Discussion:  Based upon her current personal and family history, Aliya is likely at low risk for a hereditary cancer syndrome.   We discussed  the features commonly associated with hereditary cancer syndromes, and a handout regarding this was provided to Aliya today. This can be found in the after visit summary.  Her aunt did have a triple negative breast cancers, which can be associated with germline mutations in the BRCA1/2 genes. Aliya shared that her aunt did have recent negative genetic testing. I explained that it is always helpful to review a copy of a relative's test report to confirm results, so if she is able to get a copy of her aunt's test report she is encouraged to pass it along to us in genetics for review.   As discussed in our session, her family history is absent for the following features typically seen in high risk families:   Cancers diagnosed at a young age (often before age 50)  Individuals with more than one primary cancer  Certain rare tumors/cancers  Multiple generations of the family affected with cancer  Because of the absence of these features, it is unlikely that there is a currently identifiable hereditary cancer syndrome present in Aliya's family.    We discussed that insurance coverage for genetic testing is dependent upon personal and family history being suggestive of a hereditary cancer syndrome.  We discussed the importance of Aliya contacting me if her personal or family history changes. This may modify our assessment regarding risk for a hereditary cancer syndrome and/or genetic testing options.   Screening recommendations based upon personal/family history:  Breast cancer screening is generally recommended to begin approximately 10 years younger than the earliest age of breast cancer diagnosis in the family, or at age 40, whichever comes first. In this family, screening may begin at age 40. Aliya is encouraged to reach back out to us in genetics after her first mammogram to undergo a formal risk assessment. She should also reach out if there are any updates to her personal or family history.     Plan:  1) No  genetic testing ordered today.   2) Information regarding recommended screening, based upon the family history, was reviewed for Aliya.  3) Aliya will contact me regularly and/or if the family or personal history changes. My contact information was provided.     I spent 40 minutes on the date of the encounter doing chart review, history and exam, documentation and further activities as noted above.    Ny Cox MS, [facility]  Licensed, Certified Genetic Counselor  [facility]  [email or web address]

## 2025-06-10 NOTE — NURSING NOTE
Current patient location: [address] [place] [id number]      Is the patient currently in the state of [place]? YES    Visit mode:VIDEO    If the visit is dropped, the patient can be reconnected by: VIDEO VISIT: Send to e-mail at: [email or web address]    Will anyone else be joining the visit? NO  (If patient encounters technical issues they should call [phone]    How would you like to obtain your AVS? MyChart    Are changes needed to the allergy or medication list? N/A    Reason for visit: Consult      Krista GALINDO

## 2025-06-10 NOTE — PATIENT INSTRUCTIONS
Assessing Cancer Risk  Cancer is a common diagnosis which impacts many families. Individuals may develop cancer due to environmental factors (such as exposures and lifestyle), aging, genetic predisposition, or a combination of these factors. The vast majority of cancer diagnoses are considered sporadic, and not primarily due to an inherited risk factor. Approximately 5-10% of cancer diagnoses are thought to be caused by inherited risk factors.       There are several features that are likely to be present in a family that has an inherited alteration in a cancer susceptibility gene. However, this may not be the case for all families that carry a cancer risk gene, such as those with small family size or limited history information.  Cancers diagnosed at a young age (often before age 50)  Individuals with more than one primary cancer  Certain rare tumors/cancers  Multiple generations of the family affected with cancer    Categories of Cancer        Cancers may be:  Sporadic: somatic (acquired) genetic errors, environmental exposures, and lifestyle contribute to cancer as we age.  Familial: clustering of cancer in a family due to a combination of multiple genetic and shared environmental factors.  Hereditary: inherited risk for cancer, typically in a single gene.      Cancer Screening and Management  Cancer risk, as well as screening and management recommendations, are based on a combination of factors. This includes both personal and family history factors. Population cancer screening options, such as those recommended by the American Cancer Society and the National Comprehensive Cancer Network (NCCN), are appropriate for many families at average risk for cancer. However, earlier and/or more frequent screening may be recommended based on personal factors (lifestyle, exposures, medications, screening results), family history of cancer, and sometimes genetic factors. These cancer risk management options should be  discussed in more detail with an individual's medical providers.    Resources  National Society of Genetic Counselors nsgc.org   American Cancer Society cancer.org     Please call us if you have any questions or concerns.   Cancer Risk Management Program (Appointments: [phone])

## 2025-06-10 NOTE — LETTER
6/10/2025      Aliya Yip  [address] [facility], [place] [id number]      Dear Colleague,    Thank you for referring your patient, Aliya Yip, to the [facility]. Please see a copy of my visit note below.    Virtual Visit Details    Type of service:  Video Visit   Video Start Time: 11:04am  Video End Time: 11:36am  Originating Location (pt. Location): Home  Distant Location (provider location):  Off-site  Platform used for Video Visit: Perla    6/10/2025    Referring Provider: Tracy Mari CNP    Present for Today's Visit: Aliya    Presenting Information:   I met with Aliya Yip today for genetic counseling (video visit) to discuss her family history of cancer.  She is here today to review this history, cancer screening recommendations, and available genetic testing options.    Personal History:  Aliya is a 34 year old female. She does not have any personal history of cancer.      She had her first menstrual period at age 13, her first child at age 30, and is premenopausal. Aliya has her ovaries, fallopian tubes and uterus in place.  She reports past history of oral contraceptive use from 2009-current and that she has never been on hormone replacement therapy.      She has not yet started mammogram or colonoscopy screening given her age. She does not regularly do any other cancer screening at this time.  Aliya reported no tobacco use, and 2-4 drinks per month for alcohol use.    Family History: Cancer family history and pertinent information reviewed below (Please see scanned pedigree for detailed family history information)  Mother with a history of breast cancer diagnosed at age 60 (Aliya does not believe it was triple negative). Treatment included lumpectomy, chemotherapy, and radiation.   Maternal aunt with a history of triple negative breast cancer diagnosed in her early 60's. She reportedly has had negative genetic testing in the last few years.   There is no  known Ashkenazi [place] ancestry on either side of her family. There is no reported consanguinity.    Discussion:  Based upon her current personal and family history, Aliya is likely at low risk for a hereditary cancer syndrome.   We discussed the features commonly associated with hereditary cancer syndromes, and a handout regarding this was provided to Aliya today. This can be found in the after visit summary.  Her aunt did have a triple negative breast cancers, which can be associated with germline mutations in the BRCA1/2 genes. Aliya shared that her aunt did have recent negative genetic testing. I explained that it is always helpful to review a copy of a relative's test report to confirm results, so if she is able to get a copy of her aunt's test report she is encouraged to pass it along to us in genetics for review.   As discussed in our session, her family history is absent for the following features typically seen in high risk families:   Cancers diagnosed at a young age (often before age 50)  Individuals with more than one primary cancer  Certain rare tumors/cancers  Multiple generations of the family affected with cancer  Because of the absence of these features, it is unlikely that there is a currently identifiable hereditary cancer syndrome present in Aliya's family.    We discussed that insurance coverage for genetic testing is dependent upon personal and family history being suggestive of a hereditary cancer syndrome.  We discussed the importance of Aliya contacting me if her personal or family history changes. This may modify our assessment regarding risk for a hereditary cancer syndrome and/or genetic testing options.   Screening recommendations based upon personal/family history:  Breast cancer screening is generally recommended to begin approximately 10 years younger than the earliest age of breast cancer diagnosis in the family, or at age 40, whichever comes first. In this family, screening may  begin at age 40. Aliya is encouraged to reach back out to us in genetics after her first mammogram to undergo a formal risk assessment. She should also reach out if there are any updates to her personal or family history.     Plan:  1) No genetic testing ordered today.   2) Information regarding recommended screening, based upon the family history, was reviewed for Aliya.  3) Aliya will contact me regularly and/or if the family or personal history changes. My contact information was provided.     I spent 40 minutes on the date of the encounter doing chart review, history and exam, documentation and further activities as noted above.    Ny Cox MS, [facility] [place]  Licensed, Certified Genetic Counselor  [facility]  [email or web address]        Again, thank you for allowing me to participate in the care of your patient.        Sincerely,        Ny Aguirre GC    Electronically signed

## (undated) DEVICE — SUCTION CANISTER MEDIVAC LINER 3000ML W/LID 65651-530

## (undated) DEVICE — LINEN TOWEL PACK X10 5473

## (undated) DEVICE — LINEN HALF SHEET 5512

## (undated) DEVICE — PAD CHUX UNDERPAD 30X36" P3036C

## (undated) DEVICE — LINEN BABY BLANKET 5434

## (undated) DEVICE — BAG CLEAR TRASH 1.3M 39X33" P4040C

## (undated) DEVICE — GLOVE PROTEXIS BLUE W/NEU-THERA 6.5  2D73EB65

## (undated) DEVICE — DRSG TELFA ISLAND 4X14" 7544

## (undated) DEVICE — BLADE CLIPPER SGL USE 9680

## (undated) DEVICE — PACK C-SECTION LF PL15OTA83B

## (undated) DEVICE — ESU GROUND PAD ADULT W/CORD E7507

## (undated) DEVICE — DRSG STERI STRIP 1/2X4" R1547

## (undated) DEVICE — TRANSFER DEVICE BLOOD NDL HOLDER 364880

## (undated) DEVICE — CAP BABY PINK/BLUE IC-2

## (undated) DEVICE — LINEN FULL SHEET 5511

## (undated) DEVICE — GLOVE PROTEXIS W/NEU-THERA 7.5  2D73TE75

## (undated) DEVICE — SU VICRYL 0 CT-1 36" J346H

## (undated) DEVICE — SU MONOCRYL 4-0 PS-2 27" UND Y426H

## (undated) DEVICE — CATH TRAY FOLEY 16FR SILICONE 907416

## (undated) DEVICE — SOLIDIFIER FLUID RED 1500ML LIQUID KIT SYS POWDER ISOB1500

## (undated) DEVICE — STOCKING SLEEVE VASOPRESS COMPRESSION CALF MED VP501M

## (undated) DEVICE — CATH TRAY FOLEY SURESTEP 16FR DRAIN BAG STATOCK A899916

## (undated) DEVICE — SOL NACL 0.9% IRRIG 1000ML BOTTLE 2F7124

## (undated) DEVICE — SOL WATER IRRIG 1000ML BOTTLE 2F7114

## (undated) DEVICE — ESU PENCIL SMOKE EVAC W/ROCKER SWITCH 0703-047-000

## (undated) DEVICE — LINEN DRAPE 54X72" 5467

## (undated) DEVICE — SU PLAIN 2-0 CT-3 27" N863H

## (undated) DEVICE — STOCKING SLEEVE VASOPRESS COMPRESSION CALF MED 18" VP501M

## (undated) DEVICE — PREP CHLORAPREP 26ML TINTED ORANGE  260815

## (undated) DEVICE — PREP POVIDONE IODINE SOLUTION 10% 4OZ

## (undated) DEVICE — GLOVE PROTEXIS POWDER FREE SMT 6.5  2D72PT65X

## (undated) DEVICE — PREP SKIN SCRUB TRAY 4461A

## (undated) RX ORDER — FENTANYL CITRATE 50 UG/ML
INJECTION, SOLUTION INTRAMUSCULAR; INTRAVENOUS
Status: DISPENSED
Start: 2022-06-03

## (undated) RX ORDER — ONDANSETRON 2 MG/ML
INJECTION INTRAMUSCULAR; INTRAVENOUS
Status: DISPENSED
Start: 2022-06-03

## (undated) RX ORDER — KETOROLAC TROMETHAMINE 30 MG/ML
INJECTION, SOLUTION INTRAMUSCULAR; INTRAVENOUS
Status: DISPENSED
Start: 2022-06-03

## (undated) RX ORDER — OXYTOCIN/0.9 % SODIUM CHLORIDE 30/500 ML
PLASTIC BAG, INJECTION (ML) INTRAVENOUS
Status: DISPENSED
Start: 2022-06-03

## (undated) RX ORDER — OXYTOCIN/0.9 % SODIUM CHLORIDE 30/500 ML
PLASTIC BAG, INJECTION (ML) INTRAVENOUS
Status: DISPENSED
Start: 2019-11-17

## (undated) RX ORDER — DEXAMETHASONE SODIUM PHOSPHATE 4 MG/ML
INJECTION, SOLUTION INTRA-ARTICULAR; INTRALESIONAL; INTRAMUSCULAR; INTRAVENOUS; SOFT TISSUE
Status: DISPENSED
Start: 2022-06-03

## (undated) RX ORDER — FENTANYL CITRATE-0.9 % NACL/PF 10 MCG/ML
PLASTIC BAG, INJECTION (ML) INTRAVENOUS
Status: DISPENSED
Start: 2022-06-03

## (undated) RX ORDER — MORPHINE SULFATE 1 MG/ML
INJECTION, SOLUTION EPIDURAL; INTRATHECAL; INTRAVENOUS
Status: DISPENSED
Start: 2022-06-03

## (undated) RX ORDER — MORPHINE SULFATE 1 MG/ML
INJECTION, SOLUTION EPIDURAL; INTRATHECAL; INTRAVENOUS
Status: DISPENSED
Start: 2019-11-17